# Patient Record
Sex: MALE | Race: WHITE | URBAN - METROPOLITAN AREA
[De-identification: names, ages, dates, MRNs, and addresses within clinical notes are randomized per-mention and may not be internally consistent; named-entity substitution may affect disease eponyms.]

---

## 2023-10-15 ENCOUNTER — HOSPITAL ENCOUNTER (INPATIENT)
Facility: HOSPITAL | Age: 43
LOS: 2 days | Discharge: HOME | DRG: 322 | End: 2023-10-17
Attending: EMERGENCY MEDICINE | Admitting: INTERNAL MEDICINE

## 2023-10-15 ENCOUNTER — APPOINTMENT (OUTPATIENT)
Dept: RADIOLOGY | Facility: HOSPITAL | Age: 43
DRG: 322 | End: 2023-10-15

## 2023-10-15 DIAGNOSIS — I21.19: ICD-10-CM

## 2023-10-15 DIAGNOSIS — I21.3 STEMI (ST ELEVATION MYOCARDIAL INFARCTION) (MULTI): Primary | ICD-10-CM

## 2023-10-15 DIAGNOSIS — I21.11 ST ELEVATION (STEMI) MYOCARDIAL INFARCTION INVOLVING RIGHT CORONARY ARTERY (MULTI): ICD-10-CM

## 2023-10-15 LAB
ALBUMIN SERPL BCP-MCNC: 4.1 G/DL (ref 3.4–5)
ALP SERPL-CCNC: 70 U/L (ref 33–120)
ALT SERPL W P-5'-P-CCNC: 18 U/L (ref 10–52)
ANION GAP SERPL CALC-SCNC: 14 MMOL/L (ref 10–20)
APTT PPP: 57 SECONDS (ref 27–38)
AST SERPL W P-5'-P-CCNC: 20 U/L (ref 9–39)
BASOPHILS # BLD AUTO: 0.06 X10*3/UL (ref 0–0.1)
BASOPHILS NFR BLD AUTO: 0.5 %
BILIRUB SERPL-MCNC: 0.4 MG/DL (ref 0–1.2)
BUN SERPL-MCNC: 12 MG/DL (ref 6–23)
CALCIUM SERPL-MCNC: 8.7 MG/DL (ref 8.6–10.3)
CARDIAC TROPONIN I PNL SERPL HS: 552 NG/L (ref 0–20)
CHLORIDE SERPL-SCNC: 100 MMOL/L (ref 98–107)
CO2 SERPL-SCNC: 25 MMOL/L (ref 21–32)
CREAT SERPL-MCNC: 0.92 MG/DL (ref 0.5–1.3)
EOSINOPHIL # BLD AUTO: 0.27 X10*3/UL (ref 0–0.7)
EOSINOPHIL NFR BLD AUTO: 2.2 %
ERYTHROCYTE [DISTWIDTH] IN BLOOD BY AUTOMATED COUNT: 12.2 % (ref 11.5–14.5)
GFR SERPL CREATININE-BSD FRML MDRD: >90 ML/MIN/1.73M*2
GLUCOSE SERPL-MCNC: 208 MG/DL (ref 74–99)
HCT VFR BLD AUTO: 44.8 % (ref 41–52)
HGB BLD-MCNC: 16.5 G/DL (ref 13.5–17.5)
IMM GRANULOCYTES # BLD AUTO: 0.06 X10*3/UL (ref 0–0.7)
IMM GRANULOCYTES NFR BLD AUTO: 0.5 % (ref 0–0.9)
INR PPP: 1.1 (ref 0.9–1.1)
LYMPHOCYTES # BLD AUTO: 3.77 X10*3/UL (ref 1.2–4.8)
LYMPHOCYTES NFR BLD AUTO: 30.8 %
MCH RBC QN AUTO: 31.5 PG (ref 26–34)
MCHC RBC AUTO-ENTMCNC: 36.8 G/DL (ref 32–36)
MCV RBC AUTO: 86 FL (ref 80–100)
MONOCYTES # BLD AUTO: 0.97 X10*3/UL (ref 0.1–1)
MONOCYTES NFR BLD AUTO: 7.9 %
NEUTROPHILS # BLD AUTO: 7.13 X10*3/UL (ref 1.2–7.7)
NEUTROPHILS NFR BLD AUTO: 58.1 %
NRBC BLD-RTO: 0 /100 WBCS (ref 0–0)
PLATELET # BLD AUTO: 237 X10*3/UL (ref 150–450)
PMV BLD AUTO: 10.2 FL (ref 7.5–11.5)
POTASSIUM SERPL-SCNC: 3.5 MMOL/L (ref 3.5–5.3)
PROT SERPL-MCNC: 6.7 G/DL (ref 6.4–8.2)
PROTHROMBIN TIME: 12.2 SECONDS (ref 9.8–12.8)
RBC # BLD AUTO: 5.24 X10*6/UL (ref 4.5–5.9)
SODIUM SERPL-SCNC: 135 MMOL/L (ref 136–145)
WBC # BLD AUTO: 12.3 X10*3/UL (ref 4.4–11.3)

## 2023-10-15 PROCEDURE — 99285 EMERGENCY DEPT VISIT HI MDM: CPT | Performed by: EMERGENCY MEDICINE

## 2023-10-15 PROCEDURE — 80053 COMPREHEN METABOLIC PANEL: CPT | Performed by: EMERGENCY MEDICINE

## 2023-10-15 PROCEDURE — 71045 X-RAY EXAM CHEST 1 VIEW: CPT | Performed by: STUDENT IN AN ORGANIZED HEALTH CARE EDUCATION/TRAINING PROGRAM

## 2023-10-15 PROCEDURE — 2550000001 HC RX 255 CONTRASTS

## 2023-10-15 PROCEDURE — 027034Z DILATION OF CORONARY ARTERY, ONE ARTERY WITH DRUG-ELUTING INTRALUMINAL DEVICE, PERCUTANEOUS APPROACH: ICD-10-PCS | Performed by: INTERNAL MEDICINE

## 2023-10-15 PROCEDURE — 1200000002 HC GENERAL ROOM WITH TELEMETRY DAILY

## 2023-10-15 PROCEDURE — 4A023N7 MEASUREMENT OF CARDIAC SAMPLING AND PRESSURE, LEFT HEART, PERCUTANEOUS APPROACH: ICD-10-PCS | Performed by: INTERNAL MEDICINE

## 2023-10-15 PROCEDURE — 99153 MOD SED SAME PHYS/QHP EA: CPT | Mod: AHUEP | Performed by: INTERNAL MEDICINE

## 2023-10-15 PROCEDURE — 96373 THER/PROPH/DIAG INJ IA: CPT

## 2023-10-15 PROCEDURE — 93458 L HRT ARTERY/VENTRICLE ANGIO: CPT | Performed by: INTERNAL MEDICINE

## 2023-10-15 PROCEDURE — 85347 COAGULATION TIME ACTIVATED: CPT

## 2023-10-15 PROCEDURE — C1887 CATHETER, GUIDING: HCPCS | Mod: AHUEP | Performed by: INTERNAL MEDICINE

## 2023-10-15 PROCEDURE — 84484 ASSAY OF TROPONIN QUANT: CPT | Performed by: EMERGENCY MEDICINE

## 2023-10-15 PROCEDURE — 92941 PRQ TRLML REVSC TOT OCCL AMI: CPT | Performed by: INTERNAL MEDICINE

## 2023-10-15 PROCEDURE — 99152 MOD SED SAME PHYS/QHP 5/>YRS: CPT | Mod: AHUEP | Performed by: INTERNAL MEDICINE

## 2023-10-15 PROCEDURE — 99153 MOD SED SAME PHYS/QHP EA: CPT | Performed by: INTERNAL MEDICINE

## 2023-10-15 PROCEDURE — 96372 THER/PROPH/DIAG INJ SC/IM: CPT

## 2023-10-15 PROCEDURE — 2500000004 HC RX 250 GENERAL PHARMACY W/ HCPCS (ALT 636 FOR OP/ED)

## 2023-10-15 PROCEDURE — 36415 COLL VENOUS BLD VENIPUNCTURE: CPT | Performed by: EMERGENCY MEDICINE

## 2023-10-15 PROCEDURE — 71045 X-RAY EXAM CHEST 1 VIEW: CPT | Mod: FY

## 2023-10-15 PROCEDURE — C1725 CATH, TRANSLUMIN NON-LASER: HCPCS | Mod: AHUEP | Performed by: INTERNAL MEDICINE

## 2023-10-15 PROCEDURE — B2111ZZ FLUOROSCOPY OF MULTIPLE CORONARY ARTERIES USING LOW OSMOLAR CONTRAST: ICD-10-PCS | Performed by: INTERNAL MEDICINE

## 2023-10-15 PROCEDURE — 85730 THROMBOPLASTIN TIME PARTIAL: CPT | Performed by: EMERGENCY MEDICINE

## 2023-10-15 PROCEDURE — C1769 GUIDE WIRE: HCPCS | Mod: AHUEP | Performed by: INTERNAL MEDICINE

## 2023-10-15 PROCEDURE — 99152 MOD SED SAME PHYS/QHP 5/>YRS: CPT | Performed by: INTERNAL MEDICINE

## 2023-10-15 PROCEDURE — 2500000005 HC RX 250 GENERAL PHARMACY W/O HCPCS

## 2023-10-15 PROCEDURE — 84075 ASSAY ALKALINE PHOSPHATASE: CPT | Performed by: EMERGENCY MEDICINE

## 2023-10-15 PROCEDURE — C1894 INTRO/SHEATH, NON-LASER: HCPCS | Mod: AHUEP | Performed by: INTERNAL MEDICINE

## 2023-10-15 PROCEDURE — 85610 PROTHROMBIN TIME: CPT | Performed by: EMERGENCY MEDICINE

## 2023-10-15 PROCEDURE — C1874 STENT, COATED/COV W/DEL SYS: HCPCS | Mod: AHUEP | Performed by: INTERNAL MEDICINE

## 2023-10-15 PROCEDURE — 92941 PRQ TRLML REVSC TOT OCCL AMI: CPT | Mod: AHUEP | Performed by: INTERNAL MEDICINE

## 2023-10-15 PROCEDURE — 2500000001 HC RX 250 WO HCPCS SELF ADMINISTERED DRUGS (ALT 637 FOR MEDICARE OP): Performed by: INTERNAL MEDICINE

## 2023-10-15 PROCEDURE — 85025 COMPLETE CBC W/AUTO DIFF WBC: CPT | Performed by: EMERGENCY MEDICINE

## 2023-10-15 PROCEDURE — 2780000003 HC OR 278 NO HCPCS: Mod: AHUEP | Performed by: INTERNAL MEDICINE

## 2023-10-15 PROCEDURE — 2720000007 HC OR 272 NO HCPCS: Mod: AHUEP | Performed by: INTERNAL MEDICINE

## 2023-10-15 PROCEDURE — 2500000004 HC RX 250 GENERAL PHARMACY W/ HCPCS (ALT 636 FOR OP/ED): Performed by: EMERGENCY MEDICINE

## 2023-10-15 DEVICE — STENT ONYXNG30022UX ONYX 3.00X22RX
Type: IMPLANTABLE DEVICE | Status: FUNCTIONAL
Brand: ONYX FRONTIER™

## 2023-10-15 RX ORDER — NAPROXEN SODIUM 220 MG/1
81 TABLET, FILM COATED ORAL DAILY
Status: DISCONTINUED | OUTPATIENT
Start: 2023-10-15 | End: 2023-10-17 | Stop reason: HOSPADM

## 2023-10-15 RX ORDER — LISINOPRIL 5 MG/1
5 TABLET ORAL DAILY
Status: DISCONTINUED | OUTPATIENT
Start: 2023-10-15 | End: 2023-10-17 | Stop reason: HOSPADM

## 2023-10-15 RX ORDER — SODIUM CHLORIDE 9 MG/ML
INJECTION, SOLUTION INTRAVENOUS CONTINUOUS PRN
Status: COMPLETED | OUTPATIENT
Start: 2023-10-15 | End: 2023-10-15

## 2023-10-15 RX ORDER — HEPARIN SODIUM 5000 [USP'U]/ML
INJECTION, SOLUTION INTRAVENOUS; SUBCUTANEOUS CODE/TRAUMA/SEDATION MEDICATION
Status: COMPLETED | OUTPATIENT
Start: 2023-10-15 | End: 2023-10-15

## 2023-10-15 RX ORDER — HEPARIN SODIUM 1000 [USP'U]/ML
INJECTION, SOLUTION INTRAVENOUS; SUBCUTANEOUS AS NEEDED
Status: DISCONTINUED | OUTPATIENT
Start: 2023-10-15 | End: 2023-10-15 | Stop reason: HOSPADM

## 2023-10-15 RX ORDER — ATORVASTATIN CALCIUM 80 MG/1
80 TABLET, FILM COATED ORAL NIGHTLY
Status: DISCONTINUED | OUTPATIENT
Start: 2023-10-15 | End: 2023-10-17 | Stop reason: HOSPADM

## 2023-10-15 RX ORDER — FENTANYL CITRATE 50 UG/ML
INJECTION, SOLUTION INTRAMUSCULAR; INTRAVENOUS AS NEEDED
Status: DISCONTINUED | OUTPATIENT
Start: 2023-10-15 | End: 2023-10-15 | Stop reason: HOSPADM

## 2023-10-15 RX ORDER — NITROGLYCERIN 0.4 MG/1
0.4 TABLET SUBLINGUAL EVERY 5 MIN PRN
Status: DISCONTINUED | OUTPATIENT
Start: 2023-10-15 | End: 2023-10-17 | Stop reason: HOSPADM

## 2023-10-15 RX ORDER — HEPARIN SODIUM 1000 [USP'U]/ML
4000 INJECTION, SOLUTION INTRAVENOUS; SUBCUTANEOUS ONCE
Status: DISCONTINUED | OUTPATIENT
Start: 2023-10-15 | End: 2023-10-15

## 2023-10-15 RX ORDER — LIDOCAINE HYDROCHLORIDE 20 MG/ML
INJECTION, SOLUTION INFILTRATION; PERINEURAL AS NEEDED
Status: DISCONTINUED | OUTPATIENT
Start: 2023-10-15 | End: 2023-10-15 | Stop reason: HOSPADM

## 2023-10-15 RX ORDER — NITROGLYCERIN 40 MG/100ML
INJECTION INTRAVENOUS CONTINUOUS PRN
Status: COMPLETED | OUTPATIENT
Start: 2023-10-15 | End: 2023-10-15

## 2023-10-15 RX ORDER — MIDAZOLAM HYDROCHLORIDE 1 MG/ML
INJECTION INTRAMUSCULAR; INTRAVENOUS AS NEEDED
Status: DISCONTINUED | OUTPATIENT
Start: 2023-10-15 | End: 2023-10-15 | Stop reason: HOSPADM

## 2023-10-15 RX ADMIN — TICAGRELOR 180 MG: 90 TABLET ORAL at 18:46

## 2023-10-15 RX ADMIN — LISINOPRIL 5 MG: 5 TABLET ORAL at 20:45

## 2023-10-15 RX ADMIN — HEPARIN SODIUM 4000 UNITS: 5000 INJECTION, SOLUTION INTRAVENOUS; SUBCUTANEOUS at 18:47

## 2023-10-15 RX ADMIN — ATORVASTATIN CALCIUM 80 MG: 80 TABLET ORAL at 21:00

## 2023-10-15 RX ADMIN — ASPIRIN 81 MG CHEWABLE TABLET 81 MG: 81 TABLET CHEWABLE at 20:45

## 2023-10-15 ASSESSMENT — ACTIVITIES OF DAILY LIVING (ADL)
JUDGMENT_ADEQUATE_SAFELY_COMPLETE_DAILY_ACTIVITIES: YES
PATIENT'S MEMORY ADEQUATE TO SAFELY COMPLETE DAILY ACTIVITIES?: YES
FEEDING YOURSELF: INDEPENDENT
HEARING - RIGHT EAR: FUNCTIONAL
BATHING: INDEPENDENT
GROOMING: INDEPENDENT
ADEQUATE_TO_COMPLETE_ADL: YES
TOILETING: INDEPENDENT
HEARING - LEFT EAR: FUNCTIONAL
WALKS IN HOME: INDEPENDENT
DRESSING YOURSELF: INDEPENDENT

## 2023-10-15 ASSESSMENT — COLUMBIA-SUICIDE SEVERITY RATING SCALE - C-SSRS
1. IN THE PAST MONTH, HAVE YOU WISHED YOU WERE DEAD OR WISHED YOU COULD GO TO SLEEP AND NOT WAKE UP?: NO
2. HAVE YOU ACTUALLY HAD ANY THOUGHTS OF KILLING YOURSELF?: NO
6. HAVE YOU EVER DONE ANYTHING, STARTED TO DO ANYTHING, OR PREPARED TO DO ANYTHING TO END YOUR LIFE?: NO

## 2023-10-15 ASSESSMENT — PAIN - FUNCTIONAL ASSESSMENT
PAIN_FUNCTIONAL_ASSESSMENT: 0-10
PAIN_FUNCTIONAL_ASSESSMENT: 0-10

## 2023-10-15 ASSESSMENT — PAIN DESCRIPTION - DESCRIPTORS: DESCRIPTORS: ACHING

## 2023-10-15 ASSESSMENT — PAIN SCALES - GENERAL
PAINLEVEL_OUTOF10: 1
PAINLEVEL_OUTOF10: 1
PAINLEVEL_OUTOF10: 2

## 2023-10-15 NOTE — ED PROVIDER NOTES
HPI   No chief complaint on file.      HPI: 43-year-old male arrives with chest pain across his anterior chest that started while driving.  EMS sent a twelve-lead to our emergency department and apparently spoke to Dr. Orozco they had told him they given aspirin but he asked him to hold on Brilinta and heparin.  The patient was having no pain radiating to the back and this was an obvious inferior wall MI with reciprocal changes in 1 and aVL ST segment elevations in 2 3 and aVF.  I spoke directly to the interventional list who came in with the team to take him to the Cath Lab.  In route to the hospital the interventionalists requested that we give him Brilinta and heparin immediately upon arrival which we did we also placed defibrillatory patches on him.  The squad also told us they gave nitro with this inferior wall event fortunately the patient did not suffer from any hypotension.  His pain seems to improve but his EKG is still demonstrating an acute inferior wall MI.      PMH: Patient has had blood clots as recently as 5 years ago but is not on any blood thinners.    SH live tobacco of alcohol  FH of heart disease and diabetes reports that almost everyone in his family has had a heart attack  ROS  General Appears in distress  HEENT: No sore throat, No Visual Loss, No Headache, No Ear Pain  Neck: Denies neck pain  Chest positive chest pain, no pleuritic pain, no chest wall injury  Pulmonary: No SOB, No Cough, No Sputum production, No Wheezing  GI: No abdominal pain, no nausea or vomiting, no diarrhea.  : No dysuria, no frequency, no hematuria.  Extremities: No musculoskeletal pain, normal ambulation, no paresthesia.  Psych: Normal interaction, no anxiety, no depression, no suicidal ideation  Skin: No rashes    ROS is otherwise negative    PE: General: Appears in distress        HEENT: Throat is moist without exudate, midline uvula dentate intact, Tms clear with normal anatomy.        Neck: Supple non tender         Chest CTA, good AE, no wheezing, rales, or rhonci        CVA: RRR S1S2 no S3S4 or murmur        ABD: W/S/NT no HSM, no pulsatile masses, good bowel sounds        Extremities: Excellent distal pulses, brisk capillary refill. Full ROM        Psych: Normal interactions with no signs of depression  or suicidal ideation.        Neuro: Alert and oriented, moves all and feels all.    MDM: Patient with acute inferior wall MI without any back pain patient had relief with nitro by EMS EMS also gave aspirin.  Patient has been prepped for the Cath Lab cardiology is already present awaiting his team patient will be admitted to cardiology intensive care unit.      History provided by:  Patient and EMS personnel  History limited by:  Acuity of condition   used: No                        [place] Coma Scale Score: 15                  Patient History   No past medical history on file.  No past surgical history on file.  No family history on file.  Social History     Tobacco Use    Smoking status: Not on file    Smokeless tobacco: Not on file   Substance Use Topics    Alcohol use: Not on file    Drug use: Not on file       Physical Exam   ED Triage Vitals [10/15/23 1844]   Temp Heart Rate Resp BP   -- (!) 112 18 (!) 164/96      SpO2 Temp src Heart Rate Source Patient Position   99 % -- -- --      BP Location FiO2 (%)     -- --       Physical Exam    ED Course & MDM   Diagnoses as of 10/15/23 1851   Acute MI, inferior wall (CMS/[facility])       Medical Decision Making      Procedure  Critical Care    Performed by: Charlie Spaulding MD  Authorized by: Charlie Spaulding MD    Critical care provider statement:     Critical care time (minutes): STEMI.    Critical care start time:  10/15/2023 6:40 PM    Critical care end time:  10/15/2023 7:12 PM    Critical care time was exclusive of:  Separately billable procedures and treating other patients and teaching time    Critical care was necessary to treat or prevent imminent or  life-threatening deterioration of the following conditions:  Cardiac failure and circulatory failure    Critical care was time spent personally by me on the following activities:  Discussions with consultants, examination of patient, pulse oximetry, ordering and review of radiographic studies, ordering and review of laboratory studies, ordering and performing treatments and interventions and re-evaluation of patient's condition    I assumed direction of critical care for this patient from another provider in my specialty: yes      Care discussed with: admitting provider         Charlie Spaulding MD  10/15/23 3658

## 2023-10-15 NOTE — POST-PROCEDURE NOTE
Physician Transition of Care Summary  Invasive Cardiovascular Lab    Procedure Date: 10/15/2023  Attending:    * Invasive Cardiologist Jeanne - Primary     * Juan Manuel Hall  Resident/Fellow/Other Assistant: C. Barton Gillombardo, MD    Pre Procedure Indications:   ACS less than or equal to 24 hours STEMI     Post Procedure Diagnosis:   RCA culprit  Moderate non-obstructive CAD otherwise    Procedure(s):   Left Heart Catheterization and Percutaneous Coronary Intervention    Procedure Findings:   See syngo for details, briefly:  LM: distal 40  LAD: prox-mid 50   RI: prox 40  LCX: prox 50  RCA: 100% mid, culprit    Description of the Procedure:   Successful, uncomplicated and well-tolerated by the patient    Complications:   None    Stents/Implants:   3.0 by 20 mm drug-eluting stent    Anticoagulation/Antiplatelet Plan:   Aspirin and Brilinta    Estimated Blood Loss:   10 mL    Anesthesia: Moderate Sedation Anesthesia Staff: No anesthesia staff entered.    Any Specimen(s) Removed:   No specimens collected during this procedure.    Disposition:   Admit to ICU for close monitoring, signout has been provided to Dr. Geo Vazquez the attending cardiologist on-call this evening.      Electronically signed by: Carl B Gillombardo, MD, 10/15/2023 7:54 PM

## 2023-10-15 NOTE — Clinical Note
Patient Clipped and Prepped: groin and right radial. Prepped with ChloraPrep, a minimum of 3 minute dry time, longer if needed, no pooling noted, patient draped in sterile fashion.

## 2023-10-15 NOTE — Clinical Note
Catheters and wires removed Detail Level: Detailed Add 70026 Cpt? (Important Note: In 2017 The Use Of 39308 Is Being Tracked By Cms To Determine Future Global Period Reimbursement For Global Periods): yes

## 2023-10-16 ENCOUNTER — APPOINTMENT (OUTPATIENT)
Dept: CARDIOLOGY | Facility: HOSPITAL | Age: 43
DRG: 322 | End: 2023-10-16

## 2023-10-16 LAB
ACT BLD: 242 SEC (ref 89–169)
ALBUMIN SERPL BCP-MCNC: 3.9 G/DL (ref 3.4–5)
ANION GAP SERPL CALC-SCNC: 10 MMOL/L (ref 10–20)
BNP SERPL-MCNC: 87 PG/ML (ref 0–99)
BODY SURFACE AREA: 2.44 M2
BUN SERPL-MCNC: 9 MG/DL (ref 6–23)
CALCIUM SERPL-MCNC: 8.6 MG/DL (ref 8.6–10.3)
CARDIAC TROPONIN I PNL SERPL HS: ABNORMAL NG/L (ref 0–20)
CHLORIDE SERPL-SCNC: 101 MMOL/L (ref 98–107)
CHOLEST SERPL-MCNC: 135 MG/DL (ref 0–199)
CHOLESTEROL/HDL RATIO: 5.9
CO2 SERPL-SCNC: 27 MMOL/L (ref 21–32)
CREAT SERPL-MCNC: 0.94 MG/DL (ref 0.5–1.3)
EJECTION FRACTION APICAL 4 CHAMBER: 66.4
ERYTHROCYTE [DISTWIDTH] IN BLOOD BY AUTOMATED COUNT: 12.2 % (ref 11.5–14.5)
EST. AVERAGE GLUCOSE BLD GHB EST-MCNC: 169 MG/DL
GFR SERPL CREATININE-BSD FRML MDRD: >90 ML/MIN/1.73M*2
GLUCOSE SERPL-MCNC: 176 MG/DL (ref 74–99)
HBA1C MFR BLD: 7.5 %
HCT VFR BLD AUTO: 42.4 % (ref 41–52)
HDLC SERPL-MCNC: 22.8 MG/DL
HGB BLD-MCNC: 15.3 G/DL (ref 13.5–17.5)
LDLC SERPL CALC-MCNC: 40 MG/DL
MCH RBC QN AUTO: 31.4 PG (ref 26–34)
MCHC RBC AUTO-ENTMCNC: 36.1 G/DL (ref 32–36)
MCV RBC AUTO: 87 FL (ref 80–100)
NON HDL CHOLESTEROL: 112 MG/DL (ref 0–149)
NRBC BLD-RTO: 0 /100 WBCS (ref 0–0)
PHOSPHATE SERPL-MCNC: 3 MG/DL (ref 2.5–4.9)
PLATELET # BLD AUTO: 225 X10*3/UL (ref 150–450)
PMV BLD AUTO: 9.9 FL (ref 7.5–11.5)
POTASSIUM SERPL-SCNC: 3.8 MMOL/L (ref 3.5–5.3)
RBC # BLD AUTO: 4.87 X10*6/UL (ref 4.5–5.9)
SODIUM SERPL-SCNC: 134 MMOL/L (ref 136–145)
TRIGL SERPL-MCNC: 362 MG/DL (ref 0–149)
VLDL: 72 MG/DL (ref 0–40)
WBC # BLD AUTO: 17.7 X10*3/UL (ref 4.4–11.3)

## 2023-10-16 PROCEDURE — 83036 HEMOGLOBIN GLYCOSYLATED A1C: CPT | Mod: AHULAB,CMCLAB | Performed by: INTERNAL MEDICINE

## 2023-10-16 PROCEDURE — 93306 TTE W/DOPPLER COMPLETE: CPT

## 2023-10-16 PROCEDURE — 1200000002 HC GENERAL ROOM WITH TELEMETRY DAILY

## 2023-10-16 PROCEDURE — 2500000001 HC RX 250 WO HCPCS SELF ADMINISTERED DRUGS (ALT 637 FOR MEDICARE OP): Performed by: STUDENT IN AN ORGANIZED HEALTH CARE EDUCATION/TRAINING PROGRAM

## 2023-10-16 PROCEDURE — 2500000004 HC RX 250 GENERAL PHARMACY W/ HCPCS (ALT 636 FOR OP/ED): Performed by: INTERNAL MEDICINE

## 2023-10-16 PROCEDURE — 2500000004 HC RX 250 GENERAL PHARMACY W/ HCPCS (ALT 636 FOR OP/ED): Performed by: STUDENT IN AN ORGANIZED HEALTH CARE EDUCATION/TRAINING PROGRAM

## 2023-10-16 PROCEDURE — 2500000001 HC RX 250 WO HCPCS SELF ADMINISTERED DRUGS (ALT 637 FOR MEDICARE OP): Performed by: INTERNAL MEDICINE

## 2023-10-16 PROCEDURE — 80069 RENAL FUNCTION PANEL: CPT | Performed by: INTERNAL MEDICINE

## 2023-10-16 PROCEDURE — 83880 ASSAY OF NATRIURETIC PEPTIDE: CPT | Performed by: INTERNAL MEDICINE

## 2023-10-16 PROCEDURE — 36415 COLL VENOUS BLD VENIPUNCTURE: CPT | Performed by: INTERNAL MEDICINE

## 2023-10-16 PROCEDURE — 99223 1ST HOSP IP/OBS HIGH 75: CPT | Performed by: INTERNAL MEDICINE

## 2023-10-16 PROCEDURE — 80061 LIPID PANEL: CPT | Performed by: INTERNAL MEDICINE

## 2023-10-16 PROCEDURE — 84484 ASSAY OF TROPONIN QUANT: CPT | Performed by: INTERNAL MEDICINE

## 2023-10-16 PROCEDURE — 85027 COMPLETE CBC AUTOMATED: CPT | Performed by: INTERNAL MEDICINE

## 2023-10-16 PROCEDURE — 93306 TTE W/DOPPLER COMPLETE: CPT | Performed by: INTERNAL MEDICINE

## 2023-10-16 RX ORDER — MORPHINE SULFATE 2 MG/ML
2 INJECTION, SOLUTION INTRAMUSCULAR; INTRAVENOUS EVERY 4 HOURS PRN
Status: DISCONTINUED | OUTPATIENT
Start: 2023-10-16 | End: 2023-10-17 | Stop reason: HOSPADM

## 2023-10-16 RX ORDER — METOPROLOL TARTRATE 25 MG/1
25 TABLET, FILM COATED ORAL 2 TIMES DAILY
Status: DISCONTINUED | OUTPATIENT
Start: 2023-10-16 | End: 2023-10-17 | Stop reason: HOSPADM

## 2023-10-16 RX ORDER — MORPHINE SULFATE 4 MG/ML
4 INJECTION, SOLUTION INTRAMUSCULAR; INTRAVENOUS EVERY 4 HOURS PRN
Status: DISCONTINUED | OUTPATIENT
Start: 2023-10-16 | End: 2023-10-17 | Stop reason: HOSPADM

## 2023-10-16 RX ADMIN — TICAGRELOR 90 MG: 90 TABLET ORAL at 21:00

## 2023-10-16 RX ADMIN — METOPROLOL TARTRATE 25 MG: 25 TABLET, FILM COATED ORAL at 20:46

## 2023-10-16 RX ADMIN — ASPIRIN 81 MG CHEWABLE TABLET 81 MG: 81 TABLET CHEWABLE at 09:39

## 2023-10-16 RX ADMIN — LISINOPRIL 5 MG: 5 TABLET ORAL at 09:39

## 2023-10-16 RX ADMIN — NITROGLYCERIN 0.4 MG: 0.4 TABLET SUBLINGUAL at 00:49

## 2023-10-16 RX ADMIN — NITROGLYCERIN 0.4 MG: 0.4 TABLET SUBLINGUAL at 00:54

## 2023-10-16 RX ADMIN — MORPHINE SULFATE 4 MG: 4 INJECTION, SOLUTION INTRAMUSCULAR; INTRAVENOUS at 01:21

## 2023-10-16 RX ADMIN — METOPROLOL TARTRATE 25 MG: 25 TABLET, FILM COATED ORAL at 09:39

## 2023-10-16 RX ADMIN — TICAGRELOR 90 MG: 90 TABLET ORAL at 09:39

## 2023-10-16 RX ADMIN — ATORVASTATIN CALCIUM 80 MG: 80 TABLET ORAL at 20:46

## 2023-10-16 RX ADMIN — PERFLUTREN 4 ML OF DILUTION: 6.52 INJECTION, SUSPENSION INTRAVENOUS at 13:47

## 2023-10-16 ASSESSMENT — PAIN SCALES - GENERAL
PAINLEVEL_OUTOF10: 0 - NO PAIN
PAINLEVEL_OUTOF10: 7
PAINLEVEL_OUTOF10: 3
PAINLEVEL_OUTOF10: 0 - NO PAIN
PAINLEVEL_OUTOF10: 0 - NO PAIN
PAINLEVEL_OUTOF10: 2
PAINLEVEL_OUTOF10: 0 - NO PAIN

## 2023-10-16 ASSESSMENT — ACTIVITIES OF DAILY LIVING (ADL): LACK_OF_TRANSPORTATION: NO

## 2023-10-16 ASSESSMENT — PAIN - FUNCTIONAL ASSESSMENT
PAIN_FUNCTIONAL_ASSESSMENT: 0-10

## 2023-10-16 ASSESSMENT — PAIN DESCRIPTION - DESCRIPTORS: DESCRIPTORS: SORE

## 2023-10-16 NOTE — CARE PLAN
The patient's goals for the shift include feel better    The clinical goals for the shift include pt will maintain adequate perfusion in R hand/wrist throughout this shift      Problem: Pain  Goal: Takes deep breaths with improved pain control throughout the shift  Outcome: Progressing  Goal: Turns in bed with improved pain control throughout the shift  Outcome: Progressing  Goal: Performs ADL's with improved pain control throughout shift  Outcome: Progressing     Problem: Pain  Goal: Free from opioid side effects throughout the shift  Outcome: Met  Goal: Free from acute confusion related to pain meds throughout the shift  Outcome: Met

## 2023-10-16 NOTE — H&P
History Of Present Illness:    Francisco May is a 43 y.o. male presenting with PMH of DVT/PE in 2019 (previously on warfarin) who presented overnight with chest pain, found to have inferior STEMI. He reports he has had progressive chest pain over the last few days, initially he thought related to indigestion, but continued to get worse. Sunday night, he experienced central, non-radiating chest pain while driving, was evaluated by EMS and aspirin loaded after speaking with  physician prior to arrival. Interventional cardiology was contacted, advising ED providers to start heparin gtt and load with brilinta, he was taken to the cath lab where he was found to have 100% thrombotic occlusion of the RCA which was stented without issue.     On interview today, he reports he is doing much better and has no acute cardiopulmonary complaints. His radial access site without signs of bleeing, bruising, or hematoma. He reports he feels well enough to go home. Regarding his prior PE, he reports the discussion at that time was for provoked PE either due to prolonged immobilization (works as a ) or possibly from prior injury (reports injured knee which was never really worked up). He has no known family history of coagulopathy, but reports significant cardiovascular disease in his family stating essentially all of his family members have had heart attacks (see below). He reports he has been a smoker since about age 35 and smokes 1 ppd. He was not on any medications prior to this admission.     Labs/Imaging:  CBC: 17.7/15.3/225  INR: 1.1  RFP: Na 134, K 3.8, Cr 0.94 (0.92), otherwise wnl  LFTs: wnl  Hs-troponin: 552 > 34,345  BNP: 87  Lipid panel: total 135, LDL 40, VLDL 72, HDL 22.8,     CXR: No evidence of acute cardipulmonary process. Low lung volumes/poor inspiratory effort.     Cardiac Hx:  TTE pending    ECG 10/15/23: NSR, normal axis, normal intervals, ST elevations II, III, aVF, ST depression V2  ECG  10/16/23: NSR, normal axis, normal intervals, significant interval improvement in prior ST elevations/depressions    XA 10/15/23:   LM: distal 40  LAD: prox-mid 50   RI: prox 40  LCX: prox 50  RCA: 100% mid, culprit    PMH: DVT/PE in 2019 (6 mos warfarin after)    PSH: none    FHx:  Significant family history of T2DM and CVD, many family members with hx of MI  He reports father had 9 heart attacks and 7 strokes,  at 48 from heart attack  Mother with diabetes, had MI at 48    SHx: ~7 pack-year smoking history; denies drug or alcohol use; reports 60 lb weight loss in the last year just from reduced calorie intake    Allergies:   Amoxicillin - reports rash as child after receiving ear tubes    Medications: no home medications     Last Recorded Vitals:  Vitals:    10/16/23 0400 10/16/23 0500 10/16/23 0600 10/16/23 0805   BP: 115/64 110/64 98/56 113/69   BP Location: Left arm      Patient Position: Lying      Pulse: 83 78 72 84   Resp: (!) 32 23 23 22   Temp: 36.1 °C (97 °F)   36.2 °C (97.2 °F)   TempSrc: Temporal      SpO2: 98% 95% 96%    Weight:       Height:           Last Labs:  CBC - 10/16/2023:  3:59 AM  17.7 15.3 225    42.4      CMP - 10/16/2023:  3:59 AM  8.6 6.7 20 --- 0.4   3.0 3.9 18 70      PTT - 10/15/2023:  6:56 PM  1.1   12.2 57     Troponin I, High Sensitivity   Date/Time Value Ref Range Status   10/16/2023 03:59 AM 34,345 (HH) 0 - 20 ng/L Final     Comment:     Previous result verified on 10/15/2023 1938 on specimen/case [id number] [facility] Troponin I, High Sensitivity with value 552 ng/L.   10/15/2023 06:56  (HH) 0 - 20 ng/L Final     BNP   Date/Time Value Ref Range Status   10/16/2023 03:59 AM 87 0 - 99 pg/mL Final     LDL Calculated   Date/Time Value Ref Range Status   10/16/2023 05:34 AM 40 <=99 mg/dL Final     Comment:                                 Near   Borderline      AGE      Desirable  Optimal    High     High     Very High     0-19 Y     0 -  "109     ---    110-129   >/= 130     ----    20-24 Y     0 - 119     ---    120-159   >/= 160     ----      >24 Y     0 -  99   100-129  130-159   160-189     >/=190       VLDL   Date/Time Value Ref Range Status   10/16/2023 05:34 AM 72 (H) 0 - 40 mg/dL Final      Last I/O:  I/O last 3 completed shifts:  In: - (0 mL/kg)   Out: 960 (8.2 mL/kg) [Urine:950 (0.2 mL/kg/hr); Blood:10]  Weight: 117 kg     Past Cardiology Tests (Last 3 Years):  EKG:  No results found for this or any previous visit from the past 1095 days.    Echo:  No results found for this or any previous visit from the past 1095 days.    Ejection Fractions:  No results found for: \"EF\"  Cath:  No results found for this or any previous visit from the past 1095 days.    Stress Test:  No results found for this or any previous visit from the past 1095 days.    Cardiac Imaging:  No results found for this or any previous visit from the past 1095 days.    Past Medical History:  He has no past medical history on file.    Past Surgical History:  He has no past surgical history on file.      Social History:  He reports that he has been smoking cigarettes. He has a 12.00 pack-year smoking history. He does not have any smokeless tobacco history on file. He reports that he does not currently use alcohol. He reports that he does not use drugs.    Family History:  Family History   Problem Relation Name Age of Onset    Heart attack Mother      Heart attack Father          Allergies:  Amoxicillin    Inpatient Medications:  Scheduled medications   Medication Dose Route Frequency    aspirin  81 mg oral Daily    atorvastatin  80 mg oral Nightly    lisinopril  5 mg oral Daily    metoprolol tartrate  25 mg oral BID    ticagrelor  180 mg oral Once    ticagrelor  90 mg oral BID     PRN medications   Medication    morphine    morphine    nitroglycerin     Continuous Medications   Medication Dose Last Rate     Outpatient Medications:  No current outpatient medications    Physical " Exam:  Constitutional: NAD, pleasant, obese  HEENT: PERRLA, EOMI, no scleral icterus, MMM  CV: RRR, no m/r/g, no JVD  Pulm: CTAB, no increased WOB  GI: Soft, NT, ND, normoactive BS  Extremities: no LE edema  Skin: WWP  Neuro: A&Ox4, no FND  Psych: Mood and affect appropriate to situation     Assessment/Plan   Francisco May is a 43 y.o. male presenting with PMH of DVT/PE in 2019 (previously on warfarin)  who presented overnight with chest pain, found to have inferior STEMI overnight 10/15-10/16. Had revascularization to RCA, started on DAPT with aspirin/brilinta, statin, and beta blocker. Echo pending.     #CAD c/b RCA STEMI 10/15 s/p PCI x1  #DLD  -Several days of heartburn-like pain then chest pain while driving, found to have inferior STEMI, brought in by EMS, successful PCI to RCA; Hs-troponin: 552>34,345; BNP: 87  -Significant family history of CVD, including father who  from heart attack at 48  -XA 10/15/23: LM: distal 40%; LAD: prox-mid 50%; RI: prox 40%; LCx: prox 50%; RCA: 100% mid-culprit s/p PCI  -Lipid panel: total 135, LDL 40, VLDL 72, HDL 22.8,   -Lipoprotein A level ordered for AM  -HbA1c pending, oral diabetic regimen can be started as appropriate  -Loaded with aspirin/brilinta, continue aspirin 81 mg daily and brilinta 90 mg BID  -Continue atorvastatin 80 mg daily   -Start metoprolol tartrate 25 mg BID; transition to succinate before discharge  -Follow-up complete echo  -Discussed smoking cessation, patient is motivated to quit at this time    #Hx of DVT/PE  -Previously treated for DVT/PE in 2019, likely provoked as he works as a   -Was on coumadin in the past  -Refer to vascular medicine outpatient on discharge to consider coagulopathy workup, he reports significant cardiovascular history in his family, includes father with multiple strokes    F: PRN  E: K>4, Mg>2  N: cardiac diet  A: PIVs  GI ppx: none  DVT ppx: ambulatory    Code status: Full code    I spent 45 minutes in the  professional and overall care of this patient. Patient staffed with cardiology attending, Dr. Baker. Cardiology will continue to follow.     Shawn Campos MD

## 2023-10-16 NOTE — PROGRESS NOTES
Pharmacy Medication History Review    Francisco May is a 43 y.o. male admitted for Acute MI, inferior wall (CMS/[facility]). Pharmacy reviewed the patient's [id number] medications and allergies for accuracy.    The list below reflectives the updated PTA list. Please review each medication in order reconciliation for additional clarification and justification.  No medications prior to admission.        The list below reflectives the updated allergy list. Please review each documented allergy for additional clarification and justification.  Allergies  Reviewed by Ching Presley RN on 10/15/2023        Severity Reactions Comments    Amoxicillin Low Rash Per pt report; states last reaction was at 5 y.o            Below are additional concerns with the patient's PTA list.  Per patient no medications or OTC    Sylvia Camarena CPhT

## 2023-10-16 NOTE — PROGRESS NOTES
10/16/23 1700   Discharge Planning   Living Arrangements Spouse/significant other   Support Systems Spouse/significant other   Assistance Needed Independent,drives, works   Type of Residence Private residence   Number of Stairs to Enter Residence 1   Number of Stairs Within Residence 12   Do you have animals or pets at home? Yes   Type of Animals or Pets Fish   Who is requesting discharge planning? Other (Comment)   Home or Post Acute Services None   Patient expects to be discharged to: [facility]   Does the patient need discharge transport arranged? No   Financial Resource Strain   How hard is it for you to pay for the very basics like food, housing, medical care, and heating? Not hard   Housing Stability   In the last 12 months, was there a time when you were not able to pay the mortgage or rent on time? N   In the last 12 months, how many places have you lived? 1   In the last 12 months, was there a time when you did not have a steady place to sleep or slept in a shelter (including now)? N   Transportation Needs   In the past 12 months, has lack of transportation kept you from medical appointments or from getting medications? no   In the past 12 months, has lack of transportation kept you from meetings, work, or from getting things needed for daily living? No   Patient Choice   Provider Choice list and CMS website (https://medicare.gov/care-compare#search) for post-acute Quality and Resource Measure Data were provided and reviewed with: Patient   Patient / Family choosing to utilize agency / facility established prior to hospitalization No       Met with patient at bedside and explained my role as care coordinator. He stated he lives with his wife in the house. He is independent with his care. He drives and works. Denies use of any ambulatory devices. No oxygen in use at home, no HD. Patient stated doesn't have PCP. Offered to giver him list of providers, he stated will find his own. Pharmacy he would use is  [facility] in [place]. Patient had cardiac catheterization done and one stent placed. Patient denies any needs going home.

## 2023-10-17 ENCOUNTER — PHARMACY VISIT (OUTPATIENT)
Dept: PHARMACY | Facility: CLINIC | Age: 43
End: 2023-10-17
Payer: COMMERCIAL

## 2023-10-17 VITALS
BODY MASS INDEX: 41.45 KG/M2 | OXYGEN SATURATION: 97 % | HEIGHT: 66 IN | WEIGHT: 257.94 LBS | RESPIRATION RATE: 16 BRPM | DIASTOLIC BLOOD PRESSURE: 67 MMHG | TEMPERATURE: 98.3 F | HEART RATE: 81 BPM | SYSTOLIC BLOOD PRESSURE: 104 MMHG

## 2023-10-17 PROCEDURE — RXMED WILLOW AMBULATORY MEDICATION CHARGE

## 2023-10-17 PROCEDURE — 2500000004 HC RX 250 GENERAL PHARMACY W/ HCPCS (ALT 636 FOR OP/ED): Performed by: STUDENT IN AN ORGANIZED HEALTH CARE EDUCATION/TRAINING PROGRAM

## 2023-10-17 PROCEDURE — 2500000001 HC RX 250 WO HCPCS SELF ADMINISTERED DRUGS (ALT 637 FOR MEDICARE OP)

## 2023-10-17 PROCEDURE — 99239 HOSP IP/OBS DSCHRG MGMT >30: CPT

## 2023-10-17 PROCEDURE — 2500000001 HC RX 250 WO HCPCS SELF ADMINISTERED DRUGS (ALT 637 FOR MEDICARE OP): Performed by: INTERNAL MEDICINE

## 2023-10-17 PROCEDURE — 2500000001 HC RX 250 WO HCPCS SELF ADMINISTERED DRUGS (ALT 637 FOR MEDICARE OP): Performed by: STUDENT IN AN ORGANIZED HEALTH CARE EDUCATION/TRAINING PROGRAM

## 2023-10-17 RX ORDER — NAPROXEN SODIUM 220 MG/1
81 TABLET, FILM COATED ORAL DAILY
Qty: 30 TABLET | Refills: 1 | Status: SHIPPED | OUTPATIENT
Start: 2023-10-17 | End: 2023-11-14 | Stop reason: SDUPTHER

## 2023-10-17 RX ORDER — ATORVASTATIN CALCIUM 80 MG/1
80 TABLET, FILM COATED ORAL NIGHTLY
Qty: 30 TABLET | Refills: 1 | Status: SHIPPED | OUTPATIENT
Start: 2023-10-17 | End: 2023-11-14 | Stop reason: SDUPTHER

## 2023-10-17 RX ORDER — METOPROLOL TARTRATE 25 MG/1
25 TABLET, FILM COATED ORAL 2 TIMES DAILY
Qty: 30 TABLET | Refills: 1 | Status: SHIPPED | OUTPATIENT
Start: 2023-10-17 | End: 2023-11-14 | Stop reason: SDUPTHER

## 2023-10-17 RX ADMIN — TICAGRELOR 90 MG: 90 TABLET ORAL at 08:27

## 2023-10-17 RX ADMIN — LISINOPRIL 5 MG: 5 TABLET ORAL at 08:27

## 2023-10-17 RX ADMIN — ASPIRIN 81 MG CHEWABLE TABLET 81 MG: 81 TABLET CHEWABLE at 08:27

## 2023-10-17 RX ADMIN — METOPROLOL TARTRATE 25 MG: 25 TABLET, FILM COATED ORAL at 08:27

## 2023-10-17 ASSESSMENT — PAIN SCALES - GENERAL
PAINLEVEL_OUTOF10: 0 - NO PAIN

## 2023-10-17 ASSESSMENT — PAIN - FUNCTIONAL ASSESSMENT
PAIN_FUNCTIONAL_ASSESSMENT: 0-10

## 2023-10-17 NOTE — DISCHARGE SUMMARY
Subjective Data:  Evaluated patient today, he reports that he feels great, no symptoms of chest pain, dyspnea, nausea, vomiting, diaphoresis, dizziness or lightheadedness.      Overnight Events:    No events overnight       Objective Data:  Last Recorded Vitals:  Vitals:    10/16/23 2000 10/17/23 0000 10/17/23 0400 10/17/23 0825   BP: 113/74  97/54 110/75   BP Location: Left arm      Patient Position: Sitting   Lying   Pulse: 73 86 87 82   Resp: 20 20 16   Temp: 36.2 °C (97.2 °F) 36.2 °C (97.1 °F) 36.2 °C (97.1 °F) 36.7 °C (98 °F)   TempSrc: Temporal  Temporal Temporal   SpO2: 97% 97% 97% 97%   Weight:       Height:           Last Labs:  CBC - 10/16/2023:  3:59 AM  17.7 15.3 225    42.4      CMP - 10/16/2023:  3:59 AM  8.6 6.7 20 --- 0.4   3.0 3.9 18 70      PTT - 10/15/2023:  6:56 PM  1.1   12.2 57     TROPHS   Date/Time Value Ref Range Status   10/16/2023 03:59 AM 34,345 0 - 20 ng/L Final     Comment:     Previous result verified on 10/15/2023 1938 on specimen/case [id number] called with component [facility] for procedure Troponin I, High Sensitivity with value 552 ng/L.   10/15/2023 06:56  0 - 20 ng/L Final     BNP   Date/Time Value Ref Range Status   10/16/2023 03:59 AM 87 0 - 99 pg/mL Final     HGBA1C   Date/Time Value Ref Range Status   10/16/2023 03:59 AM 7.5 see below % Final     LDLCALC   Date/Time Value Ref Range Status   10/16/2023 05:34 AM 40 <=99 mg/dL Final     Comment:                                 Near   Borderline      AGE      Desirable  Optimal    High     High     Very High     0-19 Y     0 - 109     ---    110-129   >/= 130     ----    20-24 Y     0 - 119     ---    120-159   >/= 160     ----      >24 Y     0 -  99   100-129  130-159   160-189     >/=190       VLDL   Date/Time Value Ref Range Status   10/16/2023 05:34 AM 72 0 - 40 mg/dL Final      Last I/O:  I/O last 3 completed shifts:  In: 620 (5.3 mL/kg) [P.O.:620]  Out: 1260 (10.8 mL/kg) [Urine:1250 (0.3 mL/kg/hr);  Blood:10]  Weight: 117 kg     Cardiology Tests  Echo:  Transthoracic Echo (TTE) Complete 10/16/2023  CONCLUSIONS:   1. Left ventricular systolic function is low normal with a 50-55% estimated ejection fraction.   2. Basal and mid inferior wall is abnormal.   3. Poorly visualized anatomical structures due to suboptimal image quality.      Inpatient Medications:  Scheduled medications   Medication Dose Route Frequency    aspirin  81 mg oral Daily    atorvastatin  80 mg oral Nightly    lisinopril  5 mg oral Daily    metoprolol tartrate  25 mg oral BID    ticagrelor  180 mg oral Once    ticagrelor  90 mg oral BID     PRN medications   Medication    morphine    morphine    nitroglycerin     Continuous Medications   Medication Dose Last Rate       Physical Exam:  Alert and oriented x3, no apparent distress  Skin: Warm and dry, right wrist cath site intact, no bleeding, hematoma or ecchymosis. Radial pulse intact. No pain at site.  No JVD noted  Lungs clear, no supplemental oxygen requirements  Cardiac: Regular rate and rhythm, no cardiac murmurs noted  Abdomen soft non-tender  No lower extremity edema  Mood appropriate         Assessment/Plan   Francisco May is a 43 y.o. male presenting with PMH of DVT/PE in 2019 (previously on warfarin)  who presented with CP, found to be having an inferior STEMI 10/15-10/16/23. Had revascularization to RCA, started on DAPT with aspirin/brilinta, statin, and beta blocker. Chest pain free.    Patient had several days of heartburn-like pain then chest pain while driving, found to have inferior STEMI, brought in by EMS, successful PCI to RCA 10/15/23; Hs-troponin: 552>34,345; BNP: 87  -XA 10/15/23: LM: distal 40%; LAD: prox-mid 50%; RI: prox 40%; LCx: prox 50%; RCA: 100% mid-culprit s/p PCI  -Lipid panel: total 135, LDL 40, VLDL 72, HDL 22.8,   -Lipoprotein A level ordered  -HbA1c 7.5  -Loaded with aspirin/brilinta, continue aspirin 81 mg daily and brilinta 90 mg BID  -Continue  atorvastatin 80 mg daily   -Start metoprolol tartrate 25 mg BID;  - Hold Ace/ Arb d/t soft Bps  -Discussed smoking cessation, patient is motivated to quit at this time  #Hx of DVT/PE  -Previously treated for DVT/PE in 2019, likely provoked as he works as a   -Was on coumadin in the past  -Refer to vascular medicine outpatient on discharge to consider coagulopathy workup, he reports significant cardiovascular history in his family, includes father with multiple strokes    EKG today reveals normal sinus rhythm, patient continues to be chest pain-free.  Echocardiogram reveals EF 50 to 55%.  Patient will follow with cardiologist Dr. Tonio Baker post discharge.  Patient will be discharged in stable condition.     Spent greater than 60 min on discharge prep     Code status: Full code        Code Status:  No Order          JIMI Vaughn-CNP

## 2023-10-17 NOTE — ED NOTES
CHW was informed by  that patient needs paperwork filled out ,through [employer], to help with medical bills and insurance. Patient filled out paperwork and CHW sent back to [facility] for approval. Patient expressed appreciation     Joanne Jeffery [facility]  10/17/23 [id number]

## 2023-10-17 NOTE — PROGRESS NOTES
Francisco May is a 43 y.o. male on day 2 of admission presenting with Acute MI, inferior wall (CMS/HCC).      Patient is stable. He has active discharge orders. Patient denies any needs going home.

## 2023-10-17 NOTE — CARE PLAN
The patient's goals for the shift include feel better    The clinical goals for the shift include Pt will remain hemodynamically stable throughout this shift    Over the shift, the patient met goals and will be discharged home.

## 2023-10-17 NOTE — PROGRESS NOTES
Patient does not qualify for medicaid, will discuss alternative insurance options with patient.

## 2023-10-23 RX ORDER — OMEPRAZOLE 20 MG/1
1 CAPSULE, DELAYED RELEASE ORAL
COMMUNITY
Start: 2016-05-05

## 2023-10-25 ENCOUNTER — OFFICE VISIT (OUTPATIENT)
Dept: CARDIOLOGY | Facility: CLINIC | Age: 43
End: 2023-10-25

## 2023-10-25 VITALS
OXYGEN SATURATION: 95 % | WEIGHT: 257.6 LBS | HEART RATE: 83 BPM | SYSTOLIC BLOOD PRESSURE: 128 MMHG | DIASTOLIC BLOOD PRESSURE: 84 MMHG | BODY MASS INDEX: 41.58 KG/M2

## 2023-10-25 DIAGNOSIS — E11.9 DIABETES MELLITUS TYPE II, NON INSULIN DEPENDENT (MULTI): ICD-10-CM

## 2023-10-25 DIAGNOSIS — I21.19: Primary | ICD-10-CM

## 2023-10-25 PROCEDURE — 3074F SYST BP LT 130 MM HG: CPT | Performed by: INTERNAL MEDICINE

## 2023-10-25 PROCEDURE — 3048F LDL-C <100 MG/DL: CPT | Performed by: INTERNAL MEDICINE

## 2023-10-25 PROCEDURE — 3051F HG A1C>EQUAL 7.0%<8.0%: CPT | Performed by: INTERNAL MEDICINE

## 2023-10-25 PROCEDURE — 99214 OFFICE O/P EST MOD 30 MIN: CPT | Performed by: INTERNAL MEDICINE

## 2023-10-25 PROCEDURE — 3079F DIAST BP 80-89 MM HG: CPT | Performed by: INTERNAL MEDICINE

## 2023-10-25 NOTE — PATIENT INSTRUCTIONS
Continue current medications    Referral to cardiac rehab placed    Ok to go back to work next week on Weds    Congrats on quitting smoking!    3 month follow up with bloodwork done just before visit

## 2023-10-25 NOTE — PROGRESS NOTES
Chief Complaint:   Hospital follow up     History Of Present Illness:    Francisco May is a 43 y.o. male History of prior DVT/PE in 2019, smoker, premature family history of CAD, STEMI 10/2023 with EMILY x1 to RCA    Here for hospital follow-up after stemi  Presented with several days of chest pain that he initially thought was indigestion.  The night prior to admission significant worsening of symptoms while driving.  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  Has not returned to work waiting on us.    HgA1c came back 7.5%    Of note he says DVT/PE in 2019 felt to be provoked due to his occupation.  He regularly would not get out of [employer] and walk around.  He took Eliquis for about 6 months.    CARDIAC CATH 10/15/23:  1. Inferior STEMI, now s/p PCI to mid RCA with 3.0 x 22 BRICE FRONTIER EMILY.   2. Right dominant coronary circulation, mid RCA culprit.   3. Moderate non-obstructive CAD elsewhere including distal left main (40%), Prox-mid LAD (50%), ramus (50%).   4. Left Ventricular end-diastolic pressure = 38.    ECHO 10/15/23:  1. Left ventricular systolic function is low normal with a 50-55% estimated ejection fraction.   2. Basal and mid inferior wall is abnormal.       Last Recorded Vitals:  Vitals:    10/25/23 1142   BP: 128/84   BP Location: Left arm   Pulse: 83   SpO2: 95%   Weight: 117 kg (257 lb 9.6 oz)       Past Medical History:  See HPI    Past Surgical History:  He has a past surgical history that includes Cardiac catheterization (N/A, 10/15/2023) and Cardiac catheterization (N/A, 10/15/2023).      Social History:  He reports that he has been smoking cigarettes. He has a 12.00 pack-year smoking history. He does not have any smokeless tobacco history on file. He reports that he does not currently use alcohol. He reports that he does not use drugs.    Family History:  Family History   Problem Relation Name Age of Onset     Heart attack Mother      Heart attack Father          Allergies:  Amoxicillin    Outpatient Medications:  Current Outpatient Medications   Medication Instructions    aspirin 81 mg, oral, Daily    atorvastatin (LIPITOR) 80 mg, oral, Nightly    metoprolol tartrate (LOPRESSOR) 25 mg, oral, 2 times daily    omeprazole (PriLOSEC) 20 mg DR capsule 1 capsule, oral, Daily RT    ticagrelor (Brilinta) 90 mg tablet Take 1 tablet (90 mg) by mouth 2 times a day.       Physical Exam:  GENERAL: alert, cooperative, pleasant, in no acute distress  SKIN: warm, dry, no rash.  NECK: no JVD, no RANDALL  CARDIAC: Regular rate and rhythm with no rubs, murmurs, or gallops  CHEST: Normal respiratory efforts, lungs clear to auscultation bilaterally.  ABDOMEN: soft, nontender, nondistended  EXTREMITIES: no edema  NEURO: Alert and oriented x 3.  Grossly normal.  Moves all 4 extremities.       Last Labs:  CBC -  Lab Results   Component Value Date    WBC 17.7 (H) 10/16/2023    HGB 15.3 10/16/2023    HCT 42.4 10/16/2023    MCV 87 10/16/2023     10/16/2023       CMP -  Lab Results   Component Value Date    CALCIUM 8.6 10/16/2023    PHOS 3.0 10/16/2023    PROT 6.7 10/15/2023    ALBUMIN 3.9 10/16/2023    AST 20 10/15/2023    ALT 18 10/15/2023    ALKPHOS 70 10/15/2023    BILITOT 0.4 10/15/2023       LIPID PANEL -   Lab Results   Component Value Date    CHOL 135 10/16/2023    TRIG 362 (H) 10/16/2023    HDL 22.8 10/16/2023    CHHDL 5.9 10/16/2023    VLDL 72 (H) 10/16/2023    NHDL 112 10/16/2023       RENAL FUNCTION PANEL -   Lab Results   Component Value Date    GLUCOSE 176 (H) 10/16/2023     (L) 10/16/2023    K 3.8 10/16/2023     10/16/2023    CO2 27 10/16/2023    ANIONGAP 10 10/16/2023    BUN 9 10/16/2023    CREATININE 0.94 10/16/2023    CALCIUM 8.6 10/16/2023    PHOS 3.0 10/16/2023    ALBUMIN 3.9 10/16/2023        Lab Results   Component Value Date    BNP 87 10/16/2023    HGBA1C 7.5 (H) 10/16/2023         Assessment/Plan   43 y.o.  male History of prior DVT/PE in 2019, former smoker, premature family history of CAD, STEMI 10/2023 with EMIYL x1 to RCA    #CAD  -asa, statin, brillinta, metoprolol  -Residual moderate atherosclerotic disease in LAD and ramus.  - Goal LDL less than 70.  Repeat lipids in about 3 months  -Referred to cardiac rehab    #DM2 - new dx  -he wants to try diet and exercise  -repeat A1c in 3 months.   -Needs to establish with primary care provider    #tobacco use  -now quit    #h/o DVT/PE - felt to be provoked. Completed 6 months therapy    Follow up 3 months     Tonio Baker DO

## 2023-10-26 PROBLEM — E11.9 DIABETES MELLITUS TYPE II, NON INSULIN DEPENDENT (MULTI): Status: ACTIVE | Noted: 2023-10-26

## 2023-10-26 PROBLEM — I82.4Y9 DEEP VEIN THROMBOSIS (DVT) OF PROXIMAL LOWER EXTREMITY (MULTI): Status: ACTIVE | Noted: 2023-10-26

## 2023-10-26 PROBLEM — I21.11 STEMI INVOLVING RIGHT CORONARY ARTERY (MULTI): Status: ACTIVE | Noted: 2023-10-26

## 2023-11-07 ENCOUNTER — APPOINTMENT (OUTPATIENT)
Dept: CARDIOLOGY | Facility: CLINIC | Age: 43
End: 2023-11-07

## 2023-11-14 DIAGNOSIS — I21.3 STEMI (ST ELEVATION MYOCARDIAL INFARCTION) (MULTI): ICD-10-CM

## 2023-11-14 RX ORDER — ATORVASTATIN CALCIUM 80 MG/1
80 TABLET, FILM COATED ORAL NIGHTLY
Qty: 90 TABLET | Refills: 3 | Status: SHIPPED | OUTPATIENT
Start: 2023-11-14 | End: 2024-11-13

## 2023-11-14 RX ORDER — METOPROLOL TARTRATE 25 MG/1
25 TABLET, FILM COATED ORAL 2 TIMES DAILY
Qty: 180 TABLET | Refills: 3 | Status: SHIPPED | OUTPATIENT
Start: 2023-11-14 | End: 2024-11-13

## 2023-11-14 RX ORDER — NAPROXEN SODIUM 220 MG/1
81 TABLET, FILM COATED ORAL DAILY
Qty: 90 TABLET | Refills: 3 | Status: SHIPPED | OUTPATIENT
Start: 2023-11-14 | End: 2024-11-13

## 2023-11-14 NOTE — TELEPHONE ENCOUNTER
Patient left [facility] requesting refill of medications. Seen in office post STEMI. Scripts cued for 1 year supply and forwarded to Dr. Baker to send to local pharmacy.

## 2025-04-27 ENCOUNTER — APPOINTMENT (OUTPATIENT)
Dept: CARDIOLOGY | Facility: HOSPITAL | Age: 45
End: 2025-04-27

## 2025-04-27 ENCOUNTER — APPOINTMENT (OUTPATIENT)
Dept: RADIOLOGY | Facility: HOSPITAL | Age: 45
End: 2025-04-27

## 2025-04-27 ENCOUNTER — HOSPITAL ENCOUNTER (INPATIENT)
Facility: HOSPITAL | Age: 45
LOS: 3 days | Discharge: HOME | End: 2025-04-30
Attending: EMERGENCY MEDICINE | Admitting: HOSPITALIST

## 2025-04-27 DIAGNOSIS — I21.4 NSTEMI (NON-ST ELEVATED MYOCARDIAL INFARCTION) (MULTI): ICD-10-CM

## 2025-04-27 DIAGNOSIS — I21.11 STEMI INVOLVING RIGHT CORONARY ARTERY (MULTI): Primary | ICD-10-CM

## 2025-04-27 DIAGNOSIS — I21.3 STEMI (ST ELEVATION MYOCARDIAL INFARCTION) (MULTI): ICD-10-CM

## 2025-04-27 DIAGNOSIS — E11.9 DIABETES MELLITUS TYPE II, NON INSULIN DEPENDENT (MULTI): ICD-10-CM

## 2025-04-27 LAB
ALBUMIN SERPL BCP-MCNC: 4.2 G/DL (ref 3.4–5)
ALP SERPL-CCNC: 85 U/L (ref 33–120)
ALT SERPL W P-5'-P-CCNC: 23 U/L (ref 10–52)
ANION GAP SERPL CALC-SCNC: 11 MMOL/L (ref 10–20)
APPEARANCE UR: CLEAR
APTT PPP: 26 SECONDS (ref 26–36)
AST SERPL W P-5'-P-CCNC: 81 U/L (ref 9–39)
BASOPHILS # BLD AUTO: 0.05 X10*3/UL (ref 0–0.1)
BASOPHILS NFR BLD AUTO: 0.3 %
BILIRUB SERPL-MCNC: 1 MG/DL (ref 0–1.2)
BILIRUB UR STRIP.AUTO-MCNC: NEGATIVE MG/DL
BUN SERPL-MCNC: 9 MG/DL (ref 6–23)
CALCIUM SERPL-MCNC: 9.5 MG/DL (ref 8.6–10.3)
CARDIAC TROPONIN I PNL SERPL HS: ABNORMAL NG/L (ref 0–20)
CARDIAC TROPONIN I PNL SERPL HS: ABNORMAL NG/L (ref 0–20)
CHLORIDE SERPL-SCNC: 99 MMOL/L (ref 98–107)
CO2 SERPL-SCNC: 26 MMOL/L (ref 21–32)
COLOR UR: ABNORMAL
CREAT SERPL-MCNC: 0.82 MG/DL (ref 0.5–1.3)
EGFRCR SERPLBLD CKD-EPI 2021: >90 ML/MIN/1.73M*2
EOSINOPHIL # BLD AUTO: 0.11 X10*3/UL (ref 0–0.7)
EOSINOPHIL NFR BLD AUTO: 0.7 %
ERYTHROCYTE [DISTWIDTH] IN BLOOD BY AUTOMATED COUNT: 12.3 % (ref 11.5–14.5)
GLUCOSE SERPL-MCNC: 335 MG/DL (ref 74–99)
GLUCOSE UR STRIP.AUTO-MCNC: ABNORMAL MG/DL
HCT VFR BLD AUTO: 46.3 % (ref 41–52)
HGB BLD-MCNC: 16.6 G/DL (ref 13.5–17.5)
IMM GRANULOCYTES # BLD AUTO: 0.06 X10*3/UL (ref 0–0.7)
IMM GRANULOCYTES NFR BLD AUTO: 0.4 % (ref 0–0.9)
INR PPP: 1.1 (ref 0.9–1.1)
KETONES UR STRIP.AUTO-MCNC: ABNORMAL MG/DL
LEUKOCYTE ESTERASE UR QL STRIP.AUTO: ABNORMAL
LIPASE SERPL-CCNC: 142 U/L (ref 9–82)
LYMPHOCYTES # BLD AUTO: 3.06 X10*3/UL (ref 1.2–4.8)
LYMPHOCYTES NFR BLD AUTO: 19.1 %
MCH RBC QN AUTO: 30.3 PG (ref 26–34)
MCHC RBC AUTO-ENTMCNC: 35.9 G/DL (ref 32–36)
MCV RBC AUTO: 85 FL (ref 80–100)
MONOCYTES # BLD AUTO: 1.44 X10*3/UL (ref 0.1–1)
MONOCYTES NFR BLD AUTO: 9 %
MUCOUS THREADS #/AREA URNS AUTO: ABNORMAL /LPF
NEUTROPHILS # BLD AUTO: 11.29 X10*3/UL (ref 1.2–7.7)
NEUTROPHILS NFR BLD AUTO: 70.5 %
NITRITE UR QL STRIP.AUTO: NEGATIVE
NRBC BLD-RTO: 0 /100 WBCS (ref 0–0)
PH UR STRIP.AUTO: 5 [PH]
PLATELET # BLD AUTO: 231 X10*3/UL (ref 150–450)
POTASSIUM SERPL-SCNC: 4.3 MMOL/L (ref 3.5–5.3)
PROT SERPL-MCNC: 7.2 G/DL (ref 6.4–8.2)
PROT UR STRIP.AUTO-MCNC: NEGATIVE MG/DL
PROTHROMBIN TIME: 12.2 SECONDS (ref 9.8–12.4)
RBC # BLD AUTO: 5.48 X10*6/UL (ref 4.5–5.9)
RBC # UR STRIP.AUTO: NEGATIVE MG/DL
RBC #/AREA URNS AUTO: ABNORMAL /HPF
SODIUM SERPL-SCNC: 132 MMOL/L (ref 136–145)
SP GR UR STRIP.AUTO: 1.02
UROBILINOGEN UR STRIP.AUTO-MCNC: NORMAL MG/DL
WBC # BLD AUTO: 16 X10*3/UL (ref 4.4–11.3)
WBC #/AREA URNS AUTO: ABNORMAL /HPF

## 2025-04-27 PROCEDURE — 81003 URINALYSIS AUTO W/O SCOPE: CPT | Performed by: HOSPITALIST

## 2025-04-27 PROCEDURE — 2060000001 HC INTERMEDIATE ICU ROOM DAILY

## 2025-04-27 PROCEDURE — 85730 THROMBOPLASTIN TIME PARTIAL: CPT | Performed by: EMERGENCY MEDICINE

## 2025-04-27 PROCEDURE — 84484 ASSAY OF TROPONIN QUANT: CPT | Performed by: EMERGENCY MEDICINE

## 2025-04-27 PROCEDURE — 80053 COMPREHEN METABOLIC PANEL: CPT | Performed by: EMERGENCY MEDICINE

## 2025-04-27 PROCEDURE — 71046 X-RAY EXAM CHEST 2 VIEWS: CPT | Performed by: RADIOLOGY

## 2025-04-27 PROCEDURE — 2500000004 HC RX 250 GENERAL PHARMACY W/ HCPCS (ALT 636 FOR OP/ED): Mod: JZ | Performed by: EMERGENCY MEDICINE

## 2025-04-27 PROCEDURE — 93005 ELECTROCARDIOGRAM TRACING: CPT

## 2025-04-27 PROCEDURE — 85025 COMPLETE CBC W/AUTO DIFF WBC: CPT | Performed by: EMERGENCY MEDICINE

## 2025-04-27 PROCEDURE — 85610 PROTHROMBIN TIME: CPT | Performed by: EMERGENCY MEDICINE

## 2025-04-27 PROCEDURE — 2500000001 HC RX 250 WO HCPCS SELF ADMINISTERED DRUGS (ALT 637 FOR MEDICARE OP): Performed by: EMERGENCY MEDICINE

## 2025-04-27 PROCEDURE — 87086 URINE CULTURE/COLONY COUNT: CPT | Mod: AHULAB | Performed by: HOSPITALIST

## 2025-04-27 PROCEDURE — 99291 CRITICAL CARE FIRST HOUR: CPT | Performed by: EMERGENCY MEDICINE

## 2025-04-27 PROCEDURE — 99223 1ST HOSP IP/OBS HIGH 75: CPT | Performed by: HOSPITALIST

## 2025-04-27 PROCEDURE — 71046 X-RAY EXAM CHEST 2 VIEWS: CPT

## 2025-04-27 PROCEDURE — 83036 HEMOGLOBIN GLYCOSYLATED A1C: CPT | Mod: AHULAB | Performed by: HOSPITALIST

## 2025-04-27 PROCEDURE — 36415 COLL VENOUS BLD VENIPUNCTURE: CPT | Performed by: EMERGENCY MEDICINE

## 2025-04-27 PROCEDURE — 99285 EMERGENCY DEPT VISIT HI MDM: CPT | Mod: 25 | Performed by: EMERGENCY MEDICINE

## 2025-04-27 PROCEDURE — 2500000004 HC RX 250 GENERAL PHARMACY W/ HCPCS (ALT 636 FOR OP/ED): Mod: JZ | Performed by: HOSPITALIST

## 2025-04-27 PROCEDURE — 2500000001 HC RX 250 WO HCPCS SELF ADMINISTERED DRUGS (ALT 637 FOR MEDICARE OP): Performed by: HOSPITALIST

## 2025-04-27 PROCEDURE — 83690 ASSAY OF LIPASE: CPT | Performed by: EMERGENCY MEDICINE

## 2025-04-27 PROCEDURE — 2500000005 HC RX 250 GENERAL PHARMACY W/O HCPCS: Performed by: EMERGENCY MEDICINE

## 2025-04-27 PROCEDURE — 96374 THER/PROPH/DIAG INJ IV PUSH: CPT

## 2025-04-27 RX ORDER — TALC
3 POWDER (GRAM) TOPICAL NIGHTLY PRN
Status: DISCONTINUED | OUTPATIENT
Start: 2025-04-27 | End: 2025-04-30 | Stop reason: HOSPADM

## 2025-04-27 RX ORDER — ONDANSETRON HYDROCHLORIDE 2 MG/ML
4 INJECTION, SOLUTION INTRAVENOUS EVERY 8 HOURS PRN
Status: DISCONTINUED | OUTPATIENT
Start: 2025-04-27 | End: 2025-04-30 | Stop reason: HOSPADM

## 2025-04-27 RX ORDER — INSULIN LISPRO 100 [IU]/ML
0-10 INJECTION, SOLUTION INTRAVENOUS; SUBCUTANEOUS
Status: DISCONTINUED | OUTPATIENT
Start: 2025-04-28 | End: 2025-04-29

## 2025-04-27 RX ORDER — MORPHINE SULFATE 2 MG/ML
2 INJECTION, SOLUTION INTRAMUSCULAR; INTRAVENOUS ONCE AS NEEDED
Status: COMPLETED | OUTPATIENT
Start: 2025-04-27 | End: 2025-04-27

## 2025-04-27 RX ORDER — SENNOSIDES 8.6 MG/1
2 TABLET ORAL 2 TIMES DAILY
Status: DISCONTINUED | OUTPATIENT
Start: 2025-04-27 | End: 2025-04-30 | Stop reason: HOSPADM

## 2025-04-27 RX ORDER — NITROGLYCERIN 0.4 MG/1
0.4 TABLET SUBLINGUAL EVERY 5 MIN PRN
Status: DISCONTINUED | OUTPATIENT
Start: 2025-04-27 | End: 2025-04-30 | Stop reason: HOSPADM

## 2025-04-27 RX ORDER — ONDANSETRON 4 MG/1
4 TABLET, ORALLY DISINTEGRATING ORAL EVERY 8 HOURS PRN
Status: DISCONTINUED | OUTPATIENT
Start: 2025-04-27 | End: 2025-04-30 | Stop reason: HOSPADM

## 2025-04-27 RX ORDER — PANTOPRAZOLE SODIUM 20 MG/1
20 TABLET, DELAYED RELEASE ORAL
Status: DISCONTINUED | OUTPATIENT
Start: 2025-04-28 | End: 2025-04-30 | Stop reason: HOSPADM

## 2025-04-27 RX ORDER — DEXTROSE 50 % IN WATER (D50W) INTRAVENOUS SYRINGE
25
Status: DISCONTINUED | OUTPATIENT
Start: 2025-04-27 | End: 2025-04-30 | Stop reason: HOSPADM

## 2025-04-27 RX ORDER — ATORVASTATIN CALCIUM 80 MG/1
80 TABLET, FILM COATED ORAL NIGHTLY
Status: DISCONTINUED | OUTPATIENT
Start: 2025-04-27 | End: 2025-04-30 | Stop reason: HOSPADM

## 2025-04-27 RX ORDER — NAPROXEN SODIUM 220 MG/1
324 TABLET, FILM COATED ORAL ONCE
Status: COMPLETED | OUTPATIENT
Start: 2025-04-27 | End: 2025-04-27

## 2025-04-27 RX ORDER — ASPIRIN 81 MG/1
81 TABLET ORAL DAILY
Status: DISCONTINUED | OUTPATIENT
Start: 2025-04-28 | End: 2025-04-30 | Stop reason: HOSPADM

## 2025-04-27 RX ORDER — DEXTROSE 50 % IN WATER (D50W) INTRAVENOUS SYRINGE
12.5
Status: DISCONTINUED | OUTPATIENT
Start: 2025-04-27 | End: 2025-04-30 | Stop reason: HOSPADM

## 2025-04-27 RX ORDER — ACETAMINOPHEN 325 MG/1
650 TABLET ORAL EVERY 4 HOURS PRN
Status: DISCONTINUED | OUTPATIENT
Start: 2025-04-27 | End: 2025-04-30 | Stop reason: HOSPADM

## 2025-04-27 RX ORDER — HEPARIN SODIUM 10000 [USP'U]/100ML
0-4000 INJECTION, SOLUTION INTRAVENOUS CONTINUOUS
Status: DISCONTINUED | OUTPATIENT
Start: 2025-04-27 | End: 2025-04-28

## 2025-04-27 RX ORDER — METOPROLOL TARTRATE 25 MG/1
25 TABLET, FILM COATED ORAL 2 TIMES DAILY
Status: DISCONTINUED | OUTPATIENT
Start: 2025-04-27 | End: 2025-04-29

## 2025-04-27 RX ADMIN — METOPROLOL TARTRATE 25 MG: 25 TABLET, FILM COATED ORAL at 22:44

## 2025-04-27 RX ADMIN — NITROGLYCERIN 1 INCH: 20 OINTMENT TOPICAL at 21:20

## 2025-04-27 RX ADMIN — HEPARIN SODIUM 1000 UNITS/HR: 10000 INJECTION, SOLUTION INTRAVENOUS at 20:48

## 2025-04-27 RX ADMIN — MORPHINE SULFATE 2 MG: 2 INJECTION, SOLUTION INTRAMUSCULAR; INTRAVENOUS at 22:44

## 2025-04-27 RX ADMIN — ASPIRIN 324 MG: 81 TABLET, CHEWABLE ORAL at 20:46

## 2025-04-27 RX ADMIN — ATORVASTATIN CALCIUM 80 MG: 80 TABLET, FILM COATED ORAL at 22:44

## 2025-04-27 SDOH — SOCIAL STABILITY: SOCIAL INSECURITY: HAVE YOU HAD THOUGHTS OF HARMING ANYONE ELSE?: NO

## 2025-04-27 SDOH — SOCIAL STABILITY: SOCIAL INSECURITY: DOES ANYONE TRY TO KEEP YOU FROM HAVING/CONTACTING OTHER FRIENDS OR DOING THINGS OUTSIDE YOUR HOME?: NO

## 2025-04-27 SDOH — SOCIAL STABILITY: SOCIAL INSECURITY
WITHIN THE LAST YEAR, HAVE YOU BEEN RAPED OR FORCED TO HAVE ANY KIND OF SEXUAL ACTIVITY BY YOUR PARTNER OR EX-PARTNER?: NO

## 2025-04-27 SDOH — SOCIAL STABILITY: SOCIAL INSECURITY: ABUSE: ADULT

## 2025-04-27 SDOH — SOCIAL STABILITY: SOCIAL INSECURITY
WITHIN THE LAST YEAR, HAVE YOU BEEN KICKED, HIT, SLAPPED, OR OTHERWISE PHYSICALLY HURT BY YOUR PARTNER OR EX-PARTNER?: NO

## 2025-04-27 SDOH — ECONOMIC STABILITY: FOOD INSECURITY: WITHIN THE PAST 12 MONTHS, THE FOOD YOU BOUGHT JUST DIDN'T LAST AND YOU DIDN'T HAVE MONEY TO GET MORE.: NEVER TRUE

## 2025-04-27 SDOH — SOCIAL STABILITY: SOCIAL INSECURITY: WITHIN THE LAST YEAR, HAVE YOU BEEN HUMILIATED OR EMOTIONALLY ABUSED IN OTHER WAYS BY YOUR PARTNER OR EX-PARTNER?: NO

## 2025-04-27 SDOH — ECONOMIC STABILITY: FOOD INSECURITY: WITHIN THE PAST 12 MONTHS, YOU WORRIED THAT YOUR FOOD WOULD RUN OUT BEFORE YOU GOT THE MONEY TO BUY MORE.: NEVER TRUE

## 2025-04-27 SDOH — SOCIAL STABILITY: SOCIAL INSECURITY: WERE YOU ABLE TO COMPLETE ALL THE BEHAVIORAL HEALTH SCREENINGS?: YES

## 2025-04-27 SDOH — SOCIAL STABILITY: SOCIAL INSECURITY: WITHIN THE LAST YEAR, HAVE YOU BEEN AFRAID OF YOUR PARTNER OR EX-PARTNER?: NO

## 2025-04-27 SDOH — ECONOMIC STABILITY: INCOME INSECURITY: IN THE PAST 12 MONTHS HAS THE ELECTRIC, GAS, OIL, OR WATER COMPANY THREATENED TO SHUT OFF SERVICES IN YOUR HOME?: NO

## 2025-04-27 SDOH — SOCIAL STABILITY: SOCIAL INSECURITY: ARE YOU OR HAVE YOU BEEN THREATENED OR ABUSED PHYSICALLY, EMOTIONALLY, OR SEXUALLY BY ANYONE?: NO

## 2025-04-27 SDOH — SOCIAL STABILITY: SOCIAL INSECURITY: DO YOU FEEL UNSAFE GOING BACK TO THE PLACE WHERE YOU ARE LIVING?: NO

## 2025-04-27 SDOH — SOCIAL STABILITY: SOCIAL INSECURITY: HAVE YOU HAD ANY THOUGHTS OF HARMING ANYONE ELSE?: NO

## 2025-04-27 SDOH — SOCIAL STABILITY: SOCIAL INSECURITY: ARE THERE ANY APPARENT SIGNS OF INJURIES/BEHAVIORS THAT COULD BE RELATED TO ABUSE/NEGLECT?: NO

## 2025-04-27 SDOH — SOCIAL STABILITY: SOCIAL INSECURITY: DO YOU FEEL ANYONE HAS EXPLOITED OR TAKEN ADVANTAGE OF YOU FINANCIALLY OR OF YOUR PERSONAL PROPERTY?: NO

## 2025-04-27 SDOH — SOCIAL STABILITY: SOCIAL INSECURITY: HAS ANYONE EVER THREATENED TO HURT YOUR FAMILY OR YOUR PETS?: NO

## 2025-04-27 ASSESSMENT — PATIENT HEALTH QUESTIONNAIRE - PHQ9
2. FEELING DOWN, DEPRESSED OR HOPELESS: NOT AT ALL
SUM OF ALL RESPONSES TO PHQ9 QUESTIONS 1 & 2: 0
1. LITTLE INTEREST OR PLEASURE IN DOING THINGS: NOT AT ALL

## 2025-04-27 ASSESSMENT — PAIN DESCRIPTION - DESCRIPTORS
DESCRIPTORS: ACHING

## 2025-04-27 ASSESSMENT — COGNITIVE AND FUNCTIONAL STATUS - GENERAL
PATIENT BASELINE BEDBOUND: NO
DAILY ACTIVITIY SCORE: 24
MOBILITY SCORE: 24
MOBILITY SCORE: 24
DAILY ACTIVITIY SCORE: 24

## 2025-04-27 ASSESSMENT — PAIN SCALES - GENERAL
PAINLEVEL_OUTOF10: 1
PAINLEVEL_OUTOF10: 5 - MODERATE PAIN
PAINLEVEL_OUTOF10: 1
PAINLEVEL_OUTOF10: 2
PAINLEVEL_OUTOF10: 5 - MODERATE PAIN

## 2025-04-27 ASSESSMENT — ACTIVITIES OF DAILY LIVING (ADL)
HEARING - LEFT EAR: FUNCTIONAL
FEEDING YOURSELF: INDEPENDENT
HEARING - RIGHT EAR: FUNCTIONAL
BATHING: INDEPENDENT
JUDGMENT_ADEQUATE_SAFELY_COMPLETE_DAILY_ACTIVITIES: YES
LACK_OF_TRANSPORTATION: NO
ADEQUATE_TO_COMPLETE_ADL: YES
DRESSING YOURSELF: INDEPENDENT
PATIENT'S MEMORY ADEQUATE TO SAFELY COMPLETE DAILY ACTIVITIES?: YES
TOILETING: INDEPENDENT
GROOMING: INDEPENDENT
WALKS IN HOME: INDEPENDENT

## 2025-04-27 ASSESSMENT — PAIN - FUNCTIONAL ASSESSMENT
PAIN_FUNCTIONAL_ASSESSMENT: 0-10

## 2025-04-27 ASSESSMENT — COLUMBIA-SUICIDE SEVERITY RATING SCALE - C-SSRS
2. HAVE YOU ACTUALLY HAD ANY THOUGHTS OF KILLING YOURSELF?: NO
1. IN THE PAST MONTH, HAVE YOU WISHED YOU WERE DEAD OR WISHED YOU COULD GO TO SLEEP AND NOT WAKE UP?: NO
6. HAVE YOU EVER DONE ANYTHING, STARTED TO DO ANYTHING, OR PREPARED TO DO ANYTHING TO END YOUR LIFE?: NO

## 2025-04-27 ASSESSMENT — LIFESTYLE VARIABLES
HOW MANY STANDARD DRINKS CONTAINING ALCOHOL DO YOU HAVE ON A TYPICAL DAY: PATIENT DOES NOT DRINK
HOW OFTEN DO YOU HAVE A DRINK CONTAINING ALCOHOL: NEVER
AUDIT-C TOTAL SCORE: 0
SKIP TO QUESTIONS 9-10: 1
AUDIT-C TOTAL SCORE: 0
HOW OFTEN DO YOU HAVE 6 OR MORE DRINKS ON ONE OCCASION: NEVER

## 2025-04-27 ASSESSMENT — PAIN DESCRIPTION - LOCATION
LOCATION: CHEST
LOCATION: CHEST

## 2025-04-27 ASSESSMENT — PAIN DESCRIPTION - ORIENTATION: ORIENTATION: MID

## 2025-04-27 NOTE — ED TRIAGE NOTES
Pt to ed w c/o cp since 1200 today. Pt states the pain is constant in her sternal area. Pt states he has hx of MI 2 years ago and that he is currently not taking his prescribed blood thinners. Pt also endorses sob.

## 2025-04-27 NOTE — LETTER
April 30, 2025     Patient: Francisco May   YOB: 1980   Date of Visit: 4/27/2025       To Whom It May Concern:    Francisco May was admitted to [facility] on 4/27/2025, and discharged on 4/30/2025.  He underwent a cardiac procedure, and needed his spouse Jigna May to be at bedside during this time.    Please excuse Jigna May for her absence from work as she would needed to be at her  bedside during this hospitalization.    If you have any questions or concerns, please don't hesitate to call.         Sincerely,     ____________________________________________________      Slade Renee DO

## 2025-04-27 NOTE — ED PROVIDER NOTES
HPI   Chief Complaint   Patient presents with    Chest Pain       Chief complaint: Chest pain    History of present illness: Patient is a 45-year-old male with history of coronary artery disease status post stent in 2023 presenting to the emergency department with complaints of chest pain.  According to the patient, at approximately 1:00 this afternoon he began to experience chest pain.  He describes it as a bubble in the center of his chest patient denies any diaphoresis or shortness of breath with this.  The patient states that this feels very similar to when he had a heart attack in the past.  Patient admits that he does not follow-up with cardiology.  The patient does take 2 aspirin a day but does not take any other blood thinners citing that they are unaffordable.  Concerned that the pain is not going away, the patient presents to the emergency department for further evaluation.      History provided by:  Patient   used: No            Patient History   Medical History[1]  Surgical History[2]  Family History[3]  Social History[4]    Physical Exam   ED Triage Vitals [04/27/25 1918]   Temperature Heart Rate Respirations BP   36.1 °C (97 °F) 93 18 119/76      Pulse Ox Temp Source Heart Rate Source Patient Position   97 % Temporal Monitor --      BP Location FiO2 (%)     -- --       Physical Exam  Vitals and nursing note reviewed.   Constitutional:       General: He is not in acute distress.     Appearance: He is well-developed. He is obese.   HENT:      Head: Normocephalic and atraumatic.   Eyes:      Conjunctiva/sclera: Conjunctivae normal.   Cardiovascular:      Rate and Rhythm: Normal rate and regular rhythm.      Heart sounds: No murmur heard.  Pulmonary:      Effort: Pulmonary effort is normal. No respiratory distress.      Breath sounds: Normal breath sounds.   Abdominal:      Palpations: Abdomen is soft.      Tenderness: There is no abdominal tenderness.   Musculoskeletal:          General: No swelling.      Cervical back: Neck supple.   Skin:     General: Skin is warm and dry.      Capillary Refill: Capillary refill takes less than 2 seconds.   Neurological:      Mental Status: He is alert.   Psychiatric:         Mood and Affect: Mood normal.           ED Course & MDM   Diagnoses as of 04/27/25 2043   NSTEMI (non-ST elevated myocardial infarction) (Multi)                 No data recorded                                 Medical Decision Making  Medical Decision Making: Patient presents to the emergency department with complaints of chest pain.  The patient's CBC demonstrated white cell count of 16 but no other significant abnormalities Chem-7 and LFTs were all within normal limits lipase 142 INR was 1.1 APTT was 26 urinalysis demonstrated 250 leuks but no other significant abnormalities chest x-ray demonstrated no significant abnormalities.  The patient's troponin was 12,213 uptrending 1 hour later to 13,350.  The patient's EKG demonstrated a normal sinus rhythm with a rate of 92 bpm isoelectric ST segments narrow QRS complexes and a QTc of 452.  Posterior EKG demonstrated normal sinus rhythm with a rate of 79 bpm isoelectric ST segments narrow QRS complexes and a QTc of 456.    Patient presents the emergency department with complaints of chest pain.  Workup was performed above and demonstrated the presence of an NSTEMI.  Patient was given aspirin nitroglycerin paste as well as a heparin bolus and drip.  I spoke with the hospitalist who agreed the patient to be admitted to their service for further evaluation and therapy.  I spoke with the patient and family and explained the findings of today's results they expressed understanding the patient was admitted to the hospital in otherwise stable condition.    Amount and/or Complexity of Data Reviewed  Labs: ordered. Decision-making details documented in ED Course.  Radiology: ordered. Decision-making details documented in ED Course.  ECG/medicine  tests: ordered and independent interpretation performed. Decision-making details documented in ED Course.        Procedure  Critical Care    Performed by: Tone Guo MD  Authorized by: Tone Guo MD    Critical care provider statement:     Critical care time (minutes):  45    Critical care time was exclusive of:  Separately billable procedures and treating other patients    Critical care was necessary to treat or prevent imminent or life-threatening deterioration of the following conditions:  Cardiac failure    Critical care was time spent personally by me on the following activities:  Blood draw for specimens, evaluation of patient's response to treatment, examination of patient, obtaining history from patient or surrogate, ordering and performing treatments and interventions, ordering and review of laboratory studies, ordering and review of radiographic studies, pulse oximetry, re-evaluation of patient's condition and review of old charts    Care discussed with: admitting provider             [1]   Past Medical History:  Diagnosis Date    Deep vein thrombosis (DVT) of proximal lower extremity (Multi) 10/26/2023    Diabetes mellitus type II, non insulin dependent (Multi) 10/26/2023    STEMI involving right coronary artery (Multi) 10/26/2023   [2]   Past Surgical History:  Procedure Laterality Date    CARDIAC CATHETERIZATION N/A 10/15/2023    Procedure: Left Heart Cath, No LV;  Surgeon: Juan Manuel Hall MD;  Location: [facility] Cardiac Cath Lab;  Service: Cardiovascular;  Laterality: N/A;    CARDIAC CATHETERIZATION N/A 10/15/2023    Procedure: PCI;  Surgeon: Juan Manuel Hall MD;  Location: [facility] Cardiac Cath Lab;  Service: Cardiovascular;  Laterality: N/A;   [3]   Family History  Problem Relation Name Age of Onset    Heart attack Mother      Heart attack Father     [4]   Social History  Tobacco Use    Smoking status: Former     Current packs/day: 1.50     Average packs/day: 1.5 packs/day for 8.0 years (12.0 ttl  pk-yrs)     Types: Cigarettes    Smokeless tobacco: Not on file   Substance Use Topics    Alcohol use: Not Currently    Drug use: Never        Tone Guo MD  05/01/25 0420

## 2025-04-28 ENCOUNTER — APPOINTMENT (OUTPATIENT)
Dept: CARDIOLOGY | Facility: HOSPITAL | Age: 45
End: 2025-04-28

## 2025-04-28 LAB
ABO GROUP (TYPE) IN BLOOD: NORMAL
ACT BLD: 250 SEC (ref 83–199)
ACT BLD: 255 SEC (ref 83–199)
ACT BLD: 366 SEC (ref 83–199)
ANION GAP SERPL CALC-SCNC: 7 MMOL/L (ref 10–20)
ANION GAP SERPL CALC-SCNC: 9 MMOL/L (ref 10–20)
ANTIBODY SCREEN: NORMAL
AORTIC VALVE MEAN GRADIENT: 1 MMHG
AORTIC VALVE PEAK VELOCITY: 0.7 M/S
ATRIAL RATE: 79 BPM
ATRIAL RATE: 79 BPM
ATRIAL RATE: 92 BPM
AV PEAK GRADIENT: 2 MMHG
AVA (PEAK VEL): 3.16 CM2
AVA (VTI): 3.73 CM2
BASOPHILS # BLD AUTO: 0.04 X10*3/UL (ref 0–0.1)
BASOPHILS NFR BLD AUTO: 0.3 %
BODY SURFACE AREA: 2.27 M2
BUN SERPL-MCNC: 10 MG/DL (ref 6–23)
BUN SERPL-MCNC: 8 MG/DL (ref 6–23)
CALCIUM SERPL-MCNC: 6.4 MG/DL (ref 8.6–10.3)
CALCIUM SERPL-MCNC: 8.7 MG/DL (ref 8.6–10.3)
CARDIAC TROPONIN I PNL SERPL HS: ABNORMAL NG/L (ref 0–20)
CHLORIDE SERPL-SCNC: 104 MMOL/L (ref 98–107)
CHLORIDE SERPL-SCNC: 114 MMOL/L (ref 98–107)
CO2 SERPL-SCNC: 20 MMOL/L (ref 21–32)
CO2 SERPL-SCNC: 25 MMOL/L (ref 21–32)
CREAT SERPL-MCNC: 0.51 MG/DL (ref 0.5–1.3)
CREAT SERPL-MCNC: 0.75 MG/DL (ref 0.5–1.3)
EGFRCR SERPLBLD CKD-EPI 2021: >90 ML/MIN/1.73M*2
EGFRCR SERPLBLD CKD-EPI 2021: >90 ML/MIN/1.73M*2
EJECTION FRACTION APICAL 4 CHAMBER: 32.8
EJECTION FRACTION: 28 %
EOSINOPHIL # BLD AUTO: 0.12 X10*3/UL (ref 0–0.7)
EOSINOPHIL NFR BLD AUTO: 0.9 %
ERYTHROCYTE [DISTWIDTH] IN BLOOD BY AUTOMATED COUNT: 12.5 % (ref 11.5–14.5)
EST. AVERAGE GLUCOSE BLD GHB EST-MCNC: 278 MG/DL
GLUCOSE BLD MANUAL STRIP-MCNC: 268 MG/DL (ref 74–99)
GLUCOSE BLD MANUAL STRIP-MCNC: 293 MG/DL (ref 74–99)
GLUCOSE BLD MANUAL STRIP-MCNC: 296 MG/DL (ref 74–99)
GLUCOSE BLD MANUAL STRIP-MCNC: 319 MG/DL (ref 74–99)
GLUCOSE BLD MANUAL STRIP-MCNC: 319 MG/DL (ref 74–99)
GLUCOSE SERPL-MCNC: 223 MG/DL (ref 74–99)
GLUCOSE SERPL-MCNC: 287 MG/DL (ref 74–99)
HBA1C MFR BLD: 11.3 % (ref ?–5.7)
HCT VFR BLD AUTO: 44.2 % (ref 41–52)
HGB BLD-MCNC: 15.4 G/DL (ref 13.5–17.5)
IMM GRANULOCYTES # BLD AUTO: 0.05 X10*3/UL (ref 0–0.7)
IMM GRANULOCYTES NFR BLD AUTO: 0.4 % (ref 0–0.9)
LEFT ATRIUM VOLUME AREA LENGTH INDEX BSA: 24.4 ML/M2
LEFT VENTRICLE INTERNAL DIMENSION DIASTOLE: 4.52 CM (ref 3.5–6)
LEFT VENTRICULAR OUTFLOW TRACT DIAMETER: 2 CM
LYMPHOCYTES # BLD AUTO: 2.68 X10*3/UL (ref 1.2–4.8)
LYMPHOCYTES NFR BLD AUTO: 19.4 %
MAGNESIUM SERPL-MCNC: 1.55 MG/DL (ref 1.6–2.4)
MCH RBC QN AUTO: 30 PG (ref 26–34)
MCHC RBC AUTO-ENTMCNC: 34.8 G/DL (ref 32–36)
MCV RBC AUTO: 86 FL (ref 80–100)
MITRAL VALVE E/A RATIO: 1.39
MONOCYTES # BLD AUTO: 1.22 X10*3/UL (ref 0.1–1)
MONOCYTES NFR BLD AUTO: 8.9 %
NEUTROPHILS # BLD AUTO: 9.67 X10*3/UL (ref 1.2–7.7)
NEUTROPHILS NFR BLD AUTO: 70.1 %
NRBC BLD-RTO: 0 /100 WBCS (ref 0–0)
P AXIS: 14 DEGREES
P AXIS: 31 DEGREES
P AXIS: 37 DEGREES
P OFFSET: 195 MS
P OFFSET: 196 MS
P OFFSET: 206 MS
P ONSET: 144 MS
P ONSET: 147 MS
P ONSET: 158 MS
PLATELET # BLD AUTO: 192 X10*3/UL (ref 150–450)
POTASSIUM SERPL-SCNC: 2.9 MMOL/L (ref 3.5–5.3)
POTASSIUM SERPL-SCNC: 4.4 MMOL/L (ref 3.5–5.3)
POTASSIUM SERPL-SCNC: 4.5 MMOL/L (ref 3.5–5.3)
PR INTERVAL: 138 MS
PR INTERVAL: 138 MS
PR INTERVAL: 144 MS
Q ONSET: 216 MS
Q ONSET: 216 MS
Q ONSET: 227 MS
QRS COUNT: 13 BEATS
QRS COUNT: 13 BEATS
QRS COUNT: 15 BEATS
QRS DURATION: 82 MS
QRS DURATION: 84 MS
QRS DURATION: 92 MS
QT INTERVAL: 366 MS
QT INTERVAL: 396 MS
QT INTERVAL: 398 MS
QTC CALCULATION(BAZETT): 452 MS
QTC CALCULATION(BAZETT): 454 MS
QTC CALCULATION(BAZETT): 456 MS
QTC FREDERICIA: 422 MS
QTC FREDERICIA: 434 MS
QTC FREDERICIA: 436 MS
R AXIS: 18 DEGREES
R AXIS: 23 DEGREES
R AXIS: 41 DEGREES
RBC # BLD AUTO: 5.14 X10*6/UL (ref 4.5–5.9)
RH FACTOR (ANTIGEN D): NORMAL
RIGHT VENTRICLE FREE WALL PEAK S': 10.99 CM/S
SODIUM SERPL-SCNC: 133 MMOL/L (ref 136–145)
SODIUM SERPL-SCNC: 138 MMOL/L (ref 136–145)
T AXIS: 37 DEGREES
T AXIS: 54 DEGREES
T AXIS: 59 DEGREES
T OFFSET: 399 MS
T OFFSET: 415 MS
T OFFSET: 425 MS
TRICUSPID ANNULAR PLANE SYSTOLIC EXCURSION: 1.9 CM
UFH PPP CHRO-ACNC: 0.1 IU/ML (ref ?–1.1)
UFH PPP CHRO-ACNC: 0.1 IU/ML (ref ?–1.1)
VENTRICULAR RATE: 79 BPM
VENTRICULAR RATE: 79 BPM
VENTRICULAR RATE: 92 BPM
WBC # BLD AUTO: 13.8 X10*3/UL (ref 4.4–11.3)

## 2025-04-28 PROCEDURE — 92978 ENDOLUMINL IVUS OCT C 1ST: CPT | Performed by: INTERNAL MEDICINE

## 2025-04-28 PROCEDURE — C1769 GUIDE WIRE: HCPCS | Performed by: INTERNAL MEDICINE

## 2025-04-28 PROCEDURE — 92973 PRQ TRLUML C MCHN ASP THRMBC: CPT | Performed by: INTERNAL MEDICINE

## 2025-04-28 PROCEDURE — 84484 ASSAY OF TROPONIN QUANT: CPT | Performed by: HOSPITALIST

## 2025-04-28 PROCEDURE — 2500000004 HC RX 250 GENERAL PHARMACY W/ HCPCS (ALT 636 FOR OP/ED): Mod: JZ | Performed by: INTERNAL MEDICINE

## 2025-04-28 PROCEDURE — 2500000004 HC RX 250 GENERAL PHARMACY W/ HCPCS (ALT 636 FOR OP/ED): Mod: JZ | Performed by: NURSE PRACTITIONER

## 2025-04-28 PROCEDURE — 36415 COLL VENOUS BLD VENIPUNCTURE: CPT | Performed by: HOSPITALIST

## 2025-04-28 PROCEDURE — 93010 ELECTROCARDIOGRAM REPORT: CPT | Performed by: INTERNAL MEDICINE

## 2025-04-28 PROCEDURE — 99233 SBSQ HOSP IP/OBS HIGH 50: CPT | Performed by: INTERNAL MEDICINE

## 2025-04-28 PROCEDURE — 2500000002 HC RX 250 W HCPCS SELF ADMINISTERED DRUGS (ALT 637 FOR MEDICARE OP, ALT 636 FOR OP/ED): Performed by: INTERNAL MEDICINE

## 2025-04-28 PROCEDURE — 92973 PRQ TRLUML C MCHN ASP THRMBC: CPT | Mod: RC | Performed by: INTERNAL MEDICINE

## 2025-04-28 PROCEDURE — 99152 MOD SED SAME PHYS/QHP 5/>YRS: CPT | Performed by: INTERNAL MEDICINE

## 2025-04-28 PROCEDURE — 92978 ENDOLUMINL IVUS OCT C 1ST: CPT | Mod: RC | Performed by: INTERNAL MEDICINE

## 2025-04-28 PROCEDURE — C1753 CATH, INTRAVAS ULTRASOUND: HCPCS | Performed by: INTERNAL MEDICINE

## 2025-04-28 PROCEDURE — 2500000004 HC RX 250 GENERAL PHARMACY W/ HCPCS (ALT 636 FOR OP/ED): Mod: JZ | Performed by: HOSPITALIST

## 2025-04-28 PROCEDURE — C1887 CATHETER, GUIDING: HCPCS | Performed by: INTERNAL MEDICINE

## 2025-04-28 PROCEDURE — 82947 ASSAY GLUCOSE BLOOD QUANT: CPT

## 2025-04-28 PROCEDURE — 2720000007 HC OR 272 NO HCPCS: Performed by: INTERNAL MEDICINE

## 2025-04-28 PROCEDURE — 2500000002 HC RX 250 W HCPCS SELF ADMINISTERED DRUGS (ALT 637 FOR MEDICARE OP, ALT 636 FOR OP/ED): Performed by: NURSE PRACTITIONER

## 2025-04-28 PROCEDURE — 83735 ASSAY OF MAGNESIUM: CPT | Performed by: HOSPITALIST

## 2025-04-28 PROCEDURE — 2500000002 HC RX 250 W HCPCS SELF ADMINISTERED DRUGS (ALT 637 FOR MEDICARE OP, ALT 636 FOR OP/ED): Performed by: HOSPITALIST

## 2025-04-28 PROCEDURE — C8929 TTE W OR WO FOL WCON,DOPPLER: HCPCS

## 2025-04-28 PROCEDURE — 86901 BLOOD TYPING SEROLOGIC RH(D): CPT | Performed by: HOSPITALIST

## 2025-04-28 PROCEDURE — B2111ZZ FLUOROSCOPY OF MULTIPLE CORONARY ARTERIES USING LOW OSMOLAR CONTRAST: ICD-10-PCS | Performed by: INTERNAL MEDICINE

## 2025-04-28 PROCEDURE — 85520 HEPARIN ASSAY: CPT | Performed by: EMERGENCY MEDICINE

## 2025-04-28 PROCEDURE — 99223 1ST HOSP IP/OBS HIGH 75: CPT | Performed by: INTERNAL MEDICINE

## 2025-04-28 PROCEDURE — 93458 L HRT ARTERY/VENTRICLE ANGIO: CPT | Performed by: INTERNAL MEDICINE

## 2025-04-28 PROCEDURE — 2500000001 HC RX 250 WO HCPCS SELF ADMINISTERED DRUGS (ALT 637 FOR MEDICARE OP): Performed by: INTERNAL MEDICINE

## 2025-04-28 PROCEDURE — 84132 ASSAY OF SERUM POTASSIUM: CPT | Performed by: NURSE PRACTITIONER

## 2025-04-28 PROCEDURE — 80048 BASIC METABOLIC PNL TOTAL CA: CPT | Performed by: NURSE PRACTITIONER

## 2025-04-28 PROCEDURE — 93005 ELECTROCARDIOGRAM TRACING: CPT

## 2025-04-28 PROCEDURE — 3E033PZ INTRODUCTION OF PLATELET INHIBITOR INTO PERIPHERAL VEIN, PERCUTANEOUS APPROACH: ICD-10-PCS | Performed by: INTERNAL MEDICINE

## 2025-04-28 PROCEDURE — 2500000001 HC RX 250 WO HCPCS SELF ADMINISTERED DRUGS (ALT 637 FOR MEDICARE OP): Performed by: HOSPITALIST

## 2025-04-28 PROCEDURE — 2500000001 HC RX 250 WO HCPCS SELF ADMINISTERED DRUGS (ALT 637 FOR MEDICARE OP): Performed by: NURSE PRACTITIONER

## 2025-04-28 PROCEDURE — 99222 1ST HOSP IP/OBS MODERATE 55: CPT | Performed by: INTERNAL MEDICINE

## 2025-04-28 PROCEDURE — C9606 PERC D-E COR REVASC W AMI S: HCPCS | Performed by: INTERNAL MEDICINE

## 2025-04-28 PROCEDURE — 92941 PRQ TRLML REVSC TOT OCCL AMI: CPT | Performed by: INTERNAL MEDICINE

## 2025-04-28 PROCEDURE — 4A023N7 MEASUREMENT OF CARDIAC SAMPLING AND PRESSURE, LEFT HEART, PERCUTANEOUS APPROACH: ICD-10-PCS | Performed by: INTERNAL MEDICINE

## 2025-04-28 PROCEDURE — 02713ZZ DILATION OF CORONARY ARTERY, TWO ARTERIES, PERCUTANEOUS APPROACH: ICD-10-PCS | Performed by: INTERNAL MEDICINE

## 2025-04-28 PROCEDURE — 85025 COMPLETE CBC W/AUTO DIFF WBC: CPT | Performed by: HOSPITALIST

## 2025-04-28 PROCEDURE — C1894 INTRO/SHEATH, NON-LASER: HCPCS | Performed by: INTERNAL MEDICINE

## 2025-04-28 PROCEDURE — 85347 COAGULATION TIME ACTIVATED: CPT

## 2025-04-28 PROCEDURE — 2060000001 HC INTERMEDIATE ICU ROOM DAILY

## 2025-04-28 PROCEDURE — 82374 ASSAY BLOOD CARBON DIOXIDE: CPT | Performed by: HOSPITALIST

## 2025-04-28 PROCEDURE — C1760 CLOSURE DEV, VASC: HCPCS | Performed by: INTERNAL MEDICINE

## 2025-04-28 PROCEDURE — RXMED WILLOW AMBULATORY MEDICATION CHARGE

## 2025-04-28 PROCEDURE — 99153 MOD SED SAME PHYS/QHP EA: CPT | Performed by: INTERNAL MEDICINE

## 2025-04-28 PROCEDURE — 02C03ZZ EXTIRPATION OF MATTER FROM CORONARY ARTERY, ONE ARTERY, PERCUTANEOUS APPROACH: ICD-10-PCS | Performed by: INTERNAL MEDICINE

## 2025-04-28 PROCEDURE — C1757 CATH, THROMBECTOMY/EMBOLECT: HCPCS | Performed by: INTERNAL MEDICINE

## 2025-04-28 PROCEDURE — 2780000003 HC OR 278 NO HCPCS: Performed by: INTERNAL MEDICINE

## 2025-04-28 PROCEDURE — 93306 TTE W/DOPPLER COMPLETE: CPT | Performed by: INTERNAL MEDICINE

## 2025-04-28 RX ORDER — LOSARTAN POTASSIUM 25 MG/1
12.5 TABLET ORAL DAILY
Status: DISCONTINUED | OUTPATIENT
Start: 2025-04-28 | End: 2025-04-30 | Stop reason: HOSPADM

## 2025-04-28 RX ORDER — HEPARIN SODIUM 1000 [USP'U]/ML
INJECTION, SOLUTION INTRAVENOUS; SUBCUTANEOUS AS NEEDED
Status: DISCONTINUED | OUTPATIENT
Start: 2025-04-28 | End: 2025-04-28 | Stop reason: HOSPADM

## 2025-04-28 RX ORDER — POTASSIUM CHLORIDE 20 MEQ/1
20 TABLET, EXTENDED RELEASE ORAL ONCE
Status: DISCONTINUED | OUTPATIENT
Start: 2025-04-28 | End: 2025-04-28

## 2025-04-28 RX ORDER — MIDAZOLAM HYDROCHLORIDE 1 MG/ML
INJECTION INTRAMUSCULAR; INTRAVENOUS AS NEEDED
Status: DISCONTINUED | OUTPATIENT
Start: 2025-04-28 | End: 2025-04-28 | Stop reason: HOSPADM

## 2025-04-28 RX ORDER — MAGNESIUM SULFATE HEPTAHYDRATE 40 MG/ML
2 INJECTION, SOLUTION INTRAVENOUS ONCE
Status: COMPLETED | OUTPATIENT
Start: 2025-04-28 | End: 2025-04-28

## 2025-04-28 RX ORDER — LIDOCAINE HYDROCHLORIDE 20 MG/ML
INJECTION, SOLUTION INFILTRATION; PERINEURAL AS NEEDED
Status: DISCONTINUED | OUTPATIENT
Start: 2025-04-28 | End: 2025-04-28 | Stop reason: HOSPADM

## 2025-04-28 RX ORDER — FENTANYL CITRATE 50 UG/ML
INJECTION, SOLUTION INTRAMUSCULAR; INTRAVENOUS AS NEEDED
Status: DISCONTINUED | OUTPATIENT
Start: 2025-04-28 | End: 2025-04-28 | Stop reason: HOSPADM

## 2025-04-28 RX ORDER — NITROGLYCERIN 40 MG/100ML
INJECTION INTRAVENOUS AS NEEDED
Status: DISCONTINUED | OUTPATIENT
Start: 2025-04-28 | End: 2025-04-28 | Stop reason: HOSPADM

## 2025-04-28 RX ORDER — EPTIFIBATIDE 0.75 MG/ML
2 INJECTION, SOLUTION INTRAVENOUS CONTINUOUS
Status: DISCONTINUED | OUTPATIENT
Start: 2025-04-28 | End: 2025-04-28

## 2025-04-28 RX ORDER — SPIRONOLACTONE 25 MG/1
25 TABLET ORAL DAILY
Status: DISCONTINUED | OUTPATIENT
Start: 2025-04-28 | End: 2025-04-30 | Stop reason: HOSPADM

## 2025-04-28 RX ORDER — ONDANSETRON HYDROCHLORIDE 2 MG/ML
INJECTION, SOLUTION INTRAVENOUS AS NEEDED
Status: DISCONTINUED | OUTPATIENT
Start: 2025-04-28 | End: 2025-04-28 | Stop reason: HOSPADM

## 2025-04-28 RX ORDER — POTASSIUM CHLORIDE 20 MEQ/1
40 TABLET, EXTENDED RELEASE ORAL ONCE
Status: COMPLETED | OUTPATIENT
Start: 2025-04-28 | End: 2025-04-28

## 2025-04-28 RX ORDER — POTASSIUM CHLORIDE 14.9 MG/ML
20 INJECTION INTRAVENOUS ONCE
Status: DISCONTINUED | OUTPATIENT
Start: 2025-04-28 | End: 2025-04-28

## 2025-04-28 RX ORDER — VERAPAMIL HYDROCHLORIDE 2.5 MG/ML
INJECTION INTRAVENOUS AS NEEDED
Status: DISCONTINUED | OUTPATIENT
Start: 2025-04-28 | End: 2025-04-28 | Stop reason: HOSPADM

## 2025-04-28 RX ORDER — SODIUM CHLORIDE 9 MG/ML
1.5 INJECTION, SOLUTION INTRAVENOUS CONTINUOUS
Status: ACTIVE | OUTPATIENT
Start: 2025-04-28 | End: 2025-04-28

## 2025-04-28 RX ORDER — PRASUGREL 10 MG/1
10 TABLET, FILM COATED ORAL DAILY
Status: DISCONTINUED | OUTPATIENT
Start: 2025-04-29 | End: 2025-04-30 | Stop reason: HOSPADM

## 2025-04-28 RX ORDER — EPTIFIBATIDE 0.75 MG/ML
INJECTION, SOLUTION INTRAVENOUS CONTINUOUS PRN
Status: COMPLETED | OUTPATIENT
Start: 2025-04-28 | End: 2025-04-28

## 2025-04-28 RX ORDER — INSULIN LISPRO 100 [IU]/ML
6 INJECTION, SOLUTION INTRAVENOUS; SUBCUTANEOUS ONCE
Status: COMPLETED | OUTPATIENT
Start: 2025-04-28 | End: 2025-04-28

## 2025-04-28 RX ORDER — PRASUGREL 10 MG/1
TABLET, FILM COATED ORAL AS NEEDED
Status: DISCONTINUED | OUTPATIENT
Start: 2025-04-28 | End: 2025-04-28 | Stop reason: HOSPADM

## 2025-04-28 RX ORDER — PRASUGREL 10 MG/1
10 TABLET, FILM COATED ORAL DAILY
Qty: 30 TABLET | Refills: 5 | Status: SHIPPED | OUTPATIENT
Start: 2025-04-29

## 2025-04-28 RX ORDER — MIDAZOLAM HYDROCHLORIDE 1 MG/ML
INJECTION, SOLUTION INTRAMUSCULAR; INTRAVENOUS AS NEEDED
Status: DISCONTINUED | OUTPATIENT
Start: 2025-04-28 | End: 2025-04-28 | Stop reason: HOSPADM

## 2025-04-28 RX ADMIN — SPIRONOLACTONE 25 MG: 25 TABLET ORAL at 14:52

## 2025-04-28 RX ADMIN — METOPROLOL TARTRATE 25 MG: 25 TABLET, FILM COATED ORAL at 20:33

## 2025-04-28 RX ADMIN — ASPIRIN 81 MG: 81 TABLET, COATED ORAL at 08:05

## 2025-04-28 RX ADMIN — POTASSIUM CHLORIDE 40 MEQ: 1500 TABLET, EXTENDED RELEASE ORAL at 09:54

## 2025-04-28 RX ADMIN — INSULIN LISPRO 6 UNITS: 100 INJECTION, SOLUTION INTRAVENOUS; SUBCUTANEOUS at 03:31

## 2025-04-28 RX ADMIN — SENNOSIDES 17.2 MG: 8.6 TABLET ORAL at 08:04

## 2025-04-28 RX ADMIN — POTASSIUM CHLORIDE 20 MEQ: 14.9 INJECTION, SOLUTION INTRAVENOUS at 07:55

## 2025-04-28 RX ADMIN — METOPROLOL TARTRATE 25 MG: 25 TABLET, FILM COATED ORAL at 08:05

## 2025-04-28 RX ADMIN — ATORVASTATIN CALCIUM 80 MG: 80 TABLET, FILM COATED ORAL at 20:33

## 2025-04-28 RX ADMIN — NITROGLYCERIN 0.4 MG: 0.4 TABLET SUBLINGUAL at 03:31

## 2025-04-28 RX ADMIN — LOSARTAN POTASSIUM 12.5 MG: 25 TABLET, FILM COATED ORAL at 14:52

## 2025-04-28 RX ADMIN — EPTIFIBATIDE 2 MCG/KG/MIN: 0.75 INJECTION INTRAVENOUS at 14:00

## 2025-04-28 RX ADMIN — SODIUM CHLORIDE 1.5 ML/KG/HR: 0.9 INJECTION, SOLUTION INTRAVENOUS at 14:00

## 2025-04-28 RX ADMIN — POTASSIUM CHLORIDE 40 MEQ: 1500 TABLET, EXTENDED RELEASE ORAL at 07:54

## 2025-04-28 RX ADMIN — PERFLUTREN 10 ML OF DILUTION: 6.52 INJECTION, SUSPENSION INTRAVENOUS at 09:11

## 2025-04-28 RX ADMIN — MAGNESIUM SULFATE HEPTAHYDRATE 2 G: 40 INJECTION, SOLUTION INTRAVENOUS at 10:01

## 2025-04-28 RX ADMIN — ACETAMINOPHEN 650 MG: 325 TABLET, FILM COATED ORAL at 16:11

## 2025-04-28 RX ADMIN — PANTOPRAZOLE SODIUM 20 MG: 20 TABLET, DELAYED RELEASE ORAL at 08:05

## 2025-04-28 RX ADMIN — SENNOSIDES 17.2 MG: 8.6 TABLET ORAL at 20:33

## 2025-04-28 SDOH — ECONOMIC STABILITY: HOUSING INSECURITY: IN THE LAST 12 MONTHS, WAS THERE A TIME WHEN YOU WERE NOT ABLE TO PAY THE MORTGAGE OR RENT ON TIME?: NO

## 2025-04-28 SDOH — HEALTH STABILITY: PHYSICAL HEALTH: ON AVERAGE, HOW MANY MINUTES DO YOU ENGAGE IN EXERCISE AT THIS LEVEL?: 0 MIN

## 2025-04-28 SDOH — ECONOMIC STABILITY: HOUSING INSECURITY: AT ANY TIME IN THE PAST 12 MONTHS, WERE YOU HOMELESS OR LIVING IN A SHELTER (INCLUDING NOW)?: NO

## 2025-04-28 SDOH — ECONOMIC STABILITY: TRANSPORTATION INSECURITY: IN THE PAST 12 MONTHS, HAS LACK OF TRANSPORTATION KEPT YOU FROM MEDICAL APPOINTMENTS OR FROM GETTING MEDICATIONS?: NO

## 2025-04-28 SDOH — HEALTH STABILITY: PHYSICAL HEALTH: ON AVERAGE, HOW MANY DAYS PER WEEK DO YOU ENGAGE IN MODERATE TO STRENUOUS EXERCISE (LIKE A BRISK WALK)?: 0 DAYS

## 2025-04-28 SDOH — ECONOMIC STABILITY: HOUSING INSECURITY: IN THE PAST 12 MONTHS, HOW MANY TIMES HAVE YOU MOVED WHERE YOU WERE LIVING?: 0

## 2025-04-28 SDOH — ECONOMIC STABILITY: FOOD INSECURITY: HOW HARD IS IT FOR YOU TO PAY FOR THE VERY BASICS LIKE FOOD, HOUSING, MEDICAL CARE, AND HEATING?: NOT HARD AT ALL

## 2025-04-28 ASSESSMENT — COGNITIVE AND FUNCTIONAL STATUS - GENERAL
DAILY ACTIVITIY SCORE: 24
MOBILITY SCORE: 24

## 2025-04-28 ASSESSMENT — ENCOUNTER SYMPTOMS
MUSCULOSKELETAL NEGATIVE: 1
HEMATOLOGIC/LYMPHATIC NEGATIVE: 1
CONSTITUTIONAL NEGATIVE: 1
EYES NEGATIVE: 1
NEUROLOGICAL NEGATIVE: 1
HEADACHES: 0
SHORTNESS OF BREATH: 0
ENDOCRINE COMMENTS: AS ABOVE
UNEXPECTED WEIGHT CHANGE: 0
NAUSEA: 1
ALLERGIC/IMMUNOLOGIC NEGATIVE: 1
PSYCHIATRIC NEGATIVE: 1
FREQUENCY: 1
SHORTNESS OF BREATH: 1

## 2025-04-28 ASSESSMENT — PAIN SCALES - GENERAL
PAINLEVEL_OUTOF10: 3
PAINLEVEL_OUTOF10: 0-NO PAIN
PAINLEVEL_OUTOF10: 4

## 2025-04-28 ASSESSMENT — ACTIVITIES OF DAILY LIVING (ADL)
LACK_OF_TRANSPORTATION: NO
LACK_OF_TRANSPORTATION: NO

## 2025-04-28 ASSESSMENT — PAIN DESCRIPTION - DESCRIPTORS: DESCRIPTORS: DULL;DISCOMFORT

## 2025-04-28 NOTE — POST-PROCEDURE NOTE
Physician Transition of Care Summary  Invasive Cardiovascular Lab    Procedure Date: 4/28/2025  Attending:    * Rakesh Vargas - Primary  Resident/Fellow/Other Assistant: Surgeons and Role:  * No surgeons found with a matching role *    Indications:   Pre-op Diagnosis      * NSTEMI (non-ST elevated myocardial infarction) (Multi) [I21.4]    Post-procedure diagnosis:   Post-op Diagnosis     * NSTEMI (non-ST elevated myocardial infarction) (Multi) [I21.4]    Procedure(s):   [facility], With LV  [id number] - [place] CATH PLMT L HRT & ARTS W/NJX & ANGIO IMG S&I        Procedure Findings:   Right Radial 6 Fr -- TR band  Severe 2 vessel and branch vessel CAD in a right dominant system.  Successful OCT guided PCI of the RPDA with mechanical thrombectomy  Patent mid RCA stent  Integrilin bolus x 2 --> please continue Integrilin gtt for 6 more hours  Loaded with ASA and Prasugrel      Description of the Procedure:   As above    Complications:   NA    Stents/Implants:   Implants       No implant documentation for this case.            Anticoagulation/Antiplatelet Plan:   ASA/Prasugrel for 1 year without interruption followed by lifelong ASA    Estimated Blood Loss:   20 mL    Anesthesia: Moderate Sedation Anesthesia Staff: No anesthesia staff entered.    Any Specimen(s) Removed:   Order Name Source Comment Collection Info Order Time   BASIC METABOLIC PANEL Blood, Venous  Collected By: Radha Wheatley RN 4/28/2025 11:18 AM     Release result to [employer]   Immediate            Disposition:   SDU      Electronically signed by: Saul Tavera MD, 4/28/2025 1:23 PM

## 2025-04-28 NOTE — ASSESSMENT & PLAN NOTE
- pt with hx of STEMI RCA, s/p PCI for total occlusion 10/16/23, has been non-compliant with all medications, currently not taking any, including ASA.   - smokes 1PPD  - Apparently was diagnosed with DM2 in 2023, never placed on medications.   - continue heparin  gtt  - ASA  - statin ordered  - Metoprolol   - heparin gtt started in ED  - nitropaste  - nitroglycerin SL or morphine prn  - NPO after midnight for LHC  - continue to monitor troponin  - cardiology consult  - echocardiogram ordered to evaluate for CHF, gallop heard on exam    DM2  - blood glu today 335  - Apparently he was diagnosed by Dr Baker in 10/23 with new onset DM2, but preferred to control it with diet and exercise. He was supposed to follow up with PCP regarding this, but has not seen any MD since this visit  - pt reports polyuria, polydipsia. Wife reports recent weight loss, in the past 2 months  - start insulin SS  - Consult endocrinology   - diabetic education  - nutrition consult    Tobacco use  - needs to stop smoking, he is aware of this.     Code status  - FULL

## 2025-04-28 NOTE — CARE PLAN
Cardiac catheterization images reviewed    Severe three-vessel disease noted including mid 70 to 80% LAD, LCx and ramus disease and thrombotic distal RCA into PDA.  PDA felt to be culprit lesion.  After discussion with Dr. Vargas decided to proceed with thrombectomy and possible stenting.  With aspiration thrombectomy lesion completely resolved and OCT showed no heavy atherosclerotic burden in the culprit area.  Due to concern regarding compliance with medications and good thrombectomy result we decided to stop there and treat with medical therapy for now.  Depending on how he progresses likely pursue outpatient CABG evaluation and will have further discussions with him regarding this.  He was loaded with prasugrel and we will continue with DAPT with aspirin and prasugrel for now.  Need to start goal-directed medical therapy for severe LV dysfunction.

## 2025-04-28 NOTE — CONSULTS
"Inpatient consult to Endocrinology  Consult performed by: Alexandro Maurer MD  Consult ordered by: Cindy Humphreys MD        Reason For Consult  Diabetes    History Of Present Illness  Francisco May is a 45 y.o. male     Duration of type 2 diabetes mellitus:  1.5 years  Complications:  cardiovascular disease    Insulin lispro 6 units subcutaneous at 03:31    Patient diagnosed with diabetes after coronary stent 1.5 year ago but does not acknowledge that diagnosis.     Persistent substernal chest pain  Denies shortness of breath  Frequent urination  Denies weight change  Denies vision change    Medications  Current Medications[1]     Past Medical History  He has a past medical history of Deep vein thrombosis (DVT) of proximal lower extremity (Multi) (10/26/2023), Diabetes mellitus type II, non insulin dependent (Multi) (10/26/2023), and STEMI involving right coronary artery (Multi) (10/26/2023).    Surgical History  He has a past surgical history that includes Cardiac catheterization (N/A, 10/15/2023) and Cardiac catheterization (N/A, 10/15/2023).     Social History  He reports that he has quit smoking. His smoking use included cigarettes. He has a 12 pack-year smoking history. He does not have any smokeless tobacco history on file. He reports that he does not currently use alcohol. He reports that he does not use drugs.    Family History  Family History[2]    Allergies  Amoxicillin    Review of Systems   Constitutional:  Negative for unexpected weight change.   Eyes:  Negative for visual disturbance.   Respiratory:  Negative for shortness of breath.    Cardiovascular:  Positive for chest pain.   Endocrine:        As above   Genitourinary:  Positive for frequency.   Neurological:  Negative for headaches.         Last Recorded Vitals  Blood pressure 110/71, pulse 89, temperature 36.7 °C (98 °F), temperature source Oral, resp. rate 18, height 1.676 m (5' 6\"), weight 111 kg (245 lb), SpO2 96%.    Physical " Exam  Constitutional:       General: He is not in acute distress.  HENT:      Head: Normocephalic.   Eyes:      Extraocular Movements: Extraocular movements intact.   Neck:      Thyroid: No thyromegaly.   Cardiovascular:      Pulses:           Radial pulses are 2+ on the right side and 2+ on the left side.   Abdominal:      Tenderness: There is no abdominal tenderness.   Musculoskeletal:      Right lower leg: No edema.      Left lower leg: No edema.   Neurological:      Mental Status: He is alert.      Motor: No tremor.   Psychiatric:         Mood and Affect: Affect is flat.          Relevant Results  Lab Results   Component Value Date    POCGLU 268 (H) 04/28/2025    POCGLU 293 (H) 04/28/2025    POCGLU 319 (H) 04/28/2025    GLUCOSE 223 (H) 04/28/2025    GLUCOSE 335 (H) 04/27/2025    GLUCOSE 176 (H) 10/16/2023    GLUCOSE 208 (H) 10/15/2023      Latest Reference Range & Units 04/27/25 19:40 04/28/25 05:59   GLUCOSE 74 - 99 mg/dL 335 (H) 223 (H)   SODIUM 136 - 145 mmol/L 132 (L) 138   POTASSIUM 3.5 - 5.3 mmol/L 4.3 2.9 (LL)   CHLORIDE 98 - 107 mmol/L 99 114 (H)   Bicarbonate 21 - 32 mmol/L 26 20 (L)   Anion Gap 10 - 20 mmol/L 11 7 (L)   Blood Urea Nitrogen 6 - 23 mg/dL 9 8   Creatinine 0.50 - 1.30 mg/dL 0.82 0.51   EGFR >60 mL/min/1.73m*2 >90 >90   Calcium 8.6 - 10.3 mg/dL 9.5 6.4 (L)         IMPRESSION  TYPE 2 DIABETES MELLITUS WITH HYPERGLYCEMIA   Complicated by CAD  A1c pending  Frequent urination, no other symptoms of uncontrolled hyperglycemia  Patient does not acknowledge prior diagnosis of diabetes in 2023      RECOMMENDATIONS  Continue lispro scale for now  Diabetes education  Anticipate need for diabetes medications with cardiac indication, pending cardiac workup        Alexandro Maurer MD         [1]   Current Facility-Administered Medications:     acetaminophen (Tylenol) tablet 650 mg, 650 mg, oral, q4h PRN, Cindy Humphreys MD    aspirin EC tablet 81 mg, 81 mg, oral, Daily, Cindy Humphreys MD    atorvastatin  (Lipitor) tablet 80 mg, 80 mg, oral, Nightly, Cindy Humphreys MD, 80 mg at 04/27/25 2244    dextrose 50 % injection 12.5 g, 12.5 g, intravenous, q15 min PRN, Cindy Humphreys MD    dextrose 50 % injection 25 g, 25 g, intravenous, q15 min PRN, Cindy Humphreys MD    glucagon (Glucagen) injection 1 mg, 1 mg, intramuscular, q15 min PRN, Cindy Humphreys MD    glucagon (Glucagen) injection 1 mg, 1 mg, intramuscular, q15 min PRN, Cindy Humphreys MD    heparin 25,000 Units in dextrose 5% 250 mL (100 Units/mL) infusion (premix), 0-4,000 Units/hr, intravenous, Continuous, Tone Guo MD, Last Rate: 14 mL/hr at 04/28/25 0554, 1,400 Units/hr at 04/28/25 0554    heparin bolus from bag 2,000-4,000 Units, 2,000-4,000 Units, intravenous, q4h PRN, Tone Guo MD    insulin lispro injection 0-10 Units, 0-10 Units, subcutaneous, q4h DIANE, Cindy Humphreys MD    melatonin tablet 3 mg, 3 mg, oral, Nightly PRN, Cindy Humphreys MD    metoprolol tartrate (Lopressor) tablet 25 mg, 25 mg, oral, BID, Cindy Humphreys MD, 25 mg at 04/27/25 2244    nitroglycerin (Nitrostat) SL tablet 0.4 mg, 0.4 mg, sublingual, q5 min PRN, Cindy Humphreys MD, 0.4 mg at 04/28/25 0331    ondansetron ODT (Zofran-ODT) disintegrating tablet 4 mg, 4 mg, oral, q8h PRN **OR** ondansetron (Zofran) injection 4 mg, 4 mg, intravenous, q8h PRN, Cindy Humphreys MD    pantoprazole (ProtoNix) EC tablet 20 mg, 20 mg, oral, Daily before breakfast, Cindy Humphreys MD    potassium chloride 20 mEq in sterile water for injection 100 mL, 20 mEq, intravenous, Once, Lily Rowley, APRN-CNP    potassium chloride CR (Klor-Con M20) ER tablet 20 mEq, 20 mEq, oral, Once, Lily BERNICE Rowley, APRN-CNP    potassium chloride CR (Klor-Con M20) ER tablet 40 mEq, 40 mEq, oral, Once, Lily BERNICE Rowley, APRN-CNP    sennosides (Senokot) tablet 17.2 mg, 2 tablet, oral, BID, Cindy Humphreys MD  [2]   Family History  Problem Relation Name Age of Onset    Heart attack Mother      Heart attack Father

## 2025-04-28 NOTE — PROGRESS NOTES
04/28/25 1614   [facility] Disability Status   Are you deaf or do you have serious difficulty hearing? N   Are you blind or do you have serious difficulty seeing, even when wearing glasses? N   Because of a physical, mental, or emotional condition, do you have serious difficulty concentrating, remembering, or making decisions? (5 years old or older) N   Do you have serious difficulty walking or climbing stairs? N   Do you have serious difficulty dressing or bathing? N   Because of a physical, mental, or emotional condition, do you have serious difficulty doing errands alone such as visiting the doctor? N

## 2025-04-28 NOTE — PROGRESS NOTES
Francisco May is a 45 y.o. male on day 1 of admission presenting with NSTEMI (non-ST elevated myocardial infarction) (Multi).      Subjective   Pt seen and examined.        Objective     Last Recorded Vitals  /64   Pulse 86   Temp 36.7 °C (98 °F) (Oral)   Resp 18   Wt 111 kg (245 lb)   SpO2 98%   Intake/Output last 3 Shifts:  No intake or output data in the 24 hours ending 04/28/25 1151    Admission Weight  Weight: 111 kg (245 lb) (04/27/25 1918)    Daily Weight  04/27/25 : 111 kg (245 lb)      Physical Exam  Constitutional: No acute distress, awake, alert  Head/Neck: Neck supple  Respiratory/Thorax: Lungs are Clear to auscultation  Cardiovascular: Regular, rate and rhythm,  2+ equal pulses of the extremities, normal S 1and S 2  Gastrointestinal: Nondistended, soft, non-tender, no rebound tenderness or guarding  Extremities: No edema. No calf tenderness.  Neurological: Awake and alert. No focal neurological deficits  Psychological: Appropriate mood and behavior    Relevant Results  Results for orders placed or performed during the hospital encounter of 04/27/25 (from the past 24 hours)   Electrocardiogram, 12-lead PRN ACS symptoms   Result Value Ref Range    Ventricular Rate 92 BPM    Atrial Rate 92 BPM    KS Interval 144 ms    QRS Duration 82 ms    QT Interval 366 ms    QTC Calculation(Bazett) 452 ms    P Axis 37 degrees    R Axis 18 degrees    T Axis 54 degrees    QRS Count 15 beats    Q Onset 216 ms    P Onset 144 ms    P Offset 196 ms    T Offset 399 ms    QTC Fredericia 422 ms   CBC and Auto Differential   Result Value Ref Range    WBC 16.0 (H) 4.4 - 11.3 x10*3/uL    nRBC 0.0 0.0 - 0.0 /100 WBCs    RBC 5.48 4.50 - 5.90 x10*6/uL    Hemoglobin 16.6 13.5 - 17.5 g/dL    Hematocrit 46.3 41.0 - 52.0 %    MCV 85 80 - 100 fL    MCH 30.3 26.0 - 34.0 pg    MCHC 35.9 32.0 - 36.0 g/dL    RDW 12.3 11.5 - 14.5 %    Platelets 231 150 - 450 x10*3/uL    Neutrophils % 70.5 40.0 - 80.0 %    Immature Granulocytes %,  Automated 0.4 0.0 - 0.9 %    Lymphocytes % 19.1 13.0 - 44.0 %    Monocytes % 9.0 2.0 - 10.0 %    Eosinophils % 0.7 0.0 - 6.0 %    Basophils % 0.3 0.0 - 2.0 %    Neutrophils Absolute 11.29 (H) 1.20 - 7.70 x10*3/uL    Immature Granulocytes Absolute, Automated 0.06 0.00 - 0.70 x10*3/uL    Lymphocytes Absolute 3.06 1.20 - 4.80 x10*3/uL    Monocytes Absolute 1.44 (H) 0.10 - 1.00 x10*3/uL    Eosinophils Absolute 0.11 0.00 - 0.70 x10*3/uL    Basophils Absolute 0.05 0.00 - 0.10 x10*3/uL   Comprehensive metabolic panel   Result Value Ref Range    Glucose 335 (H) 74 - 99 mg/dL    Sodium 132 (L) 136 - 145 mmol/L    Potassium 4.3 3.5 - 5.3 mmol/L    Chloride 99 98 - 107 mmol/L    Bicarbonate 26 21 - 32 mmol/L    Anion Gap 11 10 - 20 mmol/L    Urea Nitrogen 9 6 - 23 mg/dL    Creatinine 0.82 0.50 - 1.30 mg/dL    eGFR >90 >60 mL/min/1.73m*2    Calcium 9.5 8.6 - 10.3 mg/dL    Albumin 4.2 3.4 - 5.0 g/dL    Alkaline Phosphatase 85 33 - 120 U/L    Total Protein 7.2 6.4 - 8.2 g/dL    AST 81 (H) 9 - 39 U/L    Bilirubin, Total 1.0 0.0 - 1.2 mg/dL    ALT 23 10 - 52 U/L   Troponin I, High Sensitivity, Initial   Result Value Ref Range    Troponin I, High Sensitivity 12,213 (HH) 0 - 20 ng/L   Lipase   Result Value Ref Range    Lipase 142 (H) 9 - 82 U/L   Hemoglobin A1C   Result Value Ref Range    Hemoglobin A1C 11.3 (H) See comment %    Estimated Average Glucose 278 Not Established mg/dL   Troponin, High Sensitivity, 1 Hour   Result Value Ref Range    Troponin I, High Sensitivity 13,349 (HH) 0 - 20 ng/L   aPTT - baseline   Result Value Ref Range    aPTT 26 26 - 36 seconds   Protime-INR   Result Value Ref Range    Protime 12.2 9.8 - 12.4 seconds    INR 1.1 0.9 - 1.1   ECG 12 lead   Result Value Ref Range    Ventricular Rate 79 BPM    Atrial Rate 79 BPM    OK Interval 138 ms    QRS Duration 84 ms    QT Interval 398 ms    QTC Calculation(Bazett) 456 ms    P Axis 31 degrees    R Axis 41 degrees    T Axis 59 degrees    QRS Count 13 beats    Q  Onset 216 ms    P Onset 147 ms    P Offset 195 ms    T Offset 415 ms    QTC Fredericia 436 ms   Urinalysis with Reflex Culture and Microscopic   Result Value Ref Range    Color, Urine Light-Yellow Light-Yellow, Yellow, Dark-Yellow    Appearance, Urine Clear Clear    Specific Gravity, Urine 1.025 1.005 - 1.035    pH, Urine 5.0 5.0, 5.5, 6.0, 6.5, 7.0, 7.5, 8.0    Protein, Urine NEGATIVE NEGATIVE, 10 (TRACE), 20 (TRACE) mg/dL    Glucose, Urine OVER (4+) (A) Normal mg/dL    Blood, Urine NEGATIVE NEGATIVE mg/dL    Ketones, Urine 60 (2+) (A) NEGATIVE mg/dL    Bilirubin, Urine NEGATIVE NEGATIVE mg/dL    Urobilinogen, Urine Normal Normal mg/dL    Nitrite, Urine NEGATIVE NEGATIVE    Leukocyte Esterase, Urine 250 Yanique/uL (A) NEGATIVE   Microscopic Only, Urine   Result Value Ref Range    WBC, Urine 21-50 (A) 1-5, NONE /HPF    RBC, Urine NONE NONE, 1-2, 3-5 /HPF    Mucus, Urine FEW Reference range not established. /LPF   Troponin I, High Sensitivity   Result Value Ref Range    Troponin I, High Sensitivity 18,053 (HH) 0 - 20 ng/L   Heparin Assay, UFH   Result Value Ref Range    Heparin Unfractionated 0.1 See Comment Below for Therapeutic Ranges IU/mL   POCT GLUCOSE   Result Value Ref Range    POCT Glucose 319 (H) 74 - 99 mg/dL   Electrocardiogram, 12-lead PRN ACS symptoms   Result Value Ref Range    Ventricular Rate 79 BPM    Atrial Rate 79 BPM    ME Interval 138 ms    QRS Duration 92 ms    QT Interval 396 ms    QTC Calculation(Bazett) 454 ms    P Axis 14 degrees    R Axis 23 degrees    T Axis 37 degrees    QRS Count 13 beats    Q Onset 227 ms    P Onset 158 ms    P Offset 206 ms    T Offset 425 ms    QTC Fredericia 434 ms   POCT GLUCOSE   Result Value Ref Range    POCT Glucose 293 (H) 74 - 99 mg/dL   POCT GLUCOSE   Result Value Ref Range    POCT Glucose 268 (H) 74 - 99 mg/dL   Troponin I, High Sensitivity   Result Value Ref Range    Troponin I, High Sensitivity 16,117 (HH) 0 - 20 ng/L   Heparin Assay, UFH   Result Value Ref  Range    Heparin Unfractionated 0.1 See Comment Below for Therapeutic Ranges IU/mL   Basic metabolic panel   Result Value Ref Range    Glucose 223 (H) 74 - 99 mg/dL    Sodium 138 136 - 145 mmol/L    Potassium 2.9 (LL) 3.5 - 5.3 mmol/L    Chloride 114 (H) 98 - 107 mmol/L    Bicarbonate 20 (L) 21 - 32 mmol/L    Anion Gap 7 (L) 10 - 20 mmol/L    Urea Nitrogen 8 6 - 23 mg/dL    Creatinine 0.51 0.50 - 1.30 mg/dL    eGFR >90 >60 mL/min/1.73m*2    Calcium 6.4 (L) 8.6 - 10.3 mg/dL   CBC and Auto Differential   Result Value Ref Range    WBC 13.8 (H) 4.4 - 11.3 x10*3/uL    nRBC 0.0 0.0 - 0.0 /100 WBCs    RBC 5.14 4.50 - 5.90 x10*6/uL    Hemoglobin 15.4 13.5 - 17.5 g/dL    Hematocrit 44.2 41.0 - 52.0 %    MCV 86 80 - 100 fL    MCH 30.0 26.0 - 34.0 pg    MCHC 34.8 32.0 - 36.0 g/dL    RDW 12.5 11.5 - 14.5 %    Platelets 192 150 - 450 x10*3/uL    Neutrophils % 70.1 40.0 - 80.0 %    Immature Granulocytes %, Automated 0.4 0.0 - 0.9 %    Lymphocytes % 19.4 13.0 - 44.0 %    Monocytes % 8.9 2.0 - 10.0 %    Eosinophils % 0.9 0.0 - 6.0 %    Basophils % 0.3 0.0 - 2.0 %    Neutrophils Absolute 9.67 (H) 1.20 - 7.70 x10*3/uL    Immature Granulocytes Absolute, Automated 0.05 0.00 - 0.70 x10*3/uL    Lymphocytes Absolute 2.68 1.20 - 4.80 x10*3/uL    Monocytes Absolute 1.22 (H) 0.10 - 1.00 x10*3/uL    Eosinophils Absolute 0.12 0.00 - 0.70 x10*3/uL    Basophils Absolute 0.04 0.00 - 0.10 x10*3/uL   Magnesium   Result Value Ref Range    Magnesium 1.55 (L) 1.60 - 2.40 mg/dL   Type And Screen   Result Value Ref Range    ABO TYPE A     Rh TYPE NEG     ANTIBODY SCREEN NEG    Transthoracic Echo Complete   Result Value Ref Range    AV pk krystina 0.70 m/s    AV mn grad 1 mmHg    LVOT diam 2.00 cm    MV E/A ratio 1.39     LA vol index A/L 24.4 ml/m2    Tricuspid annular plane systolic excursion 1.9 cm    LV EF 28 %    RV free wall pk S' 10.99 cm/s    LVIDd 4.52 cm    Aortic Valve Area by Continuity of VTI 3.73 cm2    Aortic Valve Area by Continuity of Peak  Velocity 3.16 cm2    AV pk grad 2 mmHg    LV A4C EF 32.8     BSA 2.27 m2        Transthoracic Echo Complete   Final Result      XR chest 2 views   Final Result        No evidence of acute intrathoracic abnormality.        Signed by: Sav Vann 4/27/2025 8:08 PM   Dictation workstation:   [id number]      Cardiac Catheterization Procedure    (Results Pending)       Scheduled medications  Scheduled Medications[1]  Continuous medications  Continuous Medications[2]  PRN medications  PRN Medications[3]         Assessment/Plan                  Assessment & Plan  NSTEMI (non-ST elevated myocardial infarction) (Multi)    # NSTEMI  - pt with hx of STEMI RCA, s/p PCI for total occlusion 10/16/23, has been non-compliant with all medications, currently not taking any, including ASA.   - smokes 1PPD  - Apparently was diagnosed with DM2 in 2023, never placed on medications.   - continue heparin  gtt  - ASA  - statin ordered  - Metoprolol   - heparin gtt started in ED  - nitropaste  - nitroglycerin SL or morphine prn  - continue to monitor troponin  - cardiology consult  - echocardiogram ordered to evaluate for CHF  -[facility] today    DM2  - blood glu today 335  - Apparently he was diagnosed by Dr Baker in 10/23 with new onset DM2, but preferred to control it with diet and exercise. He was supposed to follow up with PCP regarding this, but has not seen any MD since this visit  - pt reports polyuria, polydipsia. Wife reports recent weight loss, in the past 2 months  - start insulin SS  - Consult endocrinology   - diabetic education  - nutrition consult    Tobacco use  - needs to stop smoking, he is aware of this.     Code status  - FULL        Chelsey Ayala MD           [1] aspirin, 81 mg, oral, Daily  atorvastatin, 80 mg, oral, Nightly  insulin lispro, 0-10 Units, subcutaneous, q4h DIANE  magnesium sulfate, 2 g, intravenous, Once  metoprolol tartrate, 25 mg, oral, BID  pantoprazole, 20 mg, oral, Daily before  breakfast  perflutren protein A microsphere, 0.5 mL, intravenous, Once in imaging  sennosides, 2 tablet, oral, BID  sulfur hexafluoride microsphr, 2 mL, intravenous, Once in imaging    [2] heparin, 0-4,000 Units/hr, Last Rate: 1,400 Units/hr (04/28/25 0511)    [3] PRN medications: acetaminophen, dextrose, dextrose, fentaNYL PF, glucagon, glucagon, heparin, heparin, lidocaine, melatonin, midazolam, nitroglycerin, ondansetron ODT **OR** ondansetron, ondansetron, verapamil

## 2025-04-28 NOTE — INTERVAL H&P NOTE
H&P reviewed. The patient was examined and there are no changes to the H&P.    ASA Classification: III  Mallampati Score: Class III    Sedation plan and risks discussed with:  patient      Here for [facility] today 4/28/25 with Dr. Vargas in the setting of NSTEMI. He reports 1/10 chest pain at this time.     Heparin gtt held in holding room pre-procedure.  BMP pre-procedure given hypokalemia s/p repletion (total 80 meq KCL PO, 20 meq KCL IV: infiltrated)  ASA 81 mg PO taken today prior to arrival. 324 mg ASA PO given 4/27/25.   Further mgmt per inpatient primary and consulting teams. Cardiology following.     Sara Scales APRN-CNP  Interventional Lab/Cardiology   [facility]

## 2025-04-28 NOTE — CONSULTS
"Inpatient consult to Cardiology  Consult performed by: Lily Rowley, APRN-CNP  Consult ordered by: Cindy Humphreys MD        Cards: Samuel 10/25/23    History Of Present Illness:    Francisco May is a 45 y.o. male with PMH of prior DVT/PE in 2019(provoked 6 months therapy), smoker, premature family history of CAD, STEMI 10/2023 with EMILY x1 to RCA who now presents to [facility] with chest pain.  Cardiology is consulted for \"chest pain\"    Patient says the chest pain started yesterday.  Claims it is in the center of his chest; said it started out of nowhere.  He also endorses dyspnea on exertion with the pain.  Claims it is like something \"stuck in his chest\".  Still complaining of chest pain 3 out of 10.  Prior to yesterday patient said he was doing good.  Although he is unable to afford his medications.  Says he has not been taking his prescribed medications including his metoprolol Lipitor and Brilinta since 1 to 2 months after his stent back in 2023.    Afebrile, heart rate 86, blood pressure 108/69, 98% on room air.  Notable labs on admission BUNs/CR 9/0.82, AST/ALT 81/23, lipase 142, high sensitive troponins 12,213, 13,349, 18,053, 16,117, WBC 13.8, H&H 15.4/44.2, urinalysis was positive for leukocytes, urine culture currently pending.  In the ER patient was given 324 mg of aspirin heparin bolus and heparin drip as well as 1 dose of nitroglycerin paste.    Of note last cardiology visit was in October 2023 where he elevated hemoglobin A1c was, follow-up with his primary care provider new diabetic diagnosis.  He was also referred to cardiac rehab at that time.  Has been lost to follow-up since.    Admit EKG 4/28/25 SR no acute signs of ischemia          Past Cardiology Tests (Last 3 Years):  CARDIAC CATH 10/15/23:  1. Inferior STEMI, now s/p PCI to mid RCA with 3.0 x 22 BRICE FRONTIER EMILY.   2. Right dominant coronary circulation, mid RCA culprit.   3. Moderate non-obstructive CAD elsewhere including distal left main " (40%), Prox-mid LAD (50%), ramus (50%).   4. Left Ventricular end-diastolic pressure = 38.     ECHO 10/15/23:  1. Left ventricular systolic function is low normal with a 50-55% estimated ejection fraction.   2. Basal and mid inferior wall is abnormal.      Past Medical History:  He has a past medical history of Deep vein thrombosis (DVT) of proximal lower extremity (Multi) (10/26/2023), Diabetes mellitus type II, non insulin dependent (Multi) (10/26/2023), and STEMI involving right coronary artery (Multi) (10/26/2023).    Past Surgical History:  He has a past surgical history that includes Cardiac catheterization (N/A, 10/15/2023) and Cardiac catheterization (N/A, 10/15/2023).      Social History:  He reports that he has quit smoking. His smoking use included cigarettes. He has a 12 pack-year smoking history. He does not have any smokeless tobacco history on file. He reports that he does not currently use alcohol. He reports that he does not use drugs.    Family History:  Family History[1]     Allergies:  Amoxicillin    ROS:  10 point review of systems including (Constitutional, Eyes, ENMT, Respiratory, Cardiac, Gastrointestinal, Neurological, Psychiatric, and Hematologic) was performed and is otherwise negative.    Objective Data:  Last Recorded Vitals:  Vitals:    25 2200 25 2215 25 0000 25 0400   BP:  145/82 107/71 108/69   BP Location:  Left arm Left arm Left arm   Patient Position:  Lying Lying Lying   Pulse: (!) 109  81 86   Resp:  17 17 18   Temp:  36.7 °C (98 °F) 36.8 °C (98.3 °F) 37.2 °C (99 °F)   TempSrc:  Oral Oral Oral   SpO2: 96% 97% 98%    Weight:       Height:         Medical Gas Therapy: None (Room air)  Weight  Av kg (245 lb)  Min: 111 kg (245 lb)  Max: 111 kg (245 lb)      LABS:  CMP:  Results from last 7 days   Lab Units 25  1940   SODIUM mmol/L 132*   POTASSIUM mmol/L 4.3   CHLORIDE mmol/L 99   CO2 mmol/L 26   ANION GAP mmol/L 11   BUN mg/dL 9   CREATININE  "mg/dL 0.82   EGFR mL/min/1.73m*2 >90   ALBUMIN g/dL 4.2   ALT U/L 23   AST U/L 81*   BILIRUBIN TOTAL mg/dL 1.0   LIPASE U/L 142*     CBC:  Results from last 7 days   Lab Units 04/28/25  0559 04/27/25  1940   WBC AUTO x10*3/uL 13.8* 16.0*   HEMOGLOBIN g/dL 15.4 16.6   HEMATOCRIT % 44.2 46.3   PLATELETS AUTO x10*3/uL 192 231   MCV fL 86 85     COAG:   Results from last 7 days   Lab Units 04/27/25 2044   INR  1.1     ABO: No results found for: \"ABO\"  HEME/ENDO:     CARDIAC:   Results from last 7 days   Lab Units 04/28/25  0508 04/28/25  0045 04/27/25 2042 04/27/25  1940   TROPHS ng/L 16,117* 18,053* 13,349* 12,213*             Last I/O:  No intake or output data in the 24 hours ending 04/28/25 0637  Net IO Since Admission: No IO data has been entered for this period [04/28/25 0637]      Imaging Results:  XR chest 2 views  Result Date: 4/27/2025  Interpreted By:  Sav Vann, STUDY: XR CHEST 2 VIEWS   INDICATION: Signs/Symptoms:CP.   COMPARISON: October 15, 2023     ACCESSION NUMBER(S): [id number]   ORDERING CLINICIAN: GABBIE VERONICA   FINDINGS: Mild cardiomegaly. No consolidation, effusion, edema, or pneumothorax.         No evidence of acute intrathoracic abnormality.   Signed by: Sav Vann 4/27/2025 8:08 PM Dictation workstation:   [id number]      Inpatient Medications:  Scheduled Medications[2]  PRN Medications[3]  Continuous Medications[4]    Outpatient Medications:  Current Outpatient Medications   Medication Instructions    atorvastatin (LIPITOR) 80 mg, oral, Nightly    metoprolol tartrate (LOPRESSOR) 25 mg, oral, 2 times daily    omeprazole (PriLOSEC) 20 mg DR capsule 1 capsule, Daily RT       Physical Exam:  General:  Patient is awake, alert, and oriented.  Patient is in no acute distress.  HEENT:  Pupils equal and reactive.  Normocephalic.  Moist mucosa.    Neck:  No thyromegaly.   Cardiovascular:  Regular rate and rhythm.  Normal S1 and S2.  Pulmonary:  Clear to auscultation bilaterally.  Abdomen:  " "Soft. Non-tender.   Non-distended.  Positive bowel sounds.  Lower Extremities:  2+ pedal pulses. No LE edema.  Neurologic:  Cranial nerves intact.  No focal deficit.   Skin: Skin warm and dry, normal skin turgor.   Psychiatric: Normal affect.     Assessment/Plan   Cards: Samuel 10/25/23    Francisco May is a 45 y.o. male with PMH of prior DVT/PE in 2019(provoked 6 months therapy), smoker, premature family history of CAD, STEMI 10/2023 with EMILY x1 to RCA who now presents to [facility] with chest pain.  Cardiology is consulted for \"chest pain\"    #NSTEMI r/o type I - HS troponin high sensitive troponins 12,213, 13,349, 18,053, 16,117, EKG non-ischemic, ongoing chest pain   #Hx of CAD s/p EMILY x1 stent to RCA -patient had not been compliant with meds, residual moderate atherosclerotic disease in LAD and ramus.  #DM2 - new dx, did not follow up with PCP   #Current tobacco use  #h/o DVT/PE - felt to be provoked. Completed 6 months therapy  #Hypokalemia this am 2.9- PO replacement ordered      RECS:  -We will obtain a transthoracic echocardiogram for structural evaluation including ejection fraction, assessment of regional wall motion abnormalities or valvular disease, and further evaluation of hemodynamics.    -c/w Heparin gtt  -No health insurance -> should see  -[facility] today   -Start Metoprolol 25mg BID, ASA 81mg daily, Atorvastatin 80mg daily       Code Status:  Full Code    I spent 40 minutes in the professional and overall care of this patient.        JIMI Hall-CNP     STAFF ADDENDUM:    Both the MEL and I have had a face to face encounter with the patient today. I have examined the patient and edited the documented physical examination as necessary.  I personally reviewed the patient's vital signs, telemetry, recent labs, medications, orders, EKGs, and pertinent cardiac imaging/ echocardiography.  I have reviewed the MEL's encounter note, approve the MEL's documentation and have edited the note to " reflect my diagnostic and therapeutic plan.      45-year-old male with history of inferior STEMI 10/2023 with EMILY to RCA and residual moderate LAD and LCx disease, DVT/PE 2019 felt to be provoked, current tobacco use, dyslipidemia, type 2 diabetes presents with chest pain.  He was last seen about 10 days after his acute MI in 2023 but neglected follow-up.  He has also not taken any medications in over a year.  Continues to smoke.  Yesterday with substernal chest pressure came in for further evaluation.  Troponin peak of over 18,000.  ECG without ischemic changes.  This morning he still has 3/10 chest discomfort.  Somewhat improved with nitroglycerin.  Troponin trending down.    Echocardiogram just completed showing LVEF 25-30% with global hypokinesis.  No significant valvular disease.    On exam he is in mild distress, irritated, alert and oriented x 3.  Heart regular rate and rhythm without murmur rub or gallop.  Lungs are clear bilaterally.    #NSTEMI  -EF now 25-30  - Suspect multivessel disease  #Diabetes type 2-untreated  #Dyslipidemia  #Current smoker  #Medication noncompliance  #Hypokalemia    Agree with plan as above including heparin, aspirin, statin, metoprolol  Left heart catheterization today.  If stenting planned would prefer load with prasugrel or clopidogrel rather than Brilinta due to concerns over medication and cost as reportedly does not have insurance.  Potassium is being repleted      Tonio Baker DO           [1]   Family History  Problem Relation Name Age of Onset    Heart attack Mother      Heart attack Father     [2]   Scheduled medications   Medication Dose Route Frequency    aspirin  81 mg oral Daily    atorvastatin  80 mg oral Nightly    insulin lispro  0-10 Units subcutaneous q4h DIANE    metoprolol tartrate  25 mg oral BID    pantoprazole  20 mg oral Daily before breakfast    sennosides  2 tablet oral BID   [3]   PRN medications   Medication    acetaminophen    dextrose    dextrose     glucagon    glucagon    heparin    melatonin    nitroglycerin    ondansetron ODT    Or    ondansetron   [4]   Continuous Medications   Medication Dose Last Rate    heparin  0-4,000 Units/hr 1,400 Units/hr (04/28/25 8354)

## 2025-04-28 NOTE — H&P
"History Of Present Illness  Francisco May is a 45 y.o. male with PMH of inferior STEMI 10/16/23, DVT/PE 2019 (provoked by occupation as ), presenting with chest pain.    Chest pain woke him from sleep today. Is located in the center of his chest with radiation straight through to the back. Pain described as \"like an air bubble is stuck in there\", is constant.   + SOB, diaphoresis.   Denies associated nausea.   Still having CP. Nitropaste ordered, about to be applied.   In the ED, ECG showed no ST elevation. Posterior ECG then performed (ECG timed 21:18) due to hx of RCA stent/occlusion, which showed no ST elevation.   Troponin 12,213 ->13,349  Heparin gtt started.     Mr May underwent LHC 10/16/23, PCI to RCA for 100% occlusion. Echo at that time showed LVEF 50-55%. Pt was discharged home on DAPT ASA/Brilinta, metoprolol 25mg BID and atorvastatin 80mg daily.   His HbA1c was 7.5 per the discharge summary, however, no mention made regarding DM.   He followed up with Dr Baker 10/25/23, at which time he was diagnosed with new onset DM, however, per that note he wanted to dry diet and exercise.     Mr May tells me that he did not take any of the prescribed medications, not even OTC baby aspirin since his LHC due to lack of medical insurance.   He smokes 1 PPD x 10 yrs.      Past Medical History  Medical History[1]    Surgical History  Surgical History[2]     Social History  He reports that he has quit smoking. His smoking use included cigarettes. He has a 12 pack-year smoking history. He does not have any smokeless tobacco history on file. He reports that he does not currently use alcohol. He reports that he does not use drugs.    Family History  Family History[3]     Allergies  Amoxicillin    Review of Systems   Constitutional: Negative.    HENT: Negative.     Eyes: Negative.    Respiratory:  Positive for shortness of breath.    Cardiovascular:  Positive for chest pain.   Gastrointestinal:  Positive for nausea. " "  Genitourinary: Negative.    Musculoskeletal: Negative.    Skin: Negative.    Allergic/Immunologic: Negative.    Neurological: Negative.    Hematological: Negative.    Psychiatric/Behavioral: Negative.          Physical Exam  Vitals reviewed.   Constitutional:       Appearance: Normal appearance.      Comments: Obese middle aged male, smells of cigarettes. He is in no distress. Wife at bedside.      HENT:      Head: Normocephalic and atraumatic.      Mouth/Throat:      Mouth: Mucous membranes are moist.   Eyes:      Extraocular Movements: Extraocular movements intact.      Conjunctiva/sclera: Conjunctivae normal.      Pupils: Pupils are equal, round, and reactive to light.   Cardiovascular:      Rate and Rhythm: Normal rate and regular rhythm.      Pulses: Normal pulses.      Heart sounds: Normal heart sounds.      Comments: Regular rate, gallop heard along left sternal border. No murmur heard.   Pulmonary:      Effort: Pulmonary effort is normal.      Breath sounds: Normal breath sounds.      Comments: Lungs clear. No crackles.   Abdominal:      General: Abdomen is flat. Bowel sounds are normal.      Palpations: Abdomen is soft.   Musculoskeletal:         General: Normal range of motion.      Comments: No LE edema.    Skin:     General: Skin is warm and dry.   Neurological:      General: No focal deficit present.      Mental Status: He is alert and oriented to person, place, and time.   Psychiatric:         Mood and Affect: Mood normal.         Behavior: Behavior normal.         Thought Content: Thought content normal.         Judgment: Judgment normal.          Last Recorded Vitals  Blood pressure 145/82, pulse (!) 109, temperature 36.7 °C (98 °F), temperature source Oral, resp. rate 17, height 1.676 m (5' 6\"), weight 111 kg (245 lb), SpO2 97%.    Relevant Results        Results for orders placed or performed during the hospital encounter of 04/27/25 (from the past 24 hours)   CBC and Auto Differential   Result " Value Ref Range    WBC 16.0 (H) 4.4 - 11.3 x10*3/uL    nRBC 0.0 0.0 - 0.0 /100 WBCs    RBC 5.48 4.50 - 5.90 x10*6/uL    Hemoglobin 16.6 13.5 - 17.5 g/dL    Hematocrit 46.3 41.0 - 52.0 %    MCV 85 80 - 100 fL    MCH 30.3 26.0 - 34.0 pg    MCHC 35.9 32.0 - 36.0 g/dL    RDW 12.3 11.5 - 14.5 %    Platelets 231 150 - 450 x10*3/uL    Neutrophils % 70.5 40.0 - 80.0 %    Immature Granulocytes %, Automated 0.4 0.0 - 0.9 %    Lymphocytes % 19.1 13.0 - 44.0 %    Monocytes % 9.0 2.0 - 10.0 %    Eosinophils % 0.7 0.0 - 6.0 %    Basophils % 0.3 0.0 - 2.0 %    Neutrophils Absolute 11.29 (H) 1.20 - 7.70 x10*3/uL    Immature Granulocytes Absolute, Automated 0.06 0.00 - 0.70 x10*3/uL    Lymphocytes Absolute 3.06 1.20 - 4.80 x10*3/uL    Monocytes Absolute 1.44 (H) 0.10 - 1.00 x10*3/uL    Eosinophils Absolute 0.11 0.00 - 0.70 x10*3/uL    Basophils Absolute 0.05 0.00 - 0.10 x10*3/uL   Comprehensive metabolic panel   Result Value Ref Range    Glucose 335 (H) 74 - 99 mg/dL    Sodium 132 (L) 136 - 145 mmol/L    Potassium 4.3 3.5 - 5.3 mmol/L    Chloride 99 98 - 107 mmol/L    Bicarbonate 26 21 - 32 mmol/L    Anion Gap 11 10 - 20 mmol/L    Urea Nitrogen 9 6 - 23 mg/dL    Creatinine 0.82 0.50 - 1.30 mg/dL    eGFR >90 >60 mL/min/1.73m*2    Calcium 9.5 8.6 - 10.3 mg/dL    Albumin 4.2 3.4 - 5.0 g/dL    Alkaline Phosphatase 85 33 - 120 U/L    Total Protein 7.2 6.4 - 8.2 g/dL    AST 81 (H) 9 - 39 U/L    Bilirubin, Total 1.0 0.0 - 1.2 mg/dL    ALT 23 10 - 52 U/L   Troponin I, High Sensitivity, Initial   Result Value Ref Range    Troponin I, High Sensitivity 12,213 (HH) 0 - 20 ng/L   Lipase   Result Value Ref Range    Lipase 142 (H) 9 - 82 U/L   Troponin, High Sensitivity, 1 Hour   Result Value Ref Range    Troponin I, High Sensitivity 13,349 (HH) 0 - 20 ng/L   aPTT - baseline   Result Value Ref Range    aPTT 26 26 - 36 seconds   Protime-INR   Result Value Ref Range    Protime 12.2 9.8 - 12.4 seconds    INR 1.1 0.9 - 1.1   Urinalysis with Reflex  Culture and Microscopic   Result Value Ref Range    Color, Urine Light-Yellow Light-Yellow, Yellow, Dark-Yellow    Appearance, Urine Clear Clear    Specific Gravity, Urine 1.025 1.005 - 1.035    pH, Urine 5.0 5.0, 5.5, 6.0, 6.5, 7.0, 7.5, 8.0    Protein, Urine NEGATIVE NEGATIVE, 10 (TRACE), 20 (TRACE) mg/dL    Glucose, Urine OVER (4+) (A) Normal mg/dL    Blood, Urine NEGATIVE NEGATIVE mg/dL    Ketones, Urine 60 (2+) (A) NEGATIVE mg/dL    Bilirubin, Urine NEGATIVE NEGATIVE mg/dL    Urobilinogen, Urine Normal Normal mg/dL    Nitrite, Urine NEGATIVE NEGATIVE    Leukocyte Esterase, Urine 250 Yanique/uL (A) NEGATIVE   Microscopic Only, Urine   Result Value Ref Range    WBC, Urine 21-50 (A) 1-5, NONE /HPF    RBC, Urine NONE NONE, 1-2, 3-5 /HPF    Mucus, Urine FEW Reference range not established. /LPF     XR chest 2 views  Result Date: 4/27/2025  Interpreted By:  Sav Vann, STUDY: XR CHEST 2 VIEWS   INDICATION: Signs/Symptoms:CP.   COMPARISON: October 15, 2023     ACCESSION NUMBER(S): [id number]   ORDERING CLINICIAN: GABBIE VERONICA   FINDINGS: Mild cardiomegaly. No consolidation, effusion, edema, or pneumothorax.         No evidence of acute intrathoracic abnormality.   Signed by: Sav Vann 4/27/2025 8:08 PM Dictation workstation:   [id number]      Assessment & Plan  NSTEMI (non-ST elevated myocardial infarction) (Multi)  - pt with hx of STEMI RCA, s/p PCI for total occlusion 10/16/23, has been non-compliant with all medications, currently not taking any, including ASA.   - smokes 1PPD  - Apparently was diagnosed with DM2 in 2023, never placed on medications.   - continue heparin  gtt  - ASA  - statin ordered  - Metoprolol   - heparin gtt started in ED  - nitropaste  - nitroglycerin SL or morphine prn  - NPO after midnight for LHC  - continue to monitor troponin  - cardiology consult  - echocardiogram ordered to evaluate for CHF, gallop heard on exam    DM2  - blood glu today 335  - Apparently he was diagnosed by   Samuel in 10/23 with new onset DM2, but preferred to control it with diet and exercise. He was supposed to follow up with PCP regarding this, but has not seen any MD since this visit  - pt reports polyuria, polydipsia. Wife reports recent weight loss, in the past 2 months  - start insulin SS  - Consult endocrinology   - diabetic education  - nutrition consult    Tobacco use  - needs to stop smoking, he is aware of this.     Code status  - FULL      I spent 60 minutes in the professional and overall care of this patient.      Cindy Humphreys MD         [1]   Past Medical History:  Diagnosis Date    Deep vein thrombosis (DVT) of proximal lower extremity (Multi) 10/26/2023    Diabetes mellitus type II, non insulin dependent (Multi) 10/26/2023    STEMI involving right coronary artery (Multi) 10/26/2023   [2]   Past Surgical History:  Procedure Laterality Date    CARDIAC CATHETERIZATION N/A 10/15/2023    Procedure: Left Heart Cath, No LV;  Surgeon: Juan Manuel Hall MD;  Location: [facility] Cardiac Cath Lab;  Service: Cardiovascular;  Laterality: N/A;    CARDIAC CATHETERIZATION N/A 10/15/2023    Procedure: PCI;  Surgeon: Juan Manuel Hall MD;  Location: [facility] Cardiac Cath Lab;  Service: Cardiovascular;  Laterality: N/A;   [3]   Family History  Problem Relation Name Age of Onset    Heart attack Mother      Heart attack Father

## 2025-04-28 NOTE — DISCHARGE INSTRUCTIONS

## 2025-04-28 NOTE — CONSULTS
Nutrition Consult Note  Nutrition Assessment      Reason for Assessment: Diet education, Admission nursing screening    Francisco May is a 45 y.o. year old male patient with NSTEMI (non-ST elevated myocardial infarction) (Multi) [I21.4]    referred for diet ed and MST-4 wt loss and poor appetite   .   Chart reviewed and pt visited.  Medical History[1]    Per chart review:  -Pt admitted c/o chest pains  -A1C 7.5; dx with T2DM in 2023    Pt in bathroom at time of visit. Left handouts with wife. Will discuss diet ed on follow up.    2nd attempt: pt and wife sleeping.    Scheduled medications  Scheduled Medications[2]  Continuous medications  Continuous Medications[3]  PRN medications  PRN Medications[4]    Nutrition Significant Labs:  BMP Trend:   Results from last 7 days   Lab Units 04/28/25  1501 04/28/25  1129 04/28/25  0559 04/27/25 1940   GLUCOSE mg/dL  --  287* 223* 335*   CALCIUM mg/dL  --  8.7 6.4* 9.5   SODIUM mmol/L  --  133* 138 132*   POTASSIUM mmol/L 4.4 4.5 2.9* 4.3   CO2 mmol/L  --  25 20* 26   CHLORIDE mmol/L  --  104 114* 99   BUN mg/dL  --  10 8 9   CREATININE mg/dL  --  0.75 0.51 0.82    , BG POCT trend:   Results from last 7 days   Lab Units 04/28/25  1503 04/28/25  0500 04/28/25  0336 04/28/25  0051   POCT GLUCOSE mg/dL 319* 268* 293* 319*    , Liver Function Trend:   Results from last 7 days   Lab Units 04/27/25 1940   ALK PHOS U/L 85   AST U/L 81*   ALT U/L 23   BILIRUBIN TOTAL mg/dL 1.0    , Renal Lab Trend:   Results from last 7 days   Lab Units 04/28/25  1501 04/28/25  1129 04/28/25  0559 04/27/25  1940   POTASSIUM mmol/L 4.4 4.5 2.9* 4.3   SODIUM mmol/L  --  133* 138 132*   MAGNESIUM mg/dL  --   --  1.55*  --    EGFR mL/min/1.73m*2  --  >90 >90 >90   BUN mg/dL  --  10 8 9   CREATININE mg/dL  --  0.75 0.51 0.82    , TPN/PPN Labs:   Results from last 7 days   Lab Units 04/28/25  1501 04/28/25  1129 04/28/25  0559 04/27/25  1940   GLUCOSE mg/dL  --  287* 223* 335*   POTASSIUM mmol/L 4.4 4.5 2.9*  "4.3   MAGNESIUM mg/dL  --   --  1.55*  --    SODIUM mmol/L  --  133* 138 132*   CHLORIDE mmol/L  --  104 114* 99   ALT U/L  --   --   --  23   AST U/L  --   --   --  81*   ALK PHOS U/L  --   --   --  85   BILIRUBIN TOTAL mg/dL  --   --   --  1.0    , Lipid Panel:   Lab Results   Component Value Date    CHOL 135 10/16/2023    HDL 22.8 10/16/2023    CHHDL 5.9 10/16/2023    VLDL 72 (H) 10/16/2023    TRIG 362 (H) 10/16/2023    , Vit D: No results found for: \"VITD25\" , Vit B12: No results found for: \"PNVTNPPN62\" , Iron Panel: No results found for: \"IRON\", \"TIBC\", \"FERRITIN\"     Dietary Orders (From admission, onward)       Start     Ordered    04/28/25 1327  Adult diet Cardiac, Consistent Carb; CCD 75 gm/meal; 70 gm fat; 2 - 3 grams Sodium  Diet effective now        Question Answer Comment   Diet type Cardiac    Diet type Consistent Carb    Carb diet selection: CCD 75 gm/meal    Fat restriction: 70 gm fat    Sodium restriction: 2 - 3 grams Sodium        04/28/25 1332    04/27/25 2241  May Participate in Room Service  ( ROOM SERVICE MAY PARTICIPATE)  Once        Question:  .  Answer:  Yes    04/27/25 2240                  History:  Food and Nutrient History: Unknown- pt unavailable at time of visit    Anthropometrics:  Height: 167.6 cm (5' 5.98\")  Weight: 111 kg (244 lb 11.4 oz)  BMI (Calculated): 39.52    Weight Change: -0.12    Significant Weight Loss: No       IBW/kg (Dietitian Calculated): 64.5 kg        Nutrition Focused Physical Findings:  Defer Subcutaneous Fat Loss Assessment: Defer all  Defer All Reason: Pt in bathroom    Defer Muscle Wasting Assessment: Defer all  Defer All Reason: Pt in bathroom    Edema: none       Skin: Negative       Education Documentation  Nutrition Related Education, taught by Meena Ureña RDN, JULIETA at 4/28/2025  5:04 PM.  Learner: Family, Patient  Readiness: Acceptance  Method: Handout  Response: No Evidence of Learning  Comment: Pt unavailable at time of visit. Will discuss diet " ed on f//u.    Nutrition Care Manual, taught by Meena Ureña RDN, JULIETA at 4/28/2025  5:04 PM.  Learner: Family, Patient  Readiness: Acceptance  Method: Handout  Response: No Evidence of Learning  Comment: Pt unavailable at time of visit. Will discuss diet ed on f//u.      Left handouts with wife.     Time Spent (min): 25 minutes  Last Date of Nutrition Visit: 04/28/25  Nutrition Follow-Up Needed?: Dietitian to reassess per policy  Follow up Comment: Diet ed + NATALIO- Arianna            [1]   Past Medical History:  Diagnosis Date    Deep vein thrombosis (DVT) of proximal lower extremity (Multi) 10/26/2023    Diabetes mellitus type II, non insulin dependent (Multi) 10/26/2023    STEMI involving right coronary artery (Multi) 10/26/2023   [2] aspirin, 81 mg, oral, Daily  atorvastatin, 80 mg, oral, Nightly  insulin lispro, 0-10 Units, subcutaneous, q4h DIANE  losartan, 12.5 mg, oral, Daily  metoprolol tartrate, 25 mg, oral, BID  oxygen, , inhalation, Continuous - 02/gases  pantoprazole, 20 mg, oral, Daily before breakfast  perflutren protein A microsphere, 0.5 mL, intravenous, Once in imaging  [START ON 4/29/2025] prasugrel, 10 mg, oral, Daily  sennosides, 2 tablet, oral, BID  spironolactone, 25 mg, oral, Daily  sulfur hexafluoride microsphr, 2 mL, intravenous, Once in imaging     [3] sodium chloride 0.9%, 1.5 mL/kg/hr, Last Rate: 1.5 mL/kg/hr (04/28/25 1400)     [4] PRN medications: acetaminophen, dextrose, dextrose, glucagon, glucagon, melatonin, nitroglycerin, ondansetron ODT **OR** ondansetron

## 2025-04-28 NOTE — CARE PLAN
The patient's goals for the shift include      The clinical goals for the shift include sleep      Problem: Pain - Adult  Goal: Verbalizes/displays adequate comfort level or baseline comfort level  4/28/2025 0203 by Charlie Tony RN  Outcome: Progressing  4/27/2025 2259 by Charlie Tony RN  Outcome: Progressing  4/27/2025 2259 by Charlie Tony RN  Outcome: Progressing     Problem: Safety - Adult  Goal: Free from fall injury  4/28/2025 0203 by Charlie Tony RN  Outcome: Progressing  4/27/2025 2259 by Charlie Tony RN  Outcome: Progressing  4/27/2025 2259 by Charlie Tony RN  Outcome: Progressing     Problem: Discharge Planning  Goal: Discharge to home or other facility with appropriate resources  4/28/2025 0203 by Charlie Tony RN  Outcome: Progressing  4/27/2025 2259 by Chralie Tony RN  Outcome: Progressing  4/27/2025 2259 by Charlie Tony RN  Outcome: Progressing     Problem: Chronic Conditions and Co-morbidities  Goal: Patient's chronic conditions and co-morbidity symptoms are monitored and maintained or improved  4/28/2025 0203 by Charlie Tony RN  Outcome: Progressing  4/27/2025 2259 by Charlie Tony RN  Outcome: Progressing  4/27/2025 2259 by Charlie Tony RN  Outcome: Progressing     Problem: Nutrition  Goal: Nutrient intake appropriate for maintaining nutritional needs  4/28/2025 0203 by Charlie Tony RN  Outcome: Progressing  4/27/2025 2259 by Charlie Tony RN  Outcome: Progressing  4/27/2025 2259 by Charlie Tony RN  Outcome: Progressing     Problem: Pain  Goal: Takes deep breaths with improved pain control throughout the shift  4/28/2025 0203 by Charlie Tony RN  Outcome: Progressing  4/27/2025 2259 by Charlie Tony RN  Outcome: Progressing  4/27/2025 2259 by Charlie Tony RN  Outcome: Progressing  Goal: Turns in bed with improved pain control throughout the shift  4/28/2025 0203 by Charlie Tony RN  Outcome: Progressing  4/27/2025 2259 by Charlie Tony RN  Outcome: Progressing  4/27/2025 2259 by Charlie Tony RN  Outcome: Progressing  Goal:  Walks with improved pain control throughout the shift  4/28/2025 0203 by Charlie Tony RN  Outcome: Progressing  4/27/2025 2259 by Charlie Tony RN  Outcome: Progressing  4/27/2025 2259 by Charlie Tony RN  Outcome: Progressing  Goal: Performs ADL's with improved pain control throughout shift  4/28/2025 0203 by Charlie Tony RN  Outcome: Progressing  4/27/2025 2259 by Charlie Tony RN  Outcome: Progressing  4/27/2025 2259 by Charlie Tony RN  Outcome: Progressing  Goal: Participates in PT with improved pain control throughout the shift  4/28/2025 0203 by Charlie Tony RN  Outcome: Progressing  4/27/2025 2259 by Charlie Tony RN  Outcome: Progressing  4/27/2025 2259 by Charlie Tony RN  Outcome: Progressing  Goal: Free from opioid side effects throughout the shift  4/28/2025 0203 by Charlie Tony RN  Outcome: Progressing  4/27/2025 2259 by Charlie Tony RN  Outcome: Progressing  4/27/2025 2259 by Charlie Tony RN  Outcome: Progressing  Goal: Free from acute confusion related to pain meds throughout the shift  4/28/2025 0203 by Charlie Tony RN  Outcome: Progressing  4/27/2025 2259 by Charlie Tony RN  Outcome: Progressing  4/27/2025 2259 by Charlie Tony RN  Outcome: Progressing

## 2025-04-28 NOTE — PROGRESS NOTES
04/28/25 1615   Discharge Planning   Living Arrangements Spouse/significant other   Support Systems Spouse/significant other   Assistance Needed Independent, drives, works   Type of Residence Private residence   Number of Stairs to Enter Residence 1   Number of Stairs Within Residence 12   Do you have animals or pets at home? No   Who is requesting discharge planning? Provider   Home or Post Acute Services None   Expected Discharge Disposition Home   Does the patient need discharge transport arranged? Yes   RoundTrip coordination needed? Yes   Has discharge transport been arranged? No  (Wife will transport home)   Financial Resource Strain   How hard is it for you to pay for the very basics like food, housing, medical care, and heating? Not hard   Housing Stability   In the last 12 months, was there a time when you were not able to pay the mortgage or rent on time? N   In the past 12 months, how many times have you moved where you were living? 0   At any time in the past 12 months, were you homeless or living in a shelter (including now)? N   Transportation Needs   In the past 12 months, has lack of transportation kept you from medical appointments or from getting medications? no   In the past 12 months, has lack of transportation kept you from meetings, work, or from getting things needed for daily living? No   Patient Choice   Provider Choice list and CMS website (https://medicare.gov/care-compare#search) for post-acute Quality and Resource Measure Data were provided and reviewed with: Patient   Patient / Family choosing to utilize agency / facility established prior to hospitalization No   Stroke Family Assessment   Stroke Family Assessment Needed No   Intensity of Service   Intensity of Service 0-30 min          Met with patient and explained my role as care coordinator. Patient lives in the house with his wife Jigna. He is independent with his care at home. He drives and works. Patient denies use of any  ambulatory devices. No oxygen in use at home, no HD. Patient doesn't have PCP and would like to find his own. Patient doesn't have health insurance. Sw is aware. Pharmacy he uses is [facility] in [place]. Patient had cardiac catheterization done and old stent was cleaned out. He is on Integrilin drip. Patient denies any needs going home.   Imaging Studies

## 2025-04-28 NOTE — H&P (VIEW-ONLY)
"Inpatient consult to Cardiology  Consult performed by: Lily Rowley, APRN-CNP  Consult ordered by: Cindy Humphreys MD        Cards: Samuel 10/25/23    History Of Present Illness:    Francisco May is a 45 y.o. male with PMH of prior DVT/PE in 2019(provoked 6 months therapy), smoker, premature family history of CAD, STEMI 10/2023 with EMILY x1 to RCA who now presents to [facility] with chest pain.  Cardiology is consulted for \"chest pain\"    Patient says the chest pain started yesterday.  Claims it is in the center of his chest; said it started out of nowhere.  He also endorses dyspnea on exertion with the pain.  Claims it is like something \"stuck in his chest\".  Still complaining of chest pain 3 out of 10.  Prior to yesterday patient said he was doing good.  Although he is unable to afford his medications.  Says he has not been taking his prescribed medications including his metoprolol Lipitor and Brilinta since 1 to 2 months after his stent back in 2023.    Afebrile, heart rate 86, blood pressure 108/69, 98% on room air.  Notable labs on admission BUNs/CR 9/0.82, AST/ALT 81/23, lipase 142, high sensitive troponins 12,213, 13,349, 18,053, 16,117, WBC 13.8, H&H 15.4/44.2, urinalysis was positive for leukocytes, urine culture currently pending.  In the ER patient was given 324 mg of aspirin heparin bolus and heparin drip as well as 1 dose of nitroglycerin paste.    Of note last cardiology visit was in October 2023 where he elevated hemoglobin A1c was, follow-up with his primary care provider new diabetic diagnosis.  He was also referred to cardiac rehab at that time.  Has been lost to follow-up since.    Admit EKG 4/28/25 SR no acute signs of ischemia          Past Cardiology Tests (Last 3 Years):  CARDIAC CATH 10/15/23:  1. Inferior STEMI, now s/p PCI to mid RCA with 3.0 x 22 BRICE FRONTIER EMILY.   2. Right dominant coronary circulation, mid RCA culprit.   3. Moderate non-obstructive CAD elsewhere including distal left main " (40%), Prox-mid LAD (50%), ramus (50%).   4. Left Ventricular end-diastolic pressure = 38.     ECHO 10/15/23:  1. Left ventricular systolic function is low normal with a 50-55% estimated ejection fraction.   2. Basal and mid inferior wall is abnormal.      Past Medical History:  He has a past medical history of Deep vein thrombosis (DVT) of proximal lower extremity (Multi) (10/26/2023), Diabetes mellitus type II, non insulin dependent (Multi) (10/26/2023), and STEMI involving right coronary artery (Multi) (10/26/2023).    Past Surgical History:  He has a past surgical history that includes Cardiac catheterization (N/A, 10/15/2023) and Cardiac catheterization (N/A, 10/15/2023).      Social History:  He reports that he has quit smoking. His smoking use included cigarettes. He has a 12 pack-year smoking history. He does not have any smokeless tobacco history on file. He reports that he does not currently use alcohol. He reports that he does not use drugs.    Family History:  Family History[1]     Allergies:  Amoxicillin    ROS:  10 point review of systems including (Constitutional, Eyes, ENMT, Respiratory, Cardiac, Gastrointestinal, Neurological, Psychiatric, and Hematologic) was performed and is otherwise negative.    Objective Data:  Last Recorded Vitals:  Vitals:    25 2200 25 2215 25 0000 25 0400   BP:  145/82 107/71 108/69   BP Location:  Left arm Left arm Left arm   Patient Position:  Lying Lying Lying   Pulse: (!) 109  81 86   Resp:  17 17 18   Temp:  36.7 °C (98 °F) 36.8 °C (98.3 °F) 37.2 °C (99 °F)   TempSrc:  Oral Oral Oral   SpO2: 96% 97% 98%    Weight:       Height:         Medical Gas Therapy: None (Room air)  Weight  Av kg (245 lb)  Min: 111 kg (245 lb)  Max: 111 kg (245 lb)      LABS:  CMP:  Results from last 7 days   Lab Units 25  1940   SODIUM mmol/L 132*   POTASSIUM mmol/L 4.3   CHLORIDE mmol/L 99   CO2 mmol/L 26   ANION GAP mmol/L 11   BUN mg/dL 9   CREATININE  "mg/dL 0.82   EGFR mL/min/1.73m*2 >90   ALBUMIN g/dL 4.2   ALT U/L 23   AST U/L 81*   BILIRUBIN TOTAL mg/dL 1.0   LIPASE U/L 142*     CBC:  Results from last 7 days   Lab Units 04/28/25  0559 04/27/25  1940   WBC AUTO x10*3/uL 13.8* 16.0*   HEMOGLOBIN g/dL 15.4 16.6   HEMATOCRIT % 44.2 46.3   PLATELETS AUTO x10*3/uL 192 231   MCV fL 86 85     COAG:   Results from last 7 days   Lab Units 04/27/25 2044   INR  1.1     ABO: No results found for: \"ABO\"  HEME/ENDO:     CARDIAC:   Results from last 7 days   Lab Units 04/28/25  0508 04/28/25  0045 04/27/25 2042 04/27/25  1940   TROPHS ng/L 16,117* 18,053* 13,349* 12,213*             Last I/O:  No intake or output data in the 24 hours ending 04/28/25 0637  Net IO Since Admission: No IO data has been entered for this period [04/28/25 0637]      Imaging Results:  XR chest 2 views  Result Date: 4/27/2025  Interpreted By:  Sav Vann, STUDY: XR CHEST 2 VIEWS   INDICATION: Signs/Symptoms:CP.   COMPARISON: October 15, 2023     ACCESSION NUMBER(S): [id number]   ORDERING CLINICIAN: GABBIE VERONICA   FINDINGS: Mild cardiomegaly. No consolidation, effusion, edema, or pneumothorax.         No evidence of acute intrathoracic abnormality.   Signed by: Sav Vann 4/27/2025 8:08 PM Dictation workstation:   [id number]      Inpatient Medications:  Scheduled Medications[2]  PRN Medications[3]  Continuous Medications[4]    Outpatient Medications:  Current Outpatient Medications   Medication Instructions    atorvastatin (LIPITOR) 80 mg, oral, Nightly    metoprolol tartrate (LOPRESSOR) 25 mg, oral, 2 times daily    omeprazole (PriLOSEC) 20 mg DR capsule 1 capsule, Daily RT       Physical Exam:  General:  Patient is awake, alert, and oriented.  Patient is in no acute distress.  HEENT:  Pupils equal and reactive.  Normocephalic.  Moist mucosa.    Neck:  No thyromegaly.   Cardiovascular:  Regular rate and rhythm.  Normal S1 and S2.  Pulmonary:  Clear to auscultation bilaterally.  Abdomen:  " "Soft. Non-tender.   Non-distended.  Positive bowel sounds.  Lower Extremities:  2+ pedal pulses. No LE edema.  Neurologic:  Cranial nerves intact.  No focal deficit.   Skin: Skin warm and dry, normal skin turgor.   Psychiatric: Normal affect.     Assessment/Plan   Cards: Samuel 10/25/23    Francisco May is a 45 y.o. male with PMH of prior DVT/PE in 2019(provoked 6 months therapy), smoker, premature family history of CAD, STEMI 10/2023 with EMILY x1 to RCA who now presents to [facility] with chest pain.  Cardiology is consulted for \"chest pain\"    #NSTEMI r/o type I - HS troponin high sensitive troponins 12,213, 13,349, 18,053, 16,117, EKG non-ischemic, ongoing chest pain   #Hx of CAD s/p EMILY x1 stent to RCA -patient had not been compliant with meds, residual moderate atherosclerotic disease in LAD and ramus.  #DM2 - new dx, did not follow up with PCP   #Current tobacco use  #h/o DVT/PE - felt to be provoked. Completed 6 months therapy  #Hypokalemia this am 2.9- PO replacement ordered      RECS:  -We will obtain a transthoracic echocardiogram for structural evaluation including ejection fraction, assessment of regional wall motion abnormalities or valvular disease, and further evaluation of hemodynamics.    -c/w Heparin gtt  -No health insurance -> should see  -[facility] today   -Start Metoprolol 25mg BID, ASA 81mg daily, Atorvastatin 80mg daily       Code Status:  Full Code    I spent 40 minutes in the professional and overall care of this patient.        JIMI Hall-CNP     STAFF ADDENDUM:    Both the MEL and I have had a face to face encounter with the patient today. I have examined the patient and edited the documented physical examination as necessary.  I personally reviewed the patient's vital signs, telemetry, recent labs, medications, orders, EKGs, and pertinent cardiac imaging/ echocardiography.  I have reviewed the MEL's encounter note, approve the MEL's documentation and have edited the note to " reflect my diagnostic and therapeutic plan.      45-year-old male with history of inferior STEMI 10/2023 with EMILY to RCA and residual moderate LAD and LCx disease, DVT/PE 2019 felt to be provoked, current tobacco use, dyslipidemia, type 2 diabetes presents with chest pain.  He was last seen about 10 days after his acute MI in 2023 but neglected follow-up.  He has also not taken any medications in over a year.  Continues to smoke.  Yesterday with substernal chest pressure came in for further evaluation.  Troponin peak of over 18,000.  ECG without ischemic changes.  This morning he still has 3/10 chest discomfort.  Somewhat improved with nitroglycerin.  Troponin trending down.    Echocardiogram just completed showing LVEF 25-30% with global hypokinesis.  No significant valvular disease.    On exam he is in mild distress, irritated, alert and oriented x 3.  Heart regular rate and rhythm without murmur rub or gallop.  Lungs are clear bilaterally.    #NSTEMI  -EF now 25-30  - Suspect multivessel disease  #Diabetes type 2-untreated  #Dyslipidemia  #Current smoker  #Medication noncompliance  #Hypokalemia    Agree with plan as above including heparin, aspirin, statin, metoprolol  Left heart catheterization today.  If stenting planned would prefer load with prasugrel or clopidogrel rather than Brilinta due to concerns over medication and cost as reportedly does not have insurance.  Potassium is being repleted      Tonio Baker DO           [1]   Family History  Problem Relation Name Age of Onset    Heart attack Mother      Heart attack Father     [2]   Scheduled medications   Medication Dose Route Frequency    aspirin  81 mg oral Daily    atorvastatin  80 mg oral Nightly    insulin lispro  0-10 Units subcutaneous q4h DIANE    metoprolol tartrate  25 mg oral BID    pantoprazole  20 mg oral Daily before breakfast    sennosides  2 tablet oral BID   [3]   PRN medications   Medication    acetaminophen    dextrose    dextrose     glucagon    glucagon    heparin    melatonin    nitroglycerin    ondansetron ODT    Or    ondansetron   [4]   Continuous Medications   Medication Dose Last Rate    heparin  0-4,000 Units/hr 1,400 Units/hr (04/28/25 4712)

## 2025-04-28 NOTE — PROGRESS NOTES
Pharmacy Medication History     Source of Information: PATIENT'S SPOUSE    Additional concerns with the patient's PTA list.     Notified Provider via Haiku : No    The following updates were made to the Prior to Admission medication list:     Medications ADDED:   N/A  Medications CHANGED:  N/A  Medications REMOVED:   N/A  Medications NOT TAKING:   N/A    Allergy reviewed : Yes    Meds 2 Beds : Yes    Outpatient pharmacy confirmed and updated in chart : Yes    Pharmacy name: [facility]    The list below reflectives the updated PTA list. Please review each medication in order reconciliation for additional clarification and justification.    Prior to Admission Medications   Prescriptions Last Dose      atorvastatin (Lipitor) 80 mg tablet Not Taking      Sig: Take 1 tablet (80 mg) by mouth once daily at bedtime.   Patient not taking: Reported on 4/27/2025   metoprolol tartrate (Lopressor) 25 mg tablet Not Taking      Sig: Take 1 tablet (25 mg) by mouth 2 times a day.   Patient not taking: Reported on 4/27/2025   omeprazole (PriLOSEC) 20 mg DR capsule Not Taking      Sig: Take 1 capsule (20 mg) by mouth once daily.   Patient not taking: Reported on 4/27/2025      Facility-Administered Medications: None       The list below reflectives the updated allergy list. Please review each documented allergy for additional clarification and justification.    Allergies   Allergen Reactions    Amoxicillin Rash     Per pt report; states last reaction was at 5 y.o          04/28/25 at 11:06 AM - Livia Gamboa

## 2025-04-28 NOTE — SIGNIFICANT EVENT
Patient s/p LHC in the setting of NSTEMI today.       LHC:  Right Radial 6 Fr -- TR band  Severe 2 vessel and branch vessel CAD in a right dominant system.  Successful OCT guided PCI of the RPDA with mechanical thrombectomy  Patent mid RCA stent  Integrilin bolus x 2 -->Integrilin gtt for 6 more hours  Loaded with ASA and Prasugrel      Plan/Recs:  -Continue ASA, Prasugrel 10 mg daily (starting tomorrow 4/29/25)   -TR band removal post protocol  -IVF post cath per protocol  -Statin  -Cardiology rehab order placed.   -Further mgmt per inpatient primary and consulting teams, Cardiology following.    Patient developed epistaxis while on SDU post LHC while Integrilin infusing. Discussed with Dr. Vargas and Dr. Baker. Given epistaxis, Integrilin discontinued. He received Integrilin gtt for about 3 hours post LHC.     Sara KRAUSE-CNP  Interventional Lab/Cardiology   [facility]

## 2025-04-29 ENCOUNTER — APPOINTMENT (OUTPATIENT)
Dept: RADIOLOGY | Facility: HOSPITAL | Age: 45
End: 2025-04-29

## 2025-04-29 LAB
ANION GAP SERPL CALC-SCNC: 13 MMOL/L (ref 10–20)
BACTERIA UR CULT: NORMAL
BUN SERPL-MCNC: 11 MG/DL (ref 6–23)
CALCIUM SERPL-MCNC: 8.8 MG/DL (ref 8.6–10.3)
CHLORIDE SERPL-SCNC: 103 MMOL/L (ref 98–107)
CO2 SERPL-SCNC: 19 MMOL/L (ref 21–32)
CREAT SERPL-MCNC: 0.65 MG/DL (ref 0.5–1.3)
EGFRCR SERPLBLD CKD-EPI 2021: >90 ML/MIN/1.73M*2
GLUCOSE BLD MANUAL STRIP-MCNC: 149 MG/DL (ref 74–99)
GLUCOSE BLD MANUAL STRIP-MCNC: 193 MG/DL (ref 74–99)
GLUCOSE BLD MANUAL STRIP-MCNC: 270 MG/DL (ref 74–99)
GLUCOSE BLD MANUAL STRIP-MCNC: 285 MG/DL (ref 74–99)
GLUCOSE BLD MANUAL STRIP-MCNC: 285 MG/DL (ref 74–99)
GLUCOSE BLD MANUAL STRIP-MCNC: 348 MG/DL (ref 74–99)
GLUCOSE SERPL-MCNC: 359 MG/DL (ref 74–99)
POTASSIUM SERPL-SCNC: 3.8 MMOL/L (ref 3.5–5.3)
SODIUM SERPL-SCNC: 131 MMOL/L (ref 136–145)

## 2025-04-29 PROCEDURE — 2500000002 HC RX 250 W HCPCS SELF ADMINISTERED DRUGS (ALT 637 FOR MEDICARE OP, ALT 636 FOR OP/ED): Performed by: NURSE PRACTITIONER

## 2025-04-29 PROCEDURE — 71045 X-RAY EXAM CHEST 1 VIEW: CPT

## 2025-04-29 PROCEDURE — 71045 X-RAY EXAM CHEST 1 VIEW: CPT | Performed by: STUDENT IN AN ORGANIZED HEALTH CARE EDUCATION/TRAINING PROGRAM

## 2025-04-29 PROCEDURE — 36415 COLL VENOUS BLD VENIPUNCTURE: CPT | Performed by: NURSE PRACTITIONER

## 2025-04-29 PROCEDURE — 82947 ASSAY GLUCOSE BLOOD QUANT: CPT

## 2025-04-29 PROCEDURE — 2500000002 HC RX 250 W HCPCS SELF ADMINISTERED DRUGS (ALT 637 FOR MEDICARE OP, ALT 636 FOR OP/ED): Performed by: INTERNAL MEDICINE

## 2025-04-29 PROCEDURE — 99232 SBSQ HOSP IP/OBS MODERATE 35: CPT | Performed by: NURSE PRACTITIONER

## 2025-04-29 PROCEDURE — 2500000001 HC RX 250 WO HCPCS SELF ADMINISTERED DRUGS (ALT 637 FOR MEDICARE OP): Performed by: INTERNAL MEDICINE

## 2025-04-29 PROCEDURE — 99232 SBSQ HOSP IP/OBS MODERATE 35: CPT | Performed by: INTERNAL MEDICINE

## 2025-04-29 PROCEDURE — 2060000001 HC INTERMEDIATE ICU ROOM DAILY

## 2025-04-29 PROCEDURE — 80048 BASIC METABOLIC PNL TOTAL CA: CPT | Performed by: NURSE PRACTITIONER

## 2025-04-29 PROCEDURE — 99231 SBSQ HOSP IP/OBS SF/LOW 25: CPT | Performed by: INTERNAL MEDICINE

## 2025-04-29 PROCEDURE — 2500000001 HC RX 250 WO HCPCS SELF ADMINISTERED DRUGS (ALT 637 FOR MEDICARE OP): Performed by: NURSE PRACTITIONER

## 2025-04-29 RX ORDER — INSULIN LISPRO 100 [IU]/ML
0-15 INJECTION, SOLUTION INTRAVENOUS; SUBCUTANEOUS
Status: DISCONTINUED | OUTPATIENT
Start: 2025-04-29 | End: 2025-04-30 | Stop reason: HOSPADM

## 2025-04-29 RX ORDER — DAPAGLIFLOZIN 10 MG/1
10 TABLET, FILM COATED ORAL DAILY
Status: DISCONTINUED | OUTPATIENT
Start: 2025-04-30 | End: 2025-04-30 | Stop reason: HOSPADM

## 2025-04-29 RX ORDER — METOPROLOL SUCCINATE 50 MG/1
50 TABLET, EXTENDED RELEASE ORAL DAILY
Status: DISCONTINUED | OUTPATIENT
Start: 2025-04-29 | End: 2025-04-30 | Stop reason: HOSPADM

## 2025-04-29 RX ADMIN — INSULIN HUMAN 10 UNITS: 100 INJECTION, SUSPENSION SUBCUTANEOUS at 20:44

## 2025-04-29 RX ADMIN — INSULIN LISPRO 12 UNITS: 100 INJECTION, SOLUTION INTRAVENOUS; SUBCUTANEOUS at 09:13

## 2025-04-29 RX ADMIN — EMPAGLIFLOZIN 10 MG: 10 TABLET, FILM COATED ORAL at 09:13

## 2025-04-29 RX ADMIN — SENNOSIDES 17.2 MG: 8.6 TABLET ORAL at 20:44

## 2025-04-29 RX ADMIN — ASPIRIN 81 MG: 81 TABLET, COATED ORAL at 09:13

## 2025-04-29 RX ADMIN — SPIRONOLACTONE 25 MG: 25 TABLET ORAL at 09:13

## 2025-04-29 RX ADMIN — INSULIN LISPRO 6 UNITS: 100 INJECTION, SOLUTION INTRAVENOUS; SUBCUTANEOUS at 04:38

## 2025-04-29 RX ADMIN — ATORVASTATIN CALCIUM 80 MG: 80 TABLET, FILM COATED ORAL at 20:44

## 2025-04-29 RX ADMIN — SENNOSIDES 17.2 MG: 8.6 TABLET ORAL at 09:12

## 2025-04-29 RX ADMIN — INSULIN HUMAN 10 UNITS: 100 INJECTION, SUSPENSION SUBCUTANEOUS at 09:15

## 2025-04-29 RX ADMIN — LOSARTAN POTASSIUM 12.5 MG: 25 TABLET, FILM COATED ORAL at 09:13

## 2025-04-29 RX ADMIN — INSULIN LISPRO 6 UNITS: 100 INJECTION, SOLUTION INTRAVENOUS; SUBCUTANEOUS at 00:12

## 2025-04-29 RX ADMIN — PANTOPRAZOLE SODIUM 20 MG: 20 TABLET, DELAYED RELEASE ORAL at 09:13

## 2025-04-29 RX ADMIN — INSULIN LISPRO 9 UNITS: 100 INJECTION, SOLUTION INTRAVENOUS; SUBCUTANEOUS at 17:18

## 2025-04-29 RX ADMIN — METOPROLOL SUCCINATE 50 MG: 50 TABLET, EXTENDED RELEASE ORAL at 09:13

## 2025-04-29 RX ADMIN — PRASUGREL 10 MG: 10 TABLET, FILM COATED ORAL at 09:12

## 2025-04-29 ASSESSMENT — COGNITIVE AND FUNCTIONAL STATUS - GENERAL
DAILY ACTIVITIY SCORE: 24
MOBILITY SCORE: 24

## 2025-04-29 ASSESSMENT — PAIN SCALES - GENERAL
PAINLEVEL_OUTOF10: 0 - NO PAIN

## 2025-04-29 ASSESSMENT — PAIN - FUNCTIONAL ASSESSMENT
PAIN_FUNCTIONAL_ASSESSMENT: 0-10
PAIN_FUNCTIONAL_ASSESSMENT: 0-10

## 2025-04-29 NOTE — PROGRESS NOTES
I spoke with this patient and his wife at bedside regarding medication education/financial navigation due to lack of insurance.    Wife more engaged in conversation than patient. She reports working diligently to try to secure insurance for patient through his employer. She is a strong support for the patient at this time in regards to how he will be able to stay on top of his medications and managing his health after discharge.    Discussed higher cost medications: insulin, diabetic supplies, Jardiance. Insulin is available via coupon for $35 per month regardless of insurance status which wife states will be manageable. Discussed cost of pen needles to administer and that [id number] would have best prices for testing supplies.     In regard to Jardiance, without insurance coverage, cost is too expensive per patient but wife is motivated to find a way to afford it. Discussed Farxiga as alternative as there is a 30-day voucher available and it will be going fully generic in October this year. Discussed the  cost assistance program, but the patient will not qualify based on reported income (for either Jardiance or Farxiga). Also advised patient to look into getting through [facility]. Per eRnee, the dapagliflozin at [facility] is currently ~$335/month (explained the generic currently available has exclusivity until October). If insurance coverage is secured, plans sometimes want to see patient on metformin prior to coverage (for HF indications, this step therapy requirement can usually be bypassed easily through prior authorization).    Provided patient with heart failure booklet as EF 25-30% on echo this admission. Explained signs and symptoms and importance of medications in managing the condition. Provided blood pressure cuff and will return with scale for home monitoring this afternoon.    Patient and wife have my work number if they have additional questions for me. Answered all current questions to the best of my  ability.    Made Kwan Renee, [facility], and Samuel aware of Farxiga switch request for better financial feasibility.    Andressa Be PharmD

## 2025-04-29 NOTE — PROGRESS NOTES
Francisco May is a 45 y.o. male on day 2 of admission presenting with NSTEMI (non-ST elevated myocardial infarction) (Multi).      Subjective   Patient was seen and examined bedside this morning, in company of spouse, stated that he experienced chest wall pain intermittently, and denies shortness of breath, headache, blurry vision or any other symptom at that time.  Patient was concerned about his hyperglycemia as well, stated that he has not been aware of diabetes diagnosis, was simply told some years back that he was prediabetic.  Patient has insurance coverage issues with medication, but agreeable to be under insulin therapy to improve his A1c.       Objective     Last Recorded Vitals  BP 94/63   Pulse 81   Temp 36.6 °C (97.9 °F) (Temporal)   Resp 18   Wt 111 kg (244 lb 11.4 oz)   SpO2 97%   Intake/Output last 3 Shifts:    Intake/Output Summary (Last 24 hours) at 4/29/2025 1622  Last data filed at 4/28/2025 1900  Gross per 24 hour   Intake --   Output 1050 ml   Net -1050 ml       Admission Weight  Weight: 111 kg (245 lb) (04/27/25 1918)    Daily Weight  04/28/25 : 111 kg (244 lb 11.4 oz)    Image Results  XR chest 1 view  Narrative: Interpreted By:  Carine Campos,   STUDY:  XR CHEST 1 VIEW; 4/29/2025 12:45 pm      INDICATION:  Signs/Symptoms:Pleuritic pain, post heart catheterization      COMPARISON:  Radiographs 04/27/2025      ACCESSION NUMBER(S):  [id number]      ORDERING CLINICIAN:  SHELBI DE OLIVEIRA      TECHNIQUE:  Single frontal view of the chest performed.      FINDINGS:  LINES AND DEVICES:  None.      LUNGS:  Shallow inspiration with vascular crowding. No focal consolidation,  pulmonary edema, pleural effusion or pneumothorax.      CARDIOMEDIASTINAL SILHOUETTE:  Stable mild right heart enlargement.      Impression: No acute radiographic abnormality.      MACRO  None      Signed by: Carine Campos 4/29/2025 1:00 PM  Dictation workstation:   [id number]      Physical Exam    Relevant  "Results               This patient currently has cardiac telemetry ordered; if you would like to modify or discontinue the telemetry order, click here to go to the orders activity to modify/discontinue the order.        Assessment & Plan  NSTEMI (non-ST elevated myocardial infarction) (Multi)    45 y.o. male with PMH of inferior STEMI 10/16/23, DVT/PE 2019 (provoked by occupation as ), presenting with chest pain that woke him from sleep. Is located in the center of his chest with radiation straight through to the back. Pain described as \"like an air bubble is stuck in there\", is constant.        NSTEMI  - h/o STEMI RCA, s/p PCI for total occlusion 10/16/23, has been non-compliant with all medications, currently not taking any, including ASA.   -Status post [facility] on 4/29 showing Severe 2 vessel and branch vessel CAD in a right dominant system.. Successful OCT guided PCI of the RPDA with mechanical thrombectomy  Patent mid RCA stent  - smokes 1PPD, cardiology discussed smoke cessation  - Apparently was diagnosed with DM2 in 2023, never placed on medications.  States not aware of diagnosis  - continue heparin  gtt  - ASA 81 mg daily, atorvastatin 80 mg daily  - Metoprolol XL 50 mg daily  - heparin gtt started in ED  - nitropaste  - nitroglycerin SL or morphine prn  - continue to monitor troponin  - cardiology consult: Impression and recommendation below  Severe three-vessel disease noted including mid 70 to 80% LAD, LCx and ramus disease and thrombotic distal RCA into PDA. PDA felt to be culprit lesion. After discussion with Dr. Vargas decided to proceed with thrombectomy and possible stenting. With aspiration thrombectomy lesion completely resolved and OCT showed no heavy atherosclerotic burden in the culprit area. Due to concern regarding compliance with medications and good thrombectomy result we decided to stop there and treat with medical therapy for now.   - TTE 4/28/2025 shows severely decreased left " ventricular function with EF of 25 to 30%, with global hypokinesis with left ventricular minor regional variation with elevated left atrial pressure.    DM2  - blood glu today 335  - Apparently he was diagnosed by Dr Baker in 10/23 with new onset DM2, but preferred to control it with diet and exercise. He was supposed to follow up with PCP regarding this, but has not seen any MD since this visit  - pt reports polyuria, polydipsia. Wife reports recent weight loss, in the past 2 months  - start insulin SS  - Consult endocrinology: Placed on NPH, working on affordability given lack of insurance  - diabetic education  - nutrition consult    Tobacco use  - needs to stop smoking, he is aware of this.     Diet  -Cardiac    DVT prophylaxis  -On dual antiplatelet aspirin and prasugrel    Disposition: Presenting with chest pain, found with NSTEMI, status post LHC showing severe vascular disease, need further management, discharge pending final recommendation by cardiology.      Slade Renee, DO

## 2025-04-29 NOTE — CONSULTS
"Reason For Consult  Uncontrolled T2DM; New onset (to patient)    History Of Present Illness  Francisco May is a 45 y.o. male presenting with chest pain.     Past Medical History  He has a past medical history of Deep vein thrombosis (DVT) of proximal lower extremity (Multi) (10/26/2023), Diabetes mellitus type II, non insulin dependent (Multi) (10/26/2023), and STEMI involving right coronary artery (Multi) (10/26/2023).    Surgical History  He has a past surgical history that includes Cardiac catheterization (N/A, 10/15/2023) and Cardiac catheterization (N/A, 10/15/2023).     Social History  He reports that he has quit smoking. His smoking use included cigarettes. He has a 12 pack-year smoking history. He does not have any smokeless tobacco history on file. He reports that he does not currently use alcohol. He reports that he does not use drugs.    Family History  Family History[1]     Allergies  Amoxicillin    Review of Systems  Chest pain has resolved  Denies other pain       Physical Exam  N/a     Last Recorded Vitals  Blood pressure 112/81, pulse 89, temperature 36.9 °C (98.4 °F), temperature source Temporal, resp. rate 18, height 1.676 m (5' 5.98\"), weight 111 kg (244 lb 11.4 oz), SpO2 96%.    Relevant Results  HbA1C 11.3%, 7.5% at initial diagnosis 10/2023  BG remains quite elevated this AM at 359mg/dl     Assessment/Plan   Mr. Francisco May is a 45 year old T2DM male with PMH DVT/PE (2019), current smoker, CAD, STEMI with PCI (2023). Presented to ED for C/P, SOB and diaphoresis on 4/27. He was seen in cath lab the following day were multi vessel disease was discovered, aspiration thrombectomy completed.     Pts. 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 Maurer likely to commence BID mixed insulin  -per wife, pt will not administer injections himself   -Jigna trying to arrange schedule to inject Francisco prior to her leaving for work and when he gets home   -encouraged pt accountability and to learn admin technique   -possibly agreeable to CGM however, cost could be prohibitive   -pt does not currently have insurance    Diet/Exercise  -very minimal physical activity noted  -exercise as directed by healthcare provider/cardiology  -poor sleep hygiene and dietary habits  -work schedule makes meal time and healthy snacking difficult   -typically only eats 1 large meal /day prior to work at 6pm   -home around 5am, does not eat before go to bed   -convenient/fast food as only option during work  -suggested packed lunches/snacks to prevent   -resources given for choosing healthy snacks  -discussed diabetic plate method  -50/25/25 with appropriate serving sizes  -pt reports he does not eat/like many vegetables  -discussed appropriate vegetables vs those to limit  -appropriate medications, limitations and substitutions      Plan  -home with wife when ready  -follow up with cardiology  -establish care with endocrinology   -provider list given  -insulin pen training/education  -CGM training if/when warranted  -dietary and medication compliance    Dejuan Cuevas, RN  Time spent on documentation and consult 60 minutes       [1]   Family History  Problem Relation  Name Age of Onset    Heart attack Mother      Heart attack Father

## 2025-04-29 NOTE — PROGRESS NOTES
"Nutrition Follow-up Note  Nutrition Assessment      Reason for Assessment: Diet education, Admission nursing screening    Francisco May is a 45 y.o. year old male patient with NSTEMI (non-ST elevated myocardial infarction) (Multi) [I21.4]    referred for diet ed and MST-3 wt loss .     Pt defers diet education at this time, does have handouts from yesterday. Will call if he has any questions.    History:  Energy Intake: Fair 50-75 %, Good > 75 %  Food and Nutrient History: Unknown- pt unavailable at time of visit    Anthropometrics:  Height: 167.6 cm (5' 5.98\")  Weight: 111 kg (244 lb 11.4 oz)  BMI (Calculated): 39.52    Weight Change: -0.12    Significant Weight Loss: No  Pt says lost around 65# but it was over the course of 1.5 years.     IBW/kg (Dietitian Calculated): 64.5 kg        Total Energy Estimated Needs in 24 hours (kCal): 1675 kCal  Energy Estimated Needs per kg Body Weight in 24 hours (kCal/kg): 2225 kCal/kg  Method for Estimating Needs: 15-20kcal/kg    Total Protein Estimated Needs in 24 Hours (g): 95 g  Protein Estimated Needs per kg Body Weight in 24 Hours (g/kg): 130 g/kg  Method for Estimating 24 Hour Protein Needs: 1.5-2.0g/kg (IBW)    Method for Estimating 24 Hour Fluid Needs: 1mL/kcal or MD recommendations       Nutrition Focused Physical Findings:  Defer Subcutaneous Fat Loss Assessment: Defer all  Defer All Reason: Pt in bathroom  Orbital Fat Pads: Well nourished (slightly bulging fat pads)  Buccal Fat Pads: Well nourished (full, rounded cheeks)    Defer Muscle Wasting Assessment: Defer all  Defer All Reason: Pt in bathroom  Temporalis: Well nourished (well-defined muscle)    Edema: none     Skin: Negative       Nutrition Diagnosis      Patient has Nutrition Diagnosis: Yes  Nutrition Diagnosis 1: Altered nutrition related to laboratory values  Diagnosis Status (1): New  Related to (1): T2DM  As Evidenced by (1): A1C 11.3       Nutrition Interventions/Recommendations        Food and/or Nutrient " Delivery Interventions  Meals and Snacks: General healthful diet, Mineral-modified diet, Fat-modified diet, Carbohydrate-modified diet     -Follow CHO controlled diet    Education Documentation  Nutrition Related Education, taught by Meena Ureña RDN, LD at 4/28/2025  5:04 PM.  Learner: Family, Patient  Readiness: Acceptance  Method: Handout  Response: No Evidence of Learning  Comment: Pt unavailable at time of visit. Will discuss diet ed on f//u.    Nutrition Care Manual, taught by Meena Ureña RDN, LD at 4/28/2025  5:04 PM.  Learner: Family, Patient  Readiness: Acceptance  Method: Handout  Response: No Evidence of Learning  Comment: Pt unavailable at time of visit. Will discuss diet ed on f//u.      Pt defers dietary education at this time Pt states that his sister is a diabetic and a cook and plans to provide care and guidance for pt pertaining to T2DM.       Nutrition Monitoring and Evaluation   Food and Nutrient Related History  Estimated Energy Intake: Energy intake greater or equal to 75% of estimated energy needs    Fluid Intake: Estimated fluid intake    Intake / Amount of food: Meets > 75% estimated energy needs, Consumes at least 75% or more of meals/snacks/supplements    Anthropometrics: Body Composition/Growth/Weight History  Body Weight: Body weight - Maintain stable weight    Body Weight Change: Body weight loss - Gradual weight loss    Biochemical Data, Medical Tests and Procedures  Electrolyte and Renal Panel: Other (Comment), Sodium  Criteria: As clinically indicated    Gastrointestinal Profile: Other (Comment), Aspartate aminotransferase (AST)  Criteria: As clinically indicated    Glucose/Endocrine Profile: Glucose within normal limits ( mg/dL), Hemoglobin A1c (HgbA1c)  Criteria: As clinically indicated      Time Spent (min): 20 minutes  Last Date of Nutrition Visit: 04/29/25  Nutrition Follow-Up Needed?: Dietitian to reassess per policy  Follow up Comment: f/u, Diet ed- Arianna

## 2025-04-29 NOTE — PROGRESS NOTES
"Francisco May is a 45 y.o. male on day 2 of admission presenting with NSTEMI (non-ST elevated myocardial infarction) (Multi).    Subjective   Cardiac cath yesterday with 3 vessel disease, thrombectomy of PDA lesion.     Patient says he does not know what happened yesterday, but was aware \"they were digging around\" in him.  Wife is able to discussed his 3-vessel disease and possible plan for CABG.    She states they were not aware he had diabetes in 2023 and he would have taken care of himself if they knew.    Records from cardiology appointment 10/25/23 and After Visit Summary indicate diabetes was discussed and patient \"wants to try diet and exercise.\"      Scheduled medications  Scheduled Medications[1]  Continuous medications  Continuous Medications[2]  PRN medications  PRN Medications[3]    Objective   Physical Exam  Constitutional:       General: He is not in acute distress.  Neurological:      Mental Status: He is alert.   Psychiatric:         Mood and Affect: Affect is flat.         Last Recorded Vitals  Blood pressure 105/68, pulse 80, temperature 37 °C (98.6 °F), temperature source Oral, resp. rate 18, height 1.676 m (5' 5.98\"), weight 111 kg (244 lb 11.4 oz), SpO2 95%.  Intake/Output last 3 Shifts:  I/O last 3 completed shifts:  In: - (0 mL/kg)   Out: 1060 (9.5 mL/kg) [Urine:1050 (0.3 mL/kg/hr); Blood:10]  Weight: 111 kg     Relevant Results  Results from last 7 days   Lab Units 04/29/25  0436 04/29/25  0007 04/28/25  2039 04/28/25  1503 04/28/25  1129 04/28/25  0559 04/28/25  0500 04/28/25  0051 04/27/25  1940   POCT GLUCOSE mg/dL 285* 285* 296* 319*  --   --  268*   < >  --    GLUCOSE mg/dL  --   --   --   --  287* 223*  --   --  335*    < > = values in this interval not displayed.      Latest Reference Range & Units 04/27/25 19:40   Hemoglobin A1C See comment % 11.3 (H)   Estimated Average Glucose Not Established mg/dL 278     Assessment & Plan  NSTEMI (non-ST elevated myocardial infarction) " (Multi)      IMPRESSION  TYPE 2 DIABETES MELLITUS WITH HYPERGLYCEMIA   Complicated by CAD  A1c reflects uncontrolled hyperglycemia  Patient does not acknowledge prior diagnosis of diabetes in 2023  No urinary symptoms  Glucose remains elevated with minimal insulin      RECOMMENDATIONS  NPH 10 units subcutaneous BID today  Increase lispro scale for now  He will need insulin at discharge, likely premixed insulin BID  Appreciate Diabetes education  Anticipate need for SGLT2-inhibitor, will defer to cardiac indication.      Alexandro Maurer MD         [1] aspirin, 81 mg, oral, Daily  atorvastatin, 80 mg, oral, Nightly  insulin lispro, 0-10 Units, subcutaneous, q4h DIANE  losartan, 12.5 mg, oral, Daily  metoprolol tartrate, 25 mg, oral, BID  oxygen, , inhalation, Continuous - 02/gases  pantoprazole, 20 mg, oral, Daily before breakfast  perflutren protein A microsphere, 0.5 mL, intravenous, Once in imaging  prasugrel, 10 mg, oral, Daily  sennosides, 2 tablet, oral, BID  spironolactone, 25 mg, oral, Daily  sulfur hexafluoride microsphr, 2 mL, intravenous, Once in imaging  [2]    [3] PRN medications: acetaminophen, dextrose, dextrose, glucagon, glucagon, melatonin, nitroglycerin, ondansetron ODT **OR** ondansetron

## 2025-04-29 NOTE — PROGRESS NOTES
Subjective Data:  No chest pain over night  Tolerating meds  Episode of epistaxis after cath procedure yesterday-> IV Integrilin was Dc'd  Post LHC yesterday      Objective Data:  Last Recorded Vitals:  Vitals:    25 1900 25 0100 25 0300 25 0819   BP: 120/69 129/75 105/68 104/65   BP Location: Left arm Left arm Left arm    Patient Position: Lying Lying Lying    Pulse:    89   Resp:    17   Temp: 36.6 °C (97.9 °F) 37.4 °C (99.3 °F) 37 °C (98.6 °F) 36.6 °C (97.9 °F)   TempSrc: Oral Oral Oral Temporal   SpO2: 97% 96% 95% 94%   Weight:       Height:         Medical Gas Therapy: None (Room air)  Medical Gas Delivery Method: Nasal Cannula via Capnography  Weight  Av kg (244 lb 13.7 oz)  Min: 111 kg (244 lb 11.4 oz)  Max: 111 kg (245 lb)    LABS:  CMP:  Results from last 7 days   Lab Units 25  0931 25  1501 25  1129 25  0559 250   SODIUM mmol/L 131*  --  133* 138 132*   POTASSIUM mmol/L 3.8 4.4 4.5 2.9* 4.3   CHLORIDE mmol/L 103  --  104 114* 99   CO2 mmol/L 19*  --  25 20* 26   ANION GAP mmol/L 13  --  9* 7* 11   BUN mg/dL 11  --  10 8 9   CREATININE mg/dL 0.65  --  0.75 0.51 0.82   EGFR mL/min/1.73m*2 >90  --  >90 >90 >90   MAGNESIUM mg/dL  --   --   --  1.55*  --    ALBUMIN g/dL  --   --   --   --  4.2   ALT U/L  --   --   --   --  23   AST U/L  --   --   --   --  81*   BILIRUBIN TOTAL mg/dL  --   --   --   --  1.0   LIPASE U/L  --   --   --   --  142*     CBC:  Results from last 7 days   Lab Units 25  0559 04/27/25  1940   WBC AUTO x10*3/uL 13.8* 16.0*   HEMOGLOBIN g/dL 15.4 16.6   HEMATOCRIT % 44.2 46.3   PLATELETS AUTO x10*3/uL 192 231   MCV fL 86 85     COAG:   Results from last 7 days   Lab Units 25   INR  1.1     ABO:   ABO TYPE   Date Value Ref Range Status   2025 A  Final     HEME/ENDO:  Results from last 7 days   Lab Units 25   HEMOGLOBIN A1C % 11.3*      CARDIAC:   Results from last 7 days   Lab Units  04/28/25  1501 04/28/25  0508 04/28/25  0045 04/27/25 2042 04/27/25  1940   TROPHS ng/L 14,468* 16,117* 18,053* 13,349* 12,213*      Results from last 7 days   Lab Units 04/27/25 1940   HEMOGLOBIN A1C % 11.3*        Last I/O:    Intake/Output Summary (Last 24 hours) at 4/29/2025 1119  Last data filed at 4/28/2025 1900  Gross per 24 hour   Intake --   Output 1060 ml   Net -1060 ml     Net IO Since Admission: -1,060 mL [04/29/25 1119]      Imaging Results:  Transthoracic Echo Complete  Result Date: 4/28/2025   Formerly named [facility], [address] [place] [address]              Tel [phone] and Fax [phone] TRANSTHORACIC ECHOCARDIOGRAM REPORT  Patient Name:       MEGAN Uriarte Physician:    [id number] Tonio Baker DO Study Date:         4/28/2025           Ordering Provider:    [id number] MAVIS RODRIGUEZ MRN/PID:            [id number]            Fellow: Accession#:         [id number]        Nurse: Date of Birth/Age:  1980 / 45      Sonographer:          Ambrosio hammond Gender assigned at                     Additional Staff: Birth: Height:             168.00 cm           Admit Date:           4/27/2025 Weight:             111.00 kg           Admission Status:     Inpatient -                                                               Routine BSA / BMI:          2.18 m2 / 39.33     Encounter#:           [id number]                     kg/m2 Blood Pressure:     110/71 mmHg         Department Location:  [facility] Non                                                               Invasive Study Type:    TRANSTHORACIC ECHO (TTE) COMPLETE Diagnosis/ICD: Non ST elevation (NSTEMI) myocardial infarction-I21.4 Indication:    Here for NSTEMI, Gallop heard on exam CPT Code:      Echo Complete w Full [id number] Patient History: Diabetes:          Yes  Pertinent History: Previous DVT. Acute MI (inferior wall), STEMI, NSTEMI. Study Detail: The following Echo studies were performed: 2D, M-Mode, Doppler and               color flow. Technically challenging study due to body habitus.               Definity used as a contrast agent for endocardial border               definition. Total contrast used for this procedure was 2 mL via IV               push.  PHYSICIAN INTERPRETATION: Left Ventricle: The left ventricular systolic function is severely decreased, with a visually estimated ejection fraction of 25-30%. There is global hypokinesis of the left ventricle with minor regional variations. The left ventricular cavity size is normal. There is mildly increased septal and normal posterior left ventricular wall thickness. Spectral Doppler shows a Grade II (pseudonormal pattern) of left ventricular diastolic filling with an elevated left atrial pressure. Left Atrium: The left atrial size is normal. Right Ventricle: The right ventricle is normal in size. There is normal right ventricular global systolic function. Right Atrium: The right atrium is normal in size. Aortic Valve: The aortic valve was not well visualized. The aortic valve dimensionless index is 1.19. There is no evidence of aortic valve regurgitation. The peak instantaneous gradient of the aortic valve is 2 mmHg. The mean gradient of the aortic valve is 1 mmHg. Mitral Valve: The mitral valve is normal in structure. There is trace mitral valve regurgitation. Tricuspid Valve: The tricuspid valve is structurally normal. There is trace tricuspid regurgitation. Pulmonic Valve: The pulmonic valve is structurally normal. There is no indication of pulmonic valve regurgitation. Pericardium: There is no pericardial effusion noted. Aorta: The aortic root is normal. In comparison to the previous echocardiogram(s): Compared with study dated 10/16/2023, left ventricular function is now severely decreased.  CONCLUSIONS:  1.  The left ventricular systolic function is severely decreased, with a visually estimated ejection fraction of 25-30%.  2. There is global hypokinesis of the left ventricle with minor regional variations.  3. Spectral Doppler shows a Grade II (pseudonormal pattern) of left ventricular diastolic filling with an elevated left atrial pressure.  4. There is normal right ventricular global systolic function.  5. Compared with study dated 10/16/2023, left ventricular function is now severely decreased. QUANTITATIVE DATA SUMMARY:  2D MEASUREMENTS:          Normal Ranges: Ao Root s:       3.54 cm IVSd:            1.05 cm  (0.6-1.1cm) LVPWd:           0.92 cm  (0.6-1.1cm) LVIDd:           4.52 cm  (3.9-5.9cm) LVIDs:           3.19 cm LV Mass Index:   70 g/m2 LVEDV Index:     69 ml/m2 LV % FS          29.5 %  LEFT ATRIUM:                  Normal Ranges: LA Vol A4C:        50.7 ml    (22+/-6mL/m2) LA Vol A2C:        54.9 ml LA Vol BP:         53.3 ml LA Vol Index A4C:  23.2ml/m2 LA Vol Index A2C:  25.1 ml/m2 LA Vol Index BP:   24.4 ml/m2 LA Area A4C:       17.1 cm2 LA Area A2C:       17.6 cm2 LA Major Axis A4C: 4.9 cm LA Major Axis A2C: 4.8 cm LA Volume Index:   24.4 ml/m2 LA Vol A4C:        48.4 ml LA Vol A2C:        51.2 ml LA Vol Index BSA:  22.8 ml/m2  RIGHT ATRIUM:                 Normal Ranges: RA Vol A4C:        42.3 ml    (8.3-19.5ml) RA Vol Index A4C:  19.4 ml/m2 RA Area A4C:       15.3 cm2 RA Major Axis A4C: 4.7 cm  AORTA MEASUREMENTS:         Normal Ranges: Ao Sinus, d:        3.50 cm (2.1-3.5cm) Ao STJ, d:          3.00 cm (1.7-3.4cm) Asc Ao, d:          3.00 cm (2.1-3.4cm)  LV SYSTOLIC FUNCTION:                      Normal Ranges: EF-A4C View:    33 % (>=55%) EF-A2C View:    10 % EF-Biplane:     19 % EF-Visual:      28 % LV EF Reported: 28 %  LV DIASTOLIC FUNCTION:             Normal Ranges: MV Peak E:             0.69 m/s    (0.7-1.2 m/s) MV Peak A:             0.50 m/s    (0.42-0.7 m/s) E/A Ratio:             1.39         (1.0-2.2) MV e'                  0.120 m/s   (>8.0) MV lateral e'          0.15 m/s MV medial e'           0.09 m/s MV A Dur:              108.47 msec E/e' Ratio:            5.77        (<8.0) PulmV Sys Boubacar:         34.71 cm/s PulmV Acevedo Boubacar:        52.83 cm/s PulmV S/D Boubacar:         0.66 PulmV A Revs Boubacar:      25.68 cm/s PulmV A Revs Dur:      135.11 msec  MITRAL VALVE:          Normal Ranges: MV DT:        206 msec (150-240msec)  AORTIC VALVE:                     Normal Ranges: AoV Vmax:                0.70 m/s (<=1.7m/s) AoV Peak P.9 mmHg (<20mmHg) AoV Mean P.0 mmHg (1.7-11.5mmHg) LVOT Max Boubacar:            0.70 m/s (<=1.1m/s) AoV VTI:                 10.88 cm (18-25cm) LVOT VTI:                12.91 cm LVOT Diameter:           2.00 cm  (1.8-2.4cm) AoV Area, VTI:           3.73 cm2 (2.5-5.5cm2) AoV Area,Vmax:           3.16 cm2 (2.5-4.5cm2) AoV Dimensionless Index: 1.19  RIGHT VENTRICLE: RV Basal 4.10 cm RV Mid   2.40 cm RV Major 7.8 cm TAPSE:   19.2 mm RV s'    0.11 m/s  TRICUSPID VALVE/RVSP:         Normal Ranges: Est. RA Pressure:     3 mmHg IVC Diam:             2.30 cm  PULMONIC VALVE:          Normal Ranges: PV Accel Time:  112 msec (>120ms) PV Max Boubacar:     0.8 m/s  (0.6-0.9m/s) PV Max P.3 mmHg  PULMONARY VEINS: PulmV A Revs Dur: 135.11 msec PulmV A Revs Boubacar: 25.68 cm/s PulmV Acevedo Boubacar:   52.83 cm/s PulmV S/D Boubacar:    0.66 PulmV Sys Boubacar:    34.71 cm/s  AORTA: Asc Ao Diam 3.00 cm  [id number] Tonio Samuel DO Electronically signed on 2025 at 9:47:49 AM  ** Final **     ECG 12 lead  Result Date: 2025  Normal sinus rhythm Possible Inferior infarct (cited on or before 16-OCT-2023) Abnormal ECG When compared with ECG of 2025 19:12, (unconfirmed) Nonspecific T wave abnormality, worse in Inferior leads See ED provider note for full interpretation and clinical correlation Confirmed by Ana Enriquez ([id number]) on 2025 10:43:18 AM    Electrocardiogram, 12-lead PRN  ACS symptoms  Result Date: 4/28/2025  Normal sinus rhythm Possible Inferior infarct (cited on or before 16-OCT-2023) Cannot rule out Anterior infarct , age undetermined Abnormal ECG When compared with ECG of 17-OCT-2023 10:33, T wave inversion no longer evident in Inferior leads See ED provider note for full interpretation and clinical correlation Confirmed by Ana Enriquez ([id number]) on 4/28/2025 10:42:28 AM    Electrocardiogram, 12-lead PRN ACS symptoms  Result Date: 4/28/2025  Normal sinus rhythm Possible Lateral infarct , age undetermined Abnormal ECG Confirmed by Perry Rose ([id number]) on 4/28/2025 9:25:00 AM    XR chest 2 views  Result Date: 4/27/2025  Interpreted By:  Sav Vann, STUDY: XR CHEST 2 VIEWS   INDICATION: Signs/Symptoms:CP.   COMPARISON: October 15, 2023     ACCESSION NUMBER(S): [id number]   ORDERING CLINICIAN: GABBIE VERONICA   FINDINGS: Mild cardiomegaly. No consolidation, effusion, edema, or pneumothorax.         No evidence of acute intrathoracic abnormality.   Signed by: Sav Vann 4/27/2025 8:08 PM Dictation workstation:   [id number]          Past Cardiology Tests (Last 3 Years):  EKG:  Results for orders placed during the hospital encounter of 04/27/25    Electrocardiogram, 12-lead PRN ACS symptoms    Narrative  Normal sinus rhythm  Possible Inferior infarct (cited on or before 16-OCT-2023)  Cannot rule out Anterior infarct , age undetermined  Abnormal ECG  When compared with ECG of 17-OCT-2023 10:33,  T wave inversion no longer evident in Inferior leads  See ED provider note for full interpretation and clinical correlation  Confirmed by Ana Enriquez ([id number]) on 4/28/2025 10:42:28 AM      Electrocardiogram, 12-lead PRN ACS symptoms    Narrative  Normal sinus rhythm  Possible Lateral infarct , age undetermined  Abnormal ECG  Confirmed by Perry Rose ([id number]) on 4/28/2025 9:25:00 AM      ECG 12 lead    Narrative  Normal sinus rhythm  Possible Inferior infarct (cited on or before  16-OCT-2023)  Abnormal ECG  When compared with ECG of 27-APR-2025 19:12, (unconfirmed)  Nonspecific T wave abnormality, worse in Inferior leads  See ED provider note for full interpretation and clinical correlation  Confirmed by Ana Enriquez ([id number]) on 4/28/2025 10:43:18 AM    Echo:  Results for orders placed during the hospital encounter of 04/27/25    Transthoracic Echo Complete    Narrative  [facility], [address] [place] [address]  Tel [phone] and Fax [phone]    TRANSTHORACIC ECHOCARDIOGRAM REPORT      Patient Name:       MEGAN CORTES           Reading Physician:    [id number] Tonio Baker DO  Study Date:         4/28/2025           Ordering Provider:    [id number] MAVIS RODRIGUEZ  MRN/PID:            [id number]            Fellow:  Accession#:         [id number]        Nurse:  Date of Birth/Age:  1980 / 45      Sonographer:          Ambrosio hammond  Gender assigned at                     Additional Staff:  Birth:  Height:             168.00 cm           Admit Date:           4/27/2025  Weight:             111.00 kg           Admission Status:     Inpatient -  Routine  BSA / BMI:          2.18 m2 / 39.33     Encounter#:           [id number]  kg/m2  Blood Pressure:     110/71 mmHg         Department Location:  [facility] Non  Invasive    Study Type:    TRANSTHORACIC ECHO (TTE) COMPLETE  Diagnosis/ICD: Non ST elevation (NSTEMI) myocardial infarction-I21.4  Indication:    Here for NSTEMI, Gallop heard on exam  CPT Code:      Echo Complete w Full Doppler-[id number]    Patient History:  Diabetes:          Yes  Pertinent History: Previous DVT. Acute MI (inferior wall), STEMI, NSTEMI.    Study Detail: The following Echo studies were performed: 2D, M-Mode, Doppler and  color flow. Technically challenging study due to body habitus.  Definity used as a contrast agent for endocardial border  definition. Total contrast used for this procedure was 2 mL via IV  push.      PHYSICIAN INTERPRETATION:  Left  Ventricle: The left ventricular systolic function is severely decreased, with a visually estimated ejection fraction of 25-30%. There is global hypokinesis of the left ventricle with minor regional variations. The left ventricular cavity size is normal. There is mildly increased septal and normal posterior left ventricular wall thickness. Spectral Doppler shows a Grade II (pseudonormal pattern) of left ventricular diastolic filling with an elevated left atrial pressure.  Left Atrium: The left atrial size is normal.  Right Ventricle: The right ventricle is normal in size. There is normal right ventricular global systolic function.  Right Atrium: The right atrium is normal in size.  Aortic Valve: The aortic valve was not well visualized. The aortic valve dimensionless index is 1.19. There is no evidence of aortic valve regurgitation. The peak instantaneous gradient of the aortic valve is 2 mmHg. The mean gradient of the aortic valve is 1 mmHg.  Mitral Valve: The mitral valve is normal in structure. There is trace mitral valve regurgitation.  Tricuspid Valve: The tricuspid valve is structurally normal. There is trace tricuspid regurgitation.  Pulmonic Valve: The pulmonic valve is structurally normal. There is no indication of pulmonic valve regurgitation.  Pericardium: There is no pericardial effusion noted.  Aorta: The aortic root is normal.  In comparison to the previous echocardiogram(s): Compared with study dated 10/16/2023, left ventricular function is now severely decreased.      CONCLUSIONS:  1. The left ventricular systolic function is severely decreased, with a visually estimated ejection fraction of 25-30%.  2. There is global hypokinesis of the left ventricle with minor regional variations.  3. Spectral Doppler shows a Grade II (pseudonormal pattern) of left ventricular diastolic filling with an elevated left atrial pressure.  4. There is normal right ventricular global systolic function.  5. Compared with  study dated 10/16/2023, left ventricular function is now severely decreased.    QUANTITATIVE DATA SUMMARY:    2D MEASUREMENTS:          Normal Ranges:  Ao Root s:       3.54 cm  IVSd:            1.05 cm  (0.6-1.1cm)  LVPWd:           0.92 cm  (0.6-1.1cm)  LVIDd:           4.52 cm  (3.9-5.9cm)  LVIDs:           3.19 cm  LV Mass Index:   70 g/m2  LVEDV Index:     69 ml/m2  LV % FS          29.5 %      LEFT ATRIUM:                  Normal Ranges:  LA Vol A4C:        50.7 ml    (22+/-6mL/m2)  LA Vol A2C:        54.9 ml  LA Vol BP:         53.3 ml  LA Vol Index A4C:  23.2ml/m2  LA Vol Index A2C:  25.1 ml/m2  LA Vol Index BP:   24.4 ml/m2  LA Area A4C:       17.1 cm2  LA Area A2C:       17.6 cm2  LA Major Axis A4C: 4.9 cm  LA Major Axis A2C: 4.8 cm  LA Volume Index:   24.4 ml/m2  LA Vol A4C:        48.4 ml  LA Vol A2C:        51.2 ml  LA Vol Index BSA:  22.8 ml/m2      RIGHT ATRIUM:                 Normal Ranges:  RA Vol A4C:        42.3 ml    (8.3-19.5ml)  RA Vol Index A4C:  19.4 ml/m2  RA Area A4C:       15.3 cm2  RA Major Axis A4C: 4.7 cm      AORTA MEASUREMENTS:         Normal Ranges:  Ao Sinus, d:        3.50 cm (2.1-3.5cm)  Ao STJ, d:          3.00 cm (1.7-3.4cm)  Asc Ao, d:          3.00 cm (2.1-3.4cm)      LV SYSTOLIC FUNCTION:  Normal Ranges:  EF-A4C View:    33 % (>=55%)  EF-A2C View:    10 %  EF-Biplane:     19 %  EF-Visual:      28 %  LV EF Reported: 28 %      LV DIASTOLIC FUNCTION:             Normal Ranges:  MV Peak E:             0.69 m/s    (0.7-1.2 m/s)  MV Peak A:             0.50 m/s    (0.42-0.7 m/s)  E/A Ratio:             1.39        (1.0-2.2)  MV e'                  0.120 m/s   (>8.0)  MV lateral e'          0.15 m/s  MV medial e'           0.09 m/s  MV A Dur:              108.47 msec  E/e' Ratio:            5.77        (<8.0)  PulmV Sys Boubacar:         34.71 cm/s  PulmV Acevedo Boubacar:        52.83 cm/s  PulmV S/D Boubacar:         0.66  PulmV A Revs Boubacar:      25.68 cm/s  PulmV A Revs Dur:      135.11  msec      MITRAL VALVE:          Normal Ranges:  MV DT:        206 msec (150-240msec)      AORTIC VALVE:                     Normal Ranges:  AoV Vmax:                0.70 m/s (<=1.7m/s)  AoV Peak P.9 mmHg (<20mmHg)  AoV Mean P.0 mmHg (1.7-11.5mmHg)  LVOT Max Boubacar:            0.70 m/s (<=1.1m/s)  AoV VTI:                 10.88 cm (18-25cm)  LVOT VTI:                12.91 cm  LVOT Diameter:           2.00 cm  (1.8-2.4cm)  AoV Area, VTI:           3.73 cm2 (2.5-5.5cm2)  AoV Area,Vmax:           3.16 cm2 (2.5-4.5cm2)  AoV Dimensionless Index: 1.19      RIGHT VENTRICLE:  RV Basal 4.10 cm  RV Mid   2.40 cm  RV Major 7.8 cm  TAPSE:   19.2 mm  RV s'    0.11 m/s      TRICUSPID VALVE/RVSP:         Normal Ranges:  Est. RA Pressure:     3 mmHg  IVC Diam:             2.30 cm      PULMONIC VALVE:          Normal Ranges:  PV Accel Time:  112 msec (>120ms)  PV Max Boubacar:     0.8 m/s  (0.6-0.9m/s)  PV Max P.3 mmHg      PULMONARY VEINS:  PulmV A Revs Dur: 135.11 msec  PulmV A Revs Boubacar: 25.68 cm/s  PulmV Acevedo Boubacar:   52.83 cm/s  PulmV S/D Boubacar:    0.66  PulmV Sys Boubacar:    34.71 cm/s      AORTA:  Asc Ao Diam 3.00 cm      [id number] Tonio Baker DO  Electronically signed on 2025 at 9:47:49 AM        ** Final **      Results for orders placed during the hospital encounter of 10/15/23    Transthoracic Echo (TTE) Complete    Narrative  [facility], [address] [place] [address]  Tel [phone] and Fax [phone]    TRANSTHORACIC ECHOCARDIOGRAM REPORT      Patient Name:      MEGAN Uriarte Physician:    [id number] Tonio Baker DO  Study Date:        10/16/2023           Ordering Provider:    [id number] CARL B GILLOMBARDO  MRN/PID:           [id number]             Fellow:  Accession#:        [id number]         Nurse:  Date of Birth/Age: 1980 / 43 years Sonographer:          Ray Heredia RDCS  Gender:            M                    Additional Staff:  Height:             167.00 cm            Admit Date:           10/15/2023  Weight:            117.00 kg            Admission Status:     Inpatient -  Routine  BSA:               2.22 m2              Encounter#:           [id number]  Department Location:  [facility] ICU  Blood Pressure: 98 /56 mmHg    Study Type:    TRANSTHORACIC ECHO (TTE) COMPLETE  Diagnosis/ICD: ST elevation (STEMI) myocardial infarction of unspecified  site-I21.3  Indication:    STEMI    Patient History:  Pertinent History: Acute MI.    Study Detail: The following Echo studies were performed: 2D, M-Mode, Doppler and  color flow. Technically challenging study due to body habitus.  Definity used as a contrast agent for endocardial border  definition. Total contrast used for this procedure was 4 mL via IV  push.      PHYSICIAN INTERPRETATION:  Left Ventricle: The left ventricular systolic function is low normal, with an estimated ejection fraction of 50-55%. Wall motion is abnormal. The left ventricular cavity size is normal. Spectral Doppler shows a normal pattern of left ventricular diastolic filling.  LV Wall Scoring:  The basal and mid inferior wall is hypokinetic. All remaining scored segments  are normal.    Left Atrium: The left atrium is normal in size.  Right Ventricle: The right ventricle is normal in size. There is normal right ventricular global systolic function.  Right Atrium: The right atrium is normal in size.  Aortic Valve: The aortic valve was not well visualized. There is no evidence of aortic valve regurgitation. The peak instantaneous gradient of the aortic valve is 5.2 mmHg. The mean gradient of the aortic valve is 3.0 mmHg.  Mitral Valve: The mitral valve is normal in structure. There is no evidence of mitral valve regurgitation.  Tricuspid Valve: The tricuspid valve is structurally normal. No evidence of tricuspid regurgitation.  Pulmonic Valve: The pulmonic valve is not well visualized. There is no indication of pulmonic valve  regurgitation.  Pericardium: There is no pericardial effusion noted.  Aorta: The aortic root is normal.  In comparison to the previous echocardiogram(s): There are no prior studies on this patient for comparison purposes.      CONCLUSIONS:  1. Left ventricular systolic function is low normal with a 50-55% estimated ejection fraction.  2. Basal and mid inferior wall is abnormal.  3. Poorly visualized anatomical structures due to suboptimal image quality.    QUANTITATIVE DATA SUMMARY:  2D MEASUREMENTS:  Normal Ranges:  LAs:           2.70 cm   (2.7-4.0cm)  IVSd:          0.90 cm   (0.6-1.1cm)  LVPWd:         1.00 cm   (0.6-1.1cm)  LVIDd:         4.70 cm   (3.9-5.9cm)  LVIDs:         3.10 cm  LV Mass Index: 74.0 g/m2  LV % FS        34.0 %    LA VOLUME:  Normal Ranges:  LA Vol A4C:        78.9 ml    (22+/-6mL/m2)  LA Vol A2C:        75.6 ml  LA Vol BP:         82.7 ml  LA Vol Index A4C:  35.5ml/m2  LA Vol Index A2C:  34.0 ml/m2  LA Vol Index BP:   37.2 ml/m2  LA Area A4C:       22.1 cm2  LA Area A2C:       20.2 cm2  LA Major Axis A4C: 5.3 cm  LA Major Axis A2C: 4.6 cm  LA Volume Index:   37.2 ml/m2    RA VOLUME BY A/L METHOD:  Normal Ranges:  RA Area A4C: 18.3 cm2    AORTA MEASUREMENTS:  Normal Ranges:  Ao Sinus, d: 2.97 cm (2.1-3.5cm)  Ao STJ, d:   2.66 cm (1.7-3.4cm)  Asc Ao, d:   2.78 cm (2.1-3.4cm)    LV SYSTOLIC FUNCTION BY 2D PLANIMETRY (MOD):  Normal Ranges:  EF-A4C View: 66.4 % (>=55%)  EF-A2C View: 63.7 %  EF-Biplane:  64.3 %    LV DIASTOLIC FUNCTION:  Normal Ranges:  MV Peak E:        0.76 m/s    (0.7-1.2 m/s)  MV Peak A:        0.51 m/s    (0.42-0.7 m/s)  E/A Ratio:        1.48        (1.0-2.2)  MV e'             0.09 m/s    (>8.0)  MV lateral e'     0.09 m/s  MV medial e'      0.09 m/s  MV A Dur:         103.00 msec  E/e' Ratio:       8.44        (<8.0)  PulmV Sys Boubacar:    54.40 cm/s  PulmV Acevedo Boubacar:   49.70 cm/s  PulmV S/D Boubacar:    1.10  PulmV A Revs Boubacar: 20.40 cm/s  PulmV A Revs Dur: 111.00 msec    MITRAL  VALVE:  Normal Ranges:  MV DT: 129 msec (150-240msec)    AORTIC VALVE:  Normal Ranges:  AoV Vmax:                1.14 m/s (<=1.7m/s)  AoV Peak P.2 mmHg (<20mmHg)  AoV Mean PG:             3.0 mmHg (1.7-11.5mmHg)  LVOT Max Boubacar:            1.00 m/s (<=1.1m/s)  AoV VTI:                 21.50 cm (18-25cm)  LVOT VTI:                18.20 cm  LVOT Diameter:           2.10 cm  (1.8-2.4cm)  AoV Area, VTI:           2.93 cm2 (2.5-5.5cm2)  AoV Area,Vmax:           3.04 cm2 (2.5-4.5cm2)  AoV Dimensionless Index: 0.85      RIGHT VENTRICLE:  RV Basal 4.36 cm  RV Mid   3.44 cm  RV Major 8.1 cm  TAPSE:   16.9 mm    TRICUSPID VALVE/RVSP:  Normal Ranges:  Est. RA Pressure: 3 mmHg  IVC Diam:         0.97 cm    PULMONIC VALVE:  Normal Ranges:  PV Accel Time: 151 msec (>120ms)  PV Max Boubacar:    0.9 m/s  (0.6-0.9m/s)  PV Max PG:     3.3 mmHg    Pulmonary Veins:  PulmV A Revs Dur: 111.00 msec  PulmV A Revs Boubacar: 20.40 cm/s  PulmV Acevedo Boubacar:   49.70 cm/s  PulmV S/D Boubacar:    1.10  PulmV Sys Boubacar:    54.40 cm/s      [id number] Tonio Baker DO  Electronically signed on 10/16/2023 at 12:31:59 PM      Wall Scoring        ** Final (Updated) **    Ejection Fractions:  LV EF   Date/Time Value Ref Range Status   2025 10:59 AM 28 %      Cath:  Results for orders placed during the hospital encounter of 10/15/23    Cardiac catheterization - coronary    Narrative  [facility], Cath Lab, [address] [place] [address]    Cardiovascular Catheterization Report    Patient Name:      MEGAN CORTES            Performing Physician:  [id number] Hall MD  Study Date:        10/15/2023           Verifying Physician:   Ken Hall MD  MRN/PID:           [id number]             Cardiologist/Co-scrub:  Accession#:        [id number]         Ordering Provider:     MACK INVASIVE  CARDIOLOGIST  REED  Date of Birth/Age: [birth year] 43 years Fellow:                [id number] Carl Gillombardo MD  Gender:            M                     Fellow:  Encounter#:        [id number]      Study:            Left Heart Cath  Additional Study: PCI - Percutaneous Coronary Intervention      Indications:  MEGAN CORTES is a 44 year old male who presents with coronary artery disease, dyslipidemia, hypertension, obesity, Active smoker and a chest pain assessment of typical angina. Acute coronary syndrome - STEMI.  Medical History:  Stress test performed: No. CTA performed: NoHawa Gama accessed: No. LVEF  Assessed: No.  Cardiac arrest: No.  Cardiac surgical consult: No.  Cardiovascular instability: No.  Frailty status of patient entering lab: 7 = Severely frail.      Procedure Description:  After infiltration with 1% Lidocaine, the right radial artery was cannulated with a modified Seldinger technique. Subsequently a 6 Turkmen sheath was placed retrograde in the right radial artery. The arterial sheath was sized up to 6 Turkmen. Selective coronary catheterization was performed using a 6 Fr and 5 Fr catheter(s) exchanged over a guide wire to cannulate the coronary arteries. A JL 3.5 tip catheter was used for left coronary injections. A JR 4 tip catheter was used for right coronary injections.  Multiple injections of contrast were made into the left and right coronary arteries with angiograms recorded in multiple projections.    Coronary Angiography:  The coronary circulation is right dominant.    Left Main Coronary Artery:  The left main coronary artery is a normal caliber vessel. The left main bifurcates into the LAD and circumflex coronary arteries and gives rise to the ramus intermedius artery. The left main coronary artery showed moderate atherosclerotic disease. The distal left main coronary artery showed 40%.    Left Anterior Descending Coronary Artery Distribution:  The left anterior descending coronary artery is a normal caliber vessel. The LAD arises normally from the left main coronary artery. The LAD demonstrated moderate atherosclerotic disease. The proximal  to mid left anterior descending coronary artery showed 50% stenosis. This lesion was long. The 1st diagonal branch showed no significant disease or stenosis greater than 30%. The 2nd diagonal branch demonstrated no significant disease or stenosis greater than 30%. The 3rd diagonal branch revealed no significant disease or stenosis greater than 30%.    Circumflex Coronary Artery Distribution:  The circumflex coronary artery is a normal caliber vessel. The circumflex arises normally from the left main coronary artery and terminates in the AV groove. The circumflex revealed no significant disease or stenosis greater than 30%. The 1st obtuse marginal branch showed no significant disease or stenosis greater than 30%. The 2nd obtuse marginal branch demonstrated no significant disease or stenosis greater than 30%.    Ramus Intermedius:  The ramus intermedius is a large caliber vessel. The mid ramus intermedius showed 50-60%.    Right Coronary Artery Distribution:    The right coronary artery is a normal caliber vessel. The RCA arises normally from the right sinus of Valsalva. The RCA showed total thrombotic occlusion. The mid right coronary artery showed 100% stenosis. The acute marginal branch showed no significant disease or stenosis greater than 30%.  The right posterolateral branch showed no significant disease or stenosis greater than 30%. The right posterior descending artery showed no significant disease or stenosis greater than 30%.    Coronary Interventions:  Angiography reveals a 100% stenosis of the mid right coronary artery coronary artery. Pre-intervention PANCHO flow was 0. Percutaneous coronary intervention was performed within the mid right coronary artery. The vessel was pre-dilated using a compliant balloon 2.5 mm x 12 mm at 12 CASPER. BRICE FRONTIER EMILY 3.0 mm x 22 mm was advanced to the lesion and implanted at 12 CASPER. The stenosis was successfully reduced from 100% to 0%. Post-intervention PANCHO flow was 3.  Chest pain resolved before the end of the case.    Coronary Lesion Summary:  Vessel      Stenosis    Vessel Segment  Left Main      40%          distal  LAD       50% stenosis  proximal to mid  Ramus        50-60%           mid  RCA       100% stenosis       mid      Hemo Personnel:  +----------------+---------+  Name            Duty       +----------------+---------+  Juan Manuel Hall MD 1  +----------------+---------+      Hemodynamic Pressures:    +----+---------------+----------+-------------+--------------+-------+---------+  Site   Date Time   Phase NameSystolic mmHgDiastolic mmHgED mmHgMean mmHg  +----+---------------+----------+-------------+--------------+-------+---------+    AO     10/15/2023  AIR REST          160            98             126           7:17:14 PM                                                       +----+---------------+----------+-------------+--------------+-------+---------+    AO     10/15/2023  AIR REST          146            96             121           7:18:24 PM                                                       +----+---------------+----------+-------------+--------------+-------+---------+    AO     10/15/2023  AIR REST          156           103             127           7:26:50 PM                                                       +----+---------------+----------+-------------+--------------+-------+---------+    LV     10/15/2023  AIR REST          162            -1     38                    7:39:10 PM                                                       +----+---------------+----------+-------------+--------------+-------+---------+    LV     10/15/2023  AIR REST          158            -1     33                    7:39:19 PM                                                       +----+---------------+----------+-------------+--------------+-------+---------+   LVp      10/15/2023  AIR REST          158             0     32                    7:39:24 PM                                                       +----+---------------+----------+-------------+--------------+-------+---------+   AOp     10/15/2023  AIR REST          141            88             111           7:39:32 PM                                                       +----+---------------+----------+-------------+--------------+-------+---------+      Cardiac Cath Post Procedure Notes:  Post Procedure Diagnosis: STEMI, RCA culprit.  Blood Loss:               Estimated blood loss during the procedure was 2 mls.  Specimens Removed:        Number of specimen(s) removed: none.      Recommendations:  Maximize medical therapy.  Agressive risk factor modification efforts.  Telemetry monitoring.  Follow-up with cardiology clinic.  Follow-up with primary care physician.  Monitor vitals and arterial access site/pulses.  Prompt evaluation for recurrent chest pain.  Anti-platelet therapy with Aspirin and Ticagrelor 90mgs BID.  Patient counseled and advised to discontinue smoking.  Consider referral to cardiac rehabilitation.    ____________________________________________________________________________________  CONCLUSIONS:  1. Inferior STEMI, now s/p PCI to mid RCA with 3.0 x 22 BRICE FRONTIER EMILY.  2. Right dominant coronary circulation, mid RCA culprit.  3. Moderate non-obstructive CAD elsewhere including distal left main, as described above.  4. Left Ventricular end-diastolic pressure = 38.    ICD 10 Codes:  ST elevation (STEMI) myocardial infarction involving right coronary artery-I21.11    CPT Codes:  Revasc during AMI stent/angio/atherc,ins asp Thrombectomy, Right Coronary single Vessel (PCI)-61221.RC; Moderate Sedation Services initial 15 minutes patient >5 years-05217; Moderate Sedation Services 8th additional 15 minutes patient >5 years-99488; Moderate Sedation Services 7th additional 15  "minutes patient >5 years-31835; Left Heart Cath (visualization of coronaries) and LV-20721    [id number] Juan Manuel Hall MD  Performing Physician  Electronically signed by [id number] Juan Manuel Hall MD on 10/17/2023 at 9:54:41 AM          ** Final **    Stress Test:  No results found for this or any previous visit.    Cardiac Imaging:  No results found for this or any previous visit.      Inpatient Medications:  Scheduled Medications[1]  PRN Medications[2]  Continuous Medications[3]    Physical Exam:  General:  Patient is awake, alert, and oriented.  Patient is in no acute distress.  HEENT:  Pupils equal and reactive.  Normocephalic.  Moist mucosa.    Neck:  No thyromegaly.  Normal Jugular Venous Pressure.  Cardiovascular:  Regular rate and rhythm.  Normal S1 and S2.  Pulmonary:  Clear to auscultation bilaterally.  Abdomen:  Soft. Non-tender.   Non-distended.  Positive bowel sounds.  Lower Extremities:  2+ pedal pulses. No LE edema.  Neurologic:  Cranial nerves intact.  No focal deficit.   Skin: Skin warm and dry, normal skin turgor.   Psychiatric: Normal affect.     Assessment/Plan   Cards: Samuel 10/25/23     Francisco May is a 45 y.o. male with PMH of prior DVT/PE in 2019(provoked 6 months therapy), smoker, premature family history of CAD, STEMI 10/2023 with EMILY x1 to RCA who now presents to [facility] with chest pain.  Cardiology is consulted for \"chest pain\"    4/29/25 [facility]  Right Radial 6 Fr -- TR band  Severe 2 vessel and branch vessel CAD in a right dominant system.  Successful OCT guided PCI of the RPDA with mechanical thrombectomy  Patent mid RCA stent  Integrilin bolus x 2 --> please continue Integrilin gtt for 6 more hours  Loaded with ASA and Prasugrel    TTE 4/2025  1. The left ventricular systolic function is severely decreased, with a visually estimated ejection fraction of 25-30%.   2. There is global hypokinesis of the left ventricle with minor regional variations.   3. Spectral Doppler shows a Grade II (pseudonormal " pattern) of left ventricular diastolic filling with an elevated left atrial pressure.   4. There is normal right ventricular global systolic function.   5. Compared with study dated 10/16/2023, left ventricular function is now severely decreased.         #NSTEMI  - s/p [facility] 4/28 severe multi vessel dx  #ICM HEFrEF 25-30%  #Diabetes type 2-untreated  #Dyslipidemia  #Current smoker  #Medication noncompliance  #Hypokalemia-resolved      RECS:  -Severe three-vessel disease noted including mid 70 to 80% LAD, LCx and ramus disease and thrombotic distal RCA into PDA. PDA felt to be culprit lesion. After discussion with Dr. Vargas decided to proceed with thrombectomy and possible stenting. With aspiration thrombectomy lesion completely resolved and OCT showed no heavy atherosclerotic burden in the culprit area. Due to concern regarding compliance with medications and good thrombectomy result we decided to stop there and treat with medical therapy for now.   -Will get CT surgery consult while in house  -c/w DAPT with Prasugrel and ASA  -c/w Lipitor 80mg daily  -GDMT with Aldactone 25mg daily, metoprolol succinate 50 mg daily, losartan 12.5 mg daily and Jardiance 10mg daily       Code Status:  Full Code    I spent 30 minutes in the professional and overall care of this patient.        Lily Rowley, APRN-CNP         [1]   Scheduled medications   Medication Dose Route Frequency    aspirin  81 mg oral Daily    atorvastatin  80 mg oral Nightly    empagliflozin  10 mg oral Daily    insulin lispro  0-15 Units subcutaneous TID    insulin NPH (Isophane)  10 Units subcutaneous q12h [facility]    losartan  12.5 mg oral Daily    metoprolol succinate XL  50 mg oral Daily    oxygen   inhalation Continuous - 02/gases    pantoprazole  20 mg oral Daily before breakfast    perflutren protein A microsphere  0.5 mL intravenous Once in imaging    prasugrel  10 mg oral Daily    sennosides  2 tablet oral BID    spironolactone  25 mg oral Daily    sulfur  hexafluoride microsphr  2 mL intravenous Once in imaging   [2]   PRN medications   Medication    acetaminophen    dextrose    dextrose    glucagon    glucagon    melatonin    nitroglycerin    ondansetron ODT    Or    ondansetron   [3]   Continuous Medications   Medication Dose Last Rate

## 2025-04-29 NOTE — CARE PLAN
Problem: Pain - Adult  Goal: Verbalizes/displays adequate comfort level or baseline comfort level  Outcome: Progressing     Problem: Safety - Adult  Goal: Free from fall injury  Outcome: Progressing     Problem: Discharge Planning  Goal: Discharge to home or other facility with appropriate resources  Outcome: Progressing     Problem: Chronic Conditions and Co-morbidities  Goal: Patient's chronic conditions and co-morbidity symptoms are monitored and maintained or improved  Outcome: Progressing     Problem: Nutrition  Goal: Nutrient intake appropriate for maintaining nutritional needs  Outcome: Progressing     Problem: Pain  Goal: Takes deep breaths with improved pain control throughout the shift  Outcome: Progressing  Goal: Turns in bed with improved pain control throughout the shift  Outcome: Progressing  Goal: Walks with improved pain control throughout the shift  Outcome: Progressing  Goal: Performs ADL's with improved pain control throughout shift  Outcome: Progressing  Goal: Participates in PT with improved pain control throughout the shift  Outcome: Progressing  Goal: Free from opioid side effects throughout the shift  Outcome: Progressing  Goal: Free from acute confusion related to pain meds throughout the shift  Outcome: Progressing   The patient's goals for the shift include      The clinical goals for the shift include pt to remain hds throughtout shift

## 2025-04-29 NOTE — PROGRESS NOTES
04/29/25 1206   Discharge Planning   Expected Discharge Disposition Home   Does the patient need discharge transport arranged? Yes   RoundTrip coordination needed? Yes   Has discharge transport been arranged? No  (Wife will transport home)         Patient had cardiac catheterization done yesterday and old stent was cleaned out. Patient had nose bleed after cath and Integrelin drip was discontinued. Endo is following for insulin. Patient doesn't have health insurance. SW sent for screening. Diabetic educator is following. Patient will go home with his wife when medically ready.

## 2025-04-30 ENCOUNTER — PHARMACY VISIT (OUTPATIENT)
Dept: PHARMACY | Facility: CLINIC | Age: 45
End: 2025-04-30
Payer: COMMERCIAL

## 2025-04-30 VITALS
SYSTOLIC BLOOD PRESSURE: 101 MMHG | BODY MASS INDEX: 39.33 KG/M2 | HEART RATE: 87 BPM | HEIGHT: 66 IN | OXYGEN SATURATION: 96 % | WEIGHT: 244.71 LBS | DIASTOLIC BLOOD PRESSURE: 66 MMHG | RESPIRATION RATE: 17 BRPM | TEMPERATURE: 98.3 F

## 2025-04-30 LAB
GLUCOSE BLD MANUAL STRIP-MCNC: 154 MG/DL (ref 74–99)
GLUCOSE BLD MANUAL STRIP-MCNC: 310 MG/DL (ref 74–99)
GLUCOSE BLD MANUAL STRIP-MCNC: 402 MG/DL (ref 74–99)

## 2025-04-30 PROCEDURE — 2500000002 HC RX 250 W HCPCS SELF ADMINISTERED DRUGS (ALT 637 FOR MEDICARE OP, ALT 636 FOR OP/ED): Performed by: INTERNAL MEDICINE

## 2025-04-30 PROCEDURE — 2500000001 HC RX 250 WO HCPCS SELF ADMINISTERED DRUGS (ALT 637 FOR MEDICARE OP): Performed by: NURSE PRACTITIONER

## 2025-04-30 PROCEDURE — 2500000001 HC RX 250 WO HCPCS SELF ADMINISTERED DRUGS (ALT 637 FOR MEDICARE OP): Performed by: INTERNAL MEDICINE

## 2025-04-30 PROCEDURE — 99239 HOSP IP/OBS DSCHRG MGMT >30: CPT | Performed by: INTERNAL MEDICINE

## 2025-04-30 PROCEDURE — 99231 SBSQ HOSP IP/OBS SF/LOW 25: CPT | Performed by: INTERNAL MEDICINE

## 2025-04-30 PROCEDURE — 2500000002 HC RX 250 W HCPCS SELF ADMINISTERED DRUGS (ALT 637 FOR MEDICARE OP, ALT 636 FOR OP/ED): Performed by: NURSE PRACTITIONER

## 2025-04-30 PROCEDURE — 99233 SBSQ HOSP IP/OBS HIGH 50: CPT | Performed by: INTERNAL MEDICINE

## 2025-04-30 PROCEDURE — RXMED WILLOW AMBULATORY MEDICATION CHARGE

## 2025-04-30 PROCEDURE — 82947 ASSAY GLUCOSE BLOOD QUANT: CPT

## 2025-04-30 PROCEDURE — 2500000005 HC RX 250 GENERAL PHARMACY W/O HCPCS: Performed by: NURSE PRACTITIONER

## 2025-04-30 RX ORDER — INSULIN LISPRO 100 [IU]/ML
12 INJECTION, SUSPENSION SUBCUTANEOUS 2 TIMES DAILY
Qty: 15 ML | Refills: 0 | Status: SHIPPED | OUTPATIENT
Start: 2025-04-30 | End: 2026-04-30

## 2025-04-30 RX ORDER — LOSARTAN POTASSIUM 25 MG/1
12.5 TABLET ORAL DAILY
Qty: 15 TABLET | Refills: 0 | Status: SHIPPED | OUTPATIENT
Start: 2025-05-01 | End: 2025-05-31

## 2025-04-30 RX ORDER — ATORVASTATIN CALCIUM 80 MG/1
80 TABLET, FILM COATED ORAL NIGHTLY
Qty: 30 TABLET | Refills: 0 | Status: SHIPPED | OUTPATIENT
Start: 2025-04-30 | End: 2025-05-30

## 2025-04-30 RX ORDER — PEN NEEDLE, DIABETIC 30 GX3/16"
NEEDLE, DISPOSABLE MISCELLANEOUS
Qty: 100 EACH | Refills: 0 | OUTPATIENT
Start: 2025-04-30

## 2025-04-30 RX ORDER — ASPIRIN 81 MG/1
81 TABLET ORAL DAILY
Qty: 30 TABLET | Refills: 0 | Status: SHIPPED | OUTPATIENT
Start: 2025-05-01 | End: 2025-05-31

## 2025-04-30 RX ORDER — METOPROLOL SUCCINATE 50 MG/1
50 TABLET, EXTENDED RELEASE ORAL DAILY
Qty: 30 TABLET | Refills: 0 | Status: SHIPPED | OUTPATIENT
Start: 2025-05-01 | End: 2025-05-31

## 2025-04-30 RX ORDER — NITROGLYCERIN 0.4 MG/1
0.4 TABLET SUBLINGUAL EVERY 5 MIN PRN
Qty: 90 TABLET | Refills: 12 | Status: SHIPPED | OUTPATIENT
Start: 2025-04-30

## 2025-04-30 RX ORDER — DAPAGLIFLOZIN 10 MG/1
10 TABLET, FILM COATED ORAL DAILY
Qty: 30 TABLET | Refills: 0 | Status: SHIPPED | OUTPATIENT
Start: 2025-05-01 | End: 2025-05-31

## 2025-04-30 RX ORDER — SPIRONOLACTONE 25 MG/1
25 TABLET ORAL DAILY
Qty: 30 TABLET | Refills: 0 | Status: SHIPPED | OUTPATIENT
Start: 2025-05-01 | End: 2025-05-31

## 2025-04-30 RX ADMIN — LOSARTAN POTASSIUM 12.5 MG: 25 TABLET, FILM COATED ORAL at 09:11

## 2025-04-30 RX ADMIN — SPIRONOLACTONE 25 MG: 25 TABLET ORAL at 09:12

## 2025-04-30 RX ADMIN — INSULIN HUMAN 10 UNITS: 100 INJECTION, SUSPENSION SUBCUTANEOUS at 09:14

## 2025-04-30 RX ADMIN — METOPROLOL SUCCINATE 50 MG: 50 TABLET, EXTENDED RELEASE ORAL at 09:12

## 2025-04-30 RX ADMIN — SENNOSIDES 17.2 MG: 8.6 TABLET ORAL at 09:12

## 2025-04-30 RX ADMIN — Medication 2 L/MIN: at 09:16

## 2025-04-30 RX ADMIN — PANTOPRAZOLE SODIUM 20 MG: 20 TABLET, DELAYED RELEASE ORAL at 06:14

## 2025-04-30 RX ADMIN — INSULIN LISPRO 15 UNITS: 100 INJECTION, SOLUTION INTRAVENOUS; SUBCUTANEOUS at 09:13

## 2025-04-30 RX ADMIN — ASPIRIN 81 MG: 81 TABLET, COATED ORAL at 09:12

## 2025-04-30 RX ADMIN — DAPAGLIFLOZIN 10 MG: 10 TABLET, FILM COATED ORAL at 09:12

## 2025-04-30 RX ADMIN — PRASUGREL 10 MG: 10 TABLET, FILM COATED ORAL at 09:12

## 2025-04-30 RX ADMIN — INSULIN LISPRO 12 UNITS: 100 INJECTION, SOLUTION INTRAVENOUS; SUBCUTANEOUS at 12:24

## 2025-04-30 ASSESSMENT — COGNITIVE AND FUNCTIONAL STATUS - GENERAL
DAILY ACTIVITIY SCORE: 24
MOBILITY SCORE: 24

## 2025-04-30 ASSESSMENT — PAIN SCALES - GENERAL: PAINLEVEL_OUTOF10: 0 - NO PAIN

## 2025-04-30 NOTE — CARE PLAN
The patient's goals for the shift include      The clinical goals for the shift include remain hd stable      Problem: Pain - Adult  Goal: Verbalizes/displays adequate comfort level or baseline comfort level  Outcome: Progressing     Problem: Safety - Adult  Goal: Free from fall injury  Outcome: Progressing     Problem: Discharge Planning  Goal: Discharge to home or other facility with appropriate resources  Outcome: Progressing     Problem: Chronic Conditions and Co-morbidities  Goal: Patient's chronic conditions and co-morbidity symptoms are monitored and maintained or improved  Outcome: Progressing     Problem: Nutrition  Goal: Nutrient intake appropriate for maintaining nutritional needs  Outcome: Progressing     Problem: Pain  Goal: Takes deep breaths with improved pain control throughout the shift  Outcome: Progressing  Goal: Turns in bed with improved pain control throughout the shift  Outcome: Progressing  Goal: Walks with improved pain control throughout the shift  Outcome: Progressing  Goal: Performs ADL's with improved pain control throughout shift  Outcome: Progressing  Goal: Participates in PT with improved pain control throughout the shift  Outcome: Progressing  Goal: Free from opioid side effects throughout the shift  Outcome: Progressing  Goal: Free from acute confusion related to pain meds throughout the shift  Outcome: Progressing

## 2025-04-30 NOTE — PROGRESS NOTES
04/30/25 1341   Discharge Planning   Home or Post Acute Services None   Expected Discharge Disposition Home   Does the patient need discharge transport arranged? No  (Spouse will transport)   Stroke Family Assessment   Stroke Family Assessment Needed No   Intensity of Service   Intensity of Service 0-30 min     PLAN/BARRIER: CT surgery clearance  DISP: home  DME: none  O2: none  WOUNDS: none  HHC: none  ADOD: possibly later today or tomorrow  Patient denies any discharge needs.  Apoorva Matson RN

## 2025-04-30 NOTE — PROGRESS NOTES
Subjective Data:  Pressures soft  Patient feels good asymptomatic walking around no chest pressure no shortness of breath no dizziness     Objective Data:  Last Recorded Vitals:  Vitals:    25 2346 25 0400 25 0805 25 1141   BP: 115/76 95/58 98/57 105/68   BP Location:  Left arm Right arm Right arm   Patient Position:  Lying Sitting Lying   Pulse: 82  98 80   Resp: 16 17     Temp: 37.1 °C (98.8 °F) 36.2 °C (97.2 °F) 36.5 °C (97.7 °F) 36.8 °C (98.3 °F)   TempSrc:  Temporal Oral Oral   SpO2: 99% 98% 98% 98%   Weight:       Height:         Medical Gas Therapy: None (Room air)  Medical Gas Delivery Method: Nasal Cannula via Capnography  Weight  Av kg (244 lb 13.7 oz)  Min: 111 kg (244 lb 11.4 oz)  Max: 111 kg (245 lb)    LABS:  CMP:  Results from last 7 days   Lab Units 25  0931 25  1501 25  1129 25  0559 25  1940   SODIUM mmol/L 131*  --  133* 138 132*   POTASSIUM mmol/L 3.8 4.4 4.5 2.9* 4.3   CHLORIDE mmol/L 103  --  104 114* 99   CO2 mmol/L 19*  --  25 20* 26   ANION GAP mmol/L 13  --  9* 7* 11   BUN mg/dL 11  --  10 8 9   CREATININE mg/dL 0.65  --  0.75 0.51 0.82   EGFR mL/min/1.73m*2 >90  --  >90 >90 >90   MAGNESIUM mg/dL  --   --   --  1.55*  --    ALBUMIN g/dL  --   --   --   --  4.2   ALT U/L  --   --   --   --  23   AST U/L  --   --   --   --  81*   BILIRUBIN TOTAL mg/dL  --   --   --   --  1.0   LIPASE U/L  --   --   --   --  142*     CBC:  Results from last 7 days   Lab Units 25  0559 250   WBC AUTO x10*3/uL 13.8* 16.0*   HEMOGLOBIN g/dL 15.4 16.6   HEMATOCRIT % 44.2 46.3   PLATELETS AUTO x10*3/uL 192 231   MCV fL 86 85     COAG:   Results from last 7 days   Lab Units 25   INR  1.1     ABO:   ABO TYPE   Date Value Ref Range Status   2025 A  Final     HEME/ENDO:  Results from last 7 days   Lab Units 25  1940   HEMOGLOBIN A1C % 11.3*      CARDIAC:   Results from last 7 days   Lab Units 25  1501  04/28/25  0508 04/28/25  0045 04/27/25 2042 04/27/25 1940   TROPHS ng/L 14,468* 16,117* 18,053* 13,349* 12,213*      Results from last 7 days   Lab Units 04/27/25 1940   HEMOGLOBIN A1C % 11.3*        Last I/O:    Intake/Output Summary (Last 24 hours) at 4/30/2025 1200  Last data filed at 4/30/2025 1150  Gross per 24 hour   Intake 632.82 ml   Output --   Net 632.82 ml     Net IO Since Admission: -427.18 mL [04/30/25 1200]      Imaging Results:  Transthoracic Echo Complete  Result Date: 4/28/2025   [facility], [address] [place] [address]              Tel [phone] and Fax [phone] TRANSTHORACIC ECHOCARDIOGRAM REPORT  Patient Name:       MEGAN Uriarte Physician:    [id number] Tonio Baker DO Study Date:         4/28/2025           Ordering Provider:    [id number] MAVIS RODRIGUEZ MRN/PID:            [id number]            Fellow: Accession#:         [id number]        Nurse: Date of Birth/Age:  1980 / 45      Sonographer:          Ambrosio hammond Gender assigned at                     Additional Staff: Birth: Height:             168.00 cm           Admit Date:           4/27/2025 Weight:             111.00 kg           Admission Status:     Inpatient -                                                               Routine BSA / BMI:          2.18 m2 / 39.33     Encounter#:           [id number]                     kg/m2 Blood Pressure:     110/71 mmHg         Department Location:  [facility] Non                                                               Invasive Study Type:    TRANSTHORACIC ECHO (TTE) COMPLETE Diagnosis/ICD: Non ST elevation (NSTEMI) myocardial infarction-I21.4 Indication:    Here for NSTEMI, Gallop heard on exam CPT Code:      Echo Complete w Full [id number] Patient History: Diabetes:          Yes Pertinent History:  Previous DVT. Acute MI (inferior wall), STEMI, NSTEMI. Study Detail: The following Echo studies were performed: 2D, M-Mode, Doppler and               color flow. Technically challenging study due to body habitus.               Definity used as a contrast agent for endocardial border               definition. Total contrast used for this procedure was 2 mL via IV               push.  PHYSICIAN INTERPRETATION: Left Ventricle: The left ventricular systolic function is severely decreased, with a visually estimated ejection fraction of 25-30%. There is global hypokinesis of the left ventricle with minor regional variations. The left ventricular cavity size is normal. There is mildly increased septal and normal posterior left ventricular wall thickness. Spectral Doppler shows a Grade II (pseudonormal pattern) of left ventricular diastolic filling with an elevated left atrial pressure. Left Atrium: The left atrial size is normal. Right Ventricle: The right ventricle is normal in size. There is normal right ventricular global systolic function. Right Atrium: The right atrium is normal in size. Aortic Valve: The aortic valve was not well visualized. The aortic valve dimensionless index is 1.19. There is no evidence of aortic valve regurgitation. The peak instantaneous gradient of the aortic valve is 2 mmHg. The mean gradient of the aortic valve is 1 mmHg. Mitral Valve: The mitral valve is normal in structure. There is trace mitral valve regurgitation. Tricuspid Valve: The tricuspid valve is structurally normal. There is trace tricuspid regurgitation. Pulmonic Valve: The pulmonic valve is structurally normal. There is no indication of pulmonic valve regurgitation. Pericardium: There is no pericardial effusion noted. Aorta: The aortic root is normal. In comparison to the previous echocardiogram(s): Compared with study dated 10/16/2023, left ventricular function is now severely decreased.  CONCLUSIONS:  1. The left ventricular  systolic function is severely decreased, with a visually estimated ejection fraction of 25-30%.  2. There is global hypokinesis of the left ventricle with minor regional variations.  3. Spectral Doppler shows a Grade II (pseudonormal pattern) of left ventricular diastolic filling with an elevated left atrial pressure.  4. There is normal right ventricular global systolic function.  5. Compared with study dated 10/16/2023, left ventricular function is now severely decreased. QUANTITATIVE DATA SUMMARY:  2D MEASUREMENTS:          Normal Ranges: Ao Root s:       3.54 cm IVSd:            1.05 cm  (0.6-1.1cm) LVPWd:           0.92 cm  (0.6-1.1cm) LVIDd:           4.52 cm  (3.9-5.9cm) LVIDs:           3.19 cm LV Mass Index:   70 g/m2 LVEDV Index:     69 ml/m2 LV % FS          29.5 %  LEFT ATRIUM:                  Normal Ranges: LA Vol A4C:        50.7 ml    (22+/-6mL/m2) LA Vol A2C:        54.9 ml LA Vol BP:         53.3 ml LA Vol Index A4C:  23.2ml/m2 LA Vol Index A2C:  25.1 ml/m2 LA Vol Index BP:   24.4 ml/m2 LA Area A4C:       17.1 cm2 LA Area A2C:       17.6 cm2 LA Major Axis A4C: 4.9 cm LA Major Axis A2C: 4.8 cm LA Volume Index:   24.4 ml/m2 LA Vol A4C:        48.4 ml LA Vol A2C:        51.2 ml LA Vol Index BSA:  22.8 ml/m2  RIGHT ATRIUM:                 Normal Ranges: RA Vol A4C:        42.3 ml    (8.3-19.5ml) RA Vol Index A4C:  19.4 ml/m2 RA Area A4C:       15.3 cm2 RA Major Axis A4C: 4.7 cm  AORTA MEASUREMENTS:         Normal Ranges: Ao Sinus, d:        3.50 cm (2.1-3.5cm) Ao STJ, d:          3.00 cm (1.7-3.4cm) Asc Ao, d:          3.00 cm (2.1-3.4cm)  LV SYSTOLIC FUNCTION:                      Normal Ranges: EF-A4C View:    33 % (>=55%) EF-A2C View:    10 % EF-Biplane:     19 % EF-Visual:      28 % LV EF Reported: 28 %  LV DIASTOLIC FUNCTION:             Normal Ranges: MV Peak E:             0.69 m/s    (0.7-1.2 m/s) MV Peak A:             0.50 m/s    (0.42-0.7 m/s) E/A Ratio:             1.39        (1.0-2.2) MV  e'                  0.120 m/s   (>8.0) MV lateral e'          0.15 m/s MV medial e'           0.09 m/s MV A Dur:              108.47 msec E/e' Ratio:            5.77        (<8.0) PulmV Sys Boubacar:         34.71 cm/s PulmV Acevedo Boubacar:        52.83 cm/s PulmV S/D Boubacar:         0.66 PulmV A Revs Boubacar:      25.68 cm/s PulmV A Revs Dur:      135.11 msec  MITRAL VALVE:          Normal Ranges: MV DT:        206 msec (150-240msec)  AORTIC VALVE:                     Normal Ranges: AoV Vmax:                0.70 m/s (<=1.7m/s) AoV Peak P.9 mmHg (<20mmHg) AoV Mean P.0 mmHg (1.7-11.5mmHg) LVOT Max Boubacar:            0.70 m/s (<=1.1m/s) AoV VTI:                 10.88 cm (18-25cm) LVOT VTI:                12.91 cm LVOT Diameter:           2.00 cm  (1.8-2.4cm) AoV Area, VTI:           3.73 cm2 (2.5-5.5cm2) AoV Area,Vmax:           3.16 cm2 (2.5-4.5cm2) AoV Dimensionless Index: 1.19  RIGHT VENTRICLE: RV Basal 4.10 cm RV Mid   2.40 cm RV Major 7.8 cm TAPSE:   19.2 mm RV s'    0.11 m/s  TRICUSPID VALVE/RVSP:         Normal Ranges: Est. RA Pressure:     3 mmHg IVC Diam:             2.30 cm  PULMONIC VALVE:          Normal Ranges: PV Accel Time:  112 msec (>120ms) PV Max Boubacar:     0.8 m/s  (0.6-0.9m/s) PV Max P.3 mmHg  PULMONARY VEINS: PulmV A Revs Dur: 135.11 msec PulmV A Revs Boubacar: 25.68 cm/s PulmV Acevedo Boubacar:   52.83 cm/s PulmV S/D Boubacar:    0.66 PulmV Sys Boubacar:    34.71 cm/s  AORTA: Asc Ao Diam 3.00 cm  [id number] Tonio Baker DO Electronically signed on 2025 at 9:47:49 AM  ** Final **     ECG 12 lead  Result Date: 2025  Normal sinus rhythm Possible Inferior infarct (cited on or before 16-OCT-2023) Abnormal ECG When compared with ECG of 2025 19:12, (unconfirmed) Nonspecific T wave abnormality, worse in Inferior leads See ED provider note for full interpretation and clinical correlation Confirmed by Ana Enriquez ([id number]) on 2025 10:43:18 AM    Electrocardiogram, 12-lead PRN ACS symptoms  Result  Date: 4/28/2025  Normal sinus rhythm Possible Inferior infarct (cited on or before 16-OCT-2023) Cannot rule out Anterior infarct , age undetermined Abnormal ECG When compared with ECG of 17-OCT-2023 10:33, T wave inversion no longer evident in Inferior leads See ED provider note for full interpretation and clinical correlation Confirmed by Ana Enriquez ([id number]) on 4/28/2025 10:42:28 AM    Electrocardiogram, 12-lead PRN ACS symptoms  Result Date: 4/28/2025  Normal sinus rhythm Possible Lateral infarct , age undetermined Abnormal ECG Confirmed by Perry Rose ([id number]) on 4/28/2025 9:25:00 AM    XR chest 2 views  Result Date: 4/27/2025  Interpreted By:  Sav Vann, STUDY: XR CHEST 2 VIEWS   INDICATION: Signs/Symptoms:CP.   COMPARISON: October 15, 2023     ACCESSION NUMBER(S): [id number]   ORDERING CLINICIAN: GABBIE VERONICA   FINDINGS: Mild cardiomegaly. No consolidation, effusion, edema, or pneumothorax.         No evidence of acute intrathoracic abnormality.   Signed by: Sav Vann 4/27/2025 8:08 PM Dictation workstation:   [id number]          Past Cardiology Tests (Last 3 Years):  EKG:  Results for orders placed during the hospital encounter of 04/27/25    Electrocardiogram, 12-lead PRN ACS symptoms    Narrative  Normal sinus rhythm  Possible Inferior infarct (cited on or before 16-OCT-2023)  Cannot rule out Anterior infarct , age undetermined  Abnormal ECG  When compared with ECG of 17-OCT-2023 10:33,  T wave inversion no longer evident in Inferior leads  See ED provider note for full interpretation and clinical correlation  Confirmed by Ana Enriquez ([id number]) on 4/28/2025 10:42:28 AM      Electrocardiogram, 12-lead PRN ACS symptoms    Narrative  Normal sinus rhythm  Possible Lateral infarct , age undetermined  Abnormal ECG  Confirmed by Perry Rose ([id number]) on 4/28/2025 9:25:00 AM      ECG 12 lead    Narrative  Normal sinus rhythm  Possible Inferior infarct (cited on or before 16-OCT-2023)  Abnormal  ECG  When compared with ECG of 27-APR-2025 19:12, (unconfirmed)  Nonspecific T wave abnormality, worse in Inferior leads  See ED provider note for full interpretation and clinical correlation  Confirmed by Ana Enriquez ([id number]) on 4/28/2025 10:43:18 AM    Echo:  Results for orders placed during the hospital encounter of 04/27/25    Transthoracic Echo Complete    Narrative  [facility], [address] [place] [address]  Tel [phone] and Fax [phone]    TRANSTHORACIC ECHOCARDIOGRAM REPORT      Patient Name:       MEGAN CORTES           Kalani Physician:    [id number] Tonio Baker DO  Study Date:         4/28/2025           Ordering Provider:    [id number] MAVIS RODRIGUEZ  MRN/PID:            [id number]            Fellow:  Accession#:         [id number]        Nurse:  Date of Birth/Age:  1980 / 45      Sonographer:          Ambrosio hammond  Gender assigned at                     Additional Staff:  Birth:  Height:             168.00 cm           Admit Date:           4/27/2025  Weight:             111.00 kg           Admission Status:     Inpatient -  Routine  BSA / BMI:          2.18 m2 / 39.33     Encounter#:           [id number]  kg/m2  Blood Pressure:     110/71 mmHg         Department Location:  [facility] Non  Invasive    Study Type:    TRANSTHORACIC ECHO (TTE) COMPLETE  Diagnosis/ICD: Non ST elevation (NSTEMI) myocardial infarction-I21.4  Indication:    Here for NSTEMI, Gallop heard on exam  CPT Code:      Echo Complete w Full Doppler-[id number]    Patient History:  Diabetes:          Yes  Pertinent History: Previous DVT. Acute MI (inferior wall), STEMI, NSTEMI.    Study Detail: The following Echo studies were performed: 2D, M-Mode, Doppler and  color flow. Technically challenging study due to body habitus.  Definity used as a contrast agent for endocardial border  definition. Total contrast used for this procedure was 2 mL via IV  push.      PHYSICIAN INTERPRETATION:  Left Ventricle: The left  ventricular systolic function is severely decreased, with a visually estimated ejection fraction of 25-30%. There is global hypokinesis of the left ventricle with minor regional variations. The left ventricular cavity size is normal. There is mildly increased septal and normal posterior left ventricular wall thickness. Spectral Doppler shows a Grade II (pseudonormal pattern) of left ventricular diastolic filling with an elevated left atrial pressure.  Left Atrium: The left atrial size is normal.  Right Ventricle: The right ventricle is normal in size. There is normal right ventricular global systolic function.  Right Atrium: The right atrium is normal in size.  Aortic Valve: The aortic valve was not well visualized. The aortic valve dimensionless index is 1.19. There is no evidence of aortic valve regurgitation. The peak instantaneous gradient of the aortic valve is 2 mmHg. The mean gradient of the aortic valve is 1 mmHg.  Mitral Valve: The mitral valve is normal in structure. There is trace mitral valve regurgitation.  Tricuspid Valve: The tricuspid valve is structurally normal. There is trace tricuspid regurgitation.  Pulmonic Valve: The pulmonic valve is structurally normal. There is no indication of pulmonic valve regurgitation.  Pericardium: There is no pericardial effusion noted.  Aorta: The aortic root is normal.  In comparison to the previous echocardiogram(s): Compared with study dated 10/16/2023, left ventricular function is now severely decreased.      CONCLUSIONS:  1. The left ventricular systolic function is severely decreased, with a visually estimated ejection fraction of 25-30%.  2. There is global hypokinesis of the left ventricle with minor regional variations.  3. Spectral Doppler shows a Grade II (pseudonormal pattern) of left ventricular diastolic filling with an elevated left atrial pressure.  4. There is normal right ventricular global systolic function.  5. Compared with study dated  10/16/2023, left ventricular function is now severely decreased.    QUANTITATIVE DATA SUMMARY:    2D MEASUREMENTS:          Normal Ranges:  Ao Root s:       3.54 cm  IVSd:            1.05 cm  (0.6-1.1cm)  LVPWd:           0.92 cm  (0.6-1.1cm)  LVIDd:           4.52 cm  (3.9-5.9cm)  LVIDs:           3.19 cm  LV Mass Index:   70 g/m2  LVEDV Index:     69 ml/m2  LV % FS          29.5 %      LEFT ATRIUM:                  Normal Ranges:  LA Vol A4C:        50.7 ml    (22+/-6mL/m2)  LA Vol A2C:        54.9 ml  LA Vol BP:         53.3 ml  LA Vol Index A4C:  23.2ml/m2  LA Vol Index A2C:  25.1 ml/m2  LA Vol Index BP:   24.4 ml/m2  LA Area A4C:       17.1 cm2  LA Area A2C:       17.6 cm2  LA Major Axis A4C: 4.9 cm  LA Major Axis A2C: 4.8 cm  LA Volume Index:   24.4 ml/m2  LA Vol A4C:        48.4 ml  LA Vol A2C:        51.2 ml  LA Vol Index BSA:  22.8 ml/m2      RIGHT ATRIUM:                 Normal Ranges:  RA Vol A4C:        42.3 ml    (8.3-19.5ml)  RA Vol Index A4C:  19.4 ml/m2  RA Area A4C:       15.3 cm2  RA Major Axis A4C: 4.7 cm      AORTA MEASUREMENTS:         Normal Ranges:  Ao Sinus, d:        3.50 cm (2.1-3.5cm)  Ao STJ, d:          3.00 cm (1.7-3.4cm)  Asc Ao, d:          3.00 cm (2.1-3.4cm)      LV SYSTOLIC FUNCTION:  Normal Ranges:  EF-A4C View:    33 % (>=55%)  EF-A2C View:    10 %  EF-Biplane:     19 %  EF-Visual:      28 %  LV EF Reported: 28 %      LV DIASTOLIC FUNCTION:             Normal Ranges:  MV Peak E:             0.69 m/s    (0.7-1.2 m/s)  MV Peak A:             0.50 m/s    (0.42-0.7 m/s)  E/A Ratio:             1.39        (1.0-2.2)  MV e'                  0.120 m/s   (>8.0)  MV lateral e'          0.15 m/s  MV medial e'           0.09 m/s  MV A Dur:              108.47 msec  E/e' Ratio:            5.77        (<8.0)  PulmV Sys Boubacar:         34.71 cm/s  PulmV Acevedo Boubacar:        52.83 cm/s  PulmV S/D Boubacar:         0.66  PulmV A Revs Boubacar:      25.68 cm/s  PulmV A Revs Dur:      135.11 msec      MITRAL  VALVE:          Normal Ranges:  MV DT:        206 msec (150-240msec)      AORTIC VALVE:                     Normal Ranges:  AoV Vmax:                0.70 m/s (<=1.7m/s)  AoV Peak P.9 mmHg (<20mmHg)  AoV Mean P.0 mmHg (1.7-11.5mmHg)  LVOT Max Boubacar:            0.70 m/s (<=1.1m/s)  AoV VTI:                 10.88 cm (18-25cm)  LVOT VTI:                12.91 cm  LVOT Diameter:           2.00 cm  (1.8-2.4cm)  AoV Area, VTI:           3.73 cm2 (2.5-5.5cm2)  AoV Area,Vmax:           3.16 cm2 (2.5-4.5cm2)  AoV Dimensionless Index: 1.19      RIGHT VENTRICLE:  RV Basal 4.10 cm  RV Mid   2.40 cm  RV Major 7.8 cm  TAPSE:   19.2 mm  RV s'    0.11 m/s      TRICUSPID VALVE/RVSP:         Normal Ranges:  Est. RA Pressure:     3 mmHg  IVC Diam:             2.30 cm      PULMONIC VALVE:          Normal Ranges:  PV Accel Time:  112 msec (>120ms)  PV Max Boubacar:     0.8 m/s  (0.6-0.9m/s)  PV Max P.3 mmHg      PULMONARY VEINS:  PulmV A Revs Dur: 135.11 msec  PulmV A Revs Boubacar: 25.68 cm/s  PulmV Acevedo Boubacar:   52.83 cm/s  PulmV S/D Boubacar:    0.66  PulmV Sys Boubacar:    34.71 cm/s      AORTA:  Asc Ao Diam 3.00 cm      [id number] Tonio Baker DO  Electronically signed on 2025 at 9:47:49 AM        ** Final **      Results for orders placed during the hospital encounter of 10/15/23    Transthoracic Echo (TTE) Complete    Narrative  [facility], [address] [place] [address]  Tel [phone] and Fax [phone]    TRANSTHORACIC ECHOCARDIOGRAM REPORT      Patient Name:      MEGAN Uriarte Physician:    [id number] Tonio Baker DO  Study Date:        10/16/2023           Ordering Provider:    [id number] CARL B GILLOMBARDO  MRN/PID:           [id number]             Fellow:  Accession#:        [id number]         Nurse:  Date of Birth/Age: [birth year] / 43 years Sonographer:          Ray Heredia RDCS  Gender:            M                    Additional Staff:  Height:            167.00 cm             Admit Date:           10/15/2023  Weight:            117.00 kg            Admission Status:     Inpatient -  Routine  BSA:               2.22 m2              Encounter#:           [id number]  Department Location:  [facility] ICU  Blood Pressure: 98 /56 mmHg    Study Type:    TRANSTHORACIC ECHO (TTE) COMPLETE  Diagnosis/ICD: ST elevation (STEMI) myocardial infarction of unspecified  site-I21.3  Indication:    STEMI    Patient History:  Pertinent History: Acute MI.    Study Detail: The following Echo studies were performed: 2D, M-Mode, Doppler and  color flow. Technically challenging study due to body habitus.  Definity used as a contrast agent for endocardial border  definition. Total contrast used for this procedure was 4 mL via IV  push.      PHYSICIAN INTERPRETATION:  Left Ventricle: The left ventricular systolic function is low normal, with an estimated ejection fraction of 50-55%. Wall motion is abnormal. The left ventricular cavity size is normal. Spectral Doppler shows a normal pattern of left ventricular diastolic filling.  LV Wall Scoring:  The basal and mid inferior wall is hypokinetic. All remaining scored segments  are normal.    Left Atrium: The left atrium is normal in size.  Right Ventricle: The right ventricle is normal in size. There is normal right ventricular global systolic function.  Right Atrium: The right atrium is normal in size.  Aortic Valve: The aortic valve was not well visualized. There is no evidence of aortic valve regurgitation. The peak instantaneous gradient of the aortic valve is 5.2 mmHg. The mean gradient of the aortic valve is 3.0 mmHg.  Mitral Valve: The mitral valve is normal in structure. There is no evidence of mitral valve regurgitation.  Tricuspid Valve: The tricuspid valve is structurally normal. No evidence of tricuspid regurgitation.  Pulmonic Valve: The pulmonic valve is not well visualized. There is no indication of pulmonic valve regurgitation.  Pericardium: There is  no pericardial effusion noted.  Aorta: The aortic root is normal.  In comparison to the previous echocardiogram(s): There are no prior studies on this patient for comparison purposes.      CONCLUSIONS:  1. Left ventricular systolic function is low normal with a 50-55% estimated ejection fraction.  2. Basal and mid inferior wall is abnormal.  3. Poorly visualized anatomical structures due to suboptimal image quality.    QUANTITATIVE DATA SUMMARY:  2D MEASUREMENTS:  Normal Ranges:  LAs:           2.70 cm   (2.7-4.0cm)  IVSd:          0.90 cm   (0.6-1.1cm)  LVPWd:         1.00 cm   (0.6-1.1cm)  LVIDd:         4.70 cm   (3.9-5.9cm)  LVIDs:         3.10 cm  LV Mass Index: 74.0 g/m2  LV % FS        34.0 %    LA VOLUME:  Normal Ranges:  LA Vol A4C:        78.9 ml    (22+/-6mL/m2)  LA Vol A2C:        75.6 ml  LA Vol BP:         82.7 ml  LA Vol Index A4C:  35.5ml/m2  LA Vol Index A2C:  34.0 ml/m2  LA Vol Index BP:   37.2 ml/m2  LA Area A4C:       22.1 cm2  LA Area A2C:       20.2 cm2  LA Major Axis A4C: 5.3 cm  LA Major Axis A2C: 4.6 cm  LA Volume Index:   37.2 ml/m2    RA VOLUME BY A/L METHOD:  Normal Ranges:  RA Area A4C: 18.3 cm2    AORTA MEASUREMENTS:  Normal Ranges:  Ao Sinus, d: 2.97 cm (2.1-3.5cm)  Ao STJ, d:   2.66 cm (1.7-3.4cm)  Asc Ao, d:   2.78 cm (2.1-3.4cm)    LV SYSTOLIC FUNCTION BY 2D PLANIMETRY (MOD):  Normal Ranges:  EF-A4C View: 66.4 % (>=55%)  EF-A2C View: 63.7 %  EF-Biplane:  64.3 %    LV DIASTOLIC FUNCTION:  Normal Ranges:  MV Peak E:        0.76 m/s    (0.7-1.2 m/s)  MV Peak A:        0.51 m/s    (0.42-0.7 m/s)  E/A Ratio:        1.48        (1.0-2.2)  MV e'             0.09 m/s    (>8.0)  MV lateral e'     0.09 m/s  MV medial e'      0.09 m/s  MV A Dur:         103.00 msec  E/e' Ratio:       8.44        (<8.0)  PulmV Sys Boubacar:    54.40 cm/s  PulmV Acevedo Boubacar:   49.70 cm/s  PulmV S/D Boubacar:    1.10  PulmV A Revs Boubacar: 20.40 cm/s  PulmV A Revs Dur: 111.00 msec    MITRAL VALVE:  Normal Ranges:  MV DT: 129 msec  (150-240msec)    AORTIC VALVE:  Normal Ranges:  AoV Vmax:                1.14 m/s (<=1.7m/s)  AoV Peak P.2 mmHg (<20mmHg)  AoV Mean PG:             3.0 mmHg (1.7-11.5mmHg)  LVOT Max Boubacar:            1.00 m/s (<=1.1m/s)  AoV VTI:                 21.50 cm (18-25cm)  LVOT VTI:                18.20 cm  LVOT Diameter:           2.10 cm  (1.8-2.4cm)  AoV Area, VTI:           2.93 cm2 (2.5-5.5cm2)  AoV Area,Vmax:           3.04 cm2 (2.5-4.5cm2)  AoV Dimensionless Index: 0.85      RIGHT VENTRICLE:  RV Basal 4.36 cm  RV Mid   3.44 cm  RV Major 8.1 cm  TAPSE:   16.9 mm    TRICUSPID VALVE/RVSP:  Normal Ranges:  Est. RA Pressure: 3 mmHg  IVC Diam:         0.97 cm    PULMONIC VALVE:  Normal Ranges:  PV Accel Time: 151 msec (>120ms)  PV Max Boubacar:    0.9 m/s  (0.6-0.9m/s)  PV Max PG:     3.3 mmHg    Pulmonary Veins:  PulmV A Revs Dur: 111.00 msec  PulmV A Revs Boubacar: 20.40 cm/s  PulmV Acevedo Boubacar:   49.70 cm/s  PulmV S/D Boubacar:    1.10  PulmV Sys Boubacar:    54.40 cm/s      [id number] Tonio Baker DO  Electronically signed on 10/16/2023 at 12:31:59 PM      Wall Scoring        ** Final (Updated) **    Ejection Fractions:  LV EF   Date/Time Value Ref Range Status   2025 10:59 AM 28 %      Cath:  Results for orders placed during the hospital encounter of 10/15/23    Cardiac catheterization - coronary    Narrative  [facility], Cath Lab, [address] [place] [address]    Cardiovascular Catheterization Report    Patient Name:      MEGAN CORTES            Performing Physician:  [id number] Hall MD  Study Date:        10/15/2023           Verifying Physician:   [id number] Hall MD  MRN/PID:           [id number]             Cardiologist/Co-scrub:  Accession#:        [id number]         Ordering Provider:     MACK INVASIVE  CARDIOLOGIST  REED  Date of Birth/Age: [birth year] / 43 years Fellow:                [id number] Carl Gillombardo MD  Gender:            M                    Fellow:  Encounter#:         [id number]      Study:            Left Heart Cath  Additional Study: PCI - Percutaneous Coronary Intervention      Indications:  MEGAN CORTES is a 44 year old male who presents with coronary artery disease, dyslipidemia, hypertension, obesity, Active smoker and a chest pain assessment of typical angina. Acute coronary syndrome - STEMI.  Medical History:  Stress test performed: No. CTA performed: NoHawa Lanette accessed: No. LVEF  Assessed: No.  Cardiac arrest: No.  Cardiac surgical consult: No.  Cardiovascular instability: No.  Frailty status of patient entering lab: 7 = Severely frail.      Procedure Description:  After infiltration with 1% Lidocaine, the right radial artery was cannulated with a modified Seldinger technique. Subsequently a 6 Marshallese sheath was placed retrograde in the right radial artery. The arterial sheath was sized up to 6 Marshallese. Selective coronary catheterization was performed using a 6 Fr and 5 Fr catheter(s) exchanged over a guide wire to cannulate the coronary arteries. A JL 3.5 tip catheter was used for left coronary injections. A JR 4 tip catheter was used for right coronary injections.  Multiple injections of contrast were made into the left and right coronary arteries with angiograms recorded in multiple projections.    Coronary Angiography:  The coronary circulation is right dominant.    Left Main Coronary Artery:  The left main coronary artery is a normal caliber vessel. The left main bifurcates into the LAD and circumflex coronary arteries and gives rise to the ramus intermedius artery. The left main coronary artery showed moderate atherosclerotic disease. The distal left main coronary artery showed 40%.    Left Anterior Descending Coronary Artery Distribution:  The left anterior descending coronary artery is a normal caliber vessel. The LAD arises normally from the left main coronary artery. The LAD demonstrated moderate atherosclerotic disease. The proximal to mid left anterior  descending coronary artery showed 50% stenosis. This lesion was long. The 1st diagonal branch showed no significant disease or stenosis greater than 30%. The 2nd diagonal branch demonstrated no significant disease or stenosis greater than 30%. The 3rd diagonal branch revealed no significant disease or stenosis greater than 30%.    Circumflex Coronary Artery Distribution:  The circumflex coronary artery is a normal caliber vessel. The circumflex arises normally from the left main coronary artery and terminates in the AV groove. The circumflex revealed no significant disease or stenosis greater than 30%. The 1st obtuse marginal branch showed no significant disease or stenosis greater than 30%. The 2nd obtuse marginal branch demonstrated no significant disease or stenosis greater than 30%.    Ramus Intermedius:  The ramus intermedius is a large caliber vessel. The mid ramus intermedius showed 50-60%.    Right Coronary Artery Distribution:    The right coronary artery is a normal caliber vessel. The RCA arises normally from the right sinus of Valsalva. The RCA showed total thrombotic occlusion. The mid right coronary artery showed 100% stenosis. The acute marginal branch showed no significant disease or stenosis greater than 30%.  The right posterolateral branch showed no significant disease or stenosis greater than 30%. The right posterior descending artery showed no significant disease or stenosis greater than 30%.    Coronary Interventions:  Angiography reveals a 100% stenosis of the mid right coronary artery coronary artery. Pre-intervention PANCHO flow was 0. Percutaneous coronary intervention was performed within the mid right coronary artery. The vessel was pre-dilated using a compliant balloon 2.5 mm x 12 mm at 12 CASPER. BRICE FRONTIER EMILY 3.0 mm x 22 mm was advanced to the lesion and implanted at 12 CASPER. The stenosis was successfully reduced from 100% to 0%. Post-intervention PANCHO flow was 3. Chest pain resolved  before the end of the case.    Coronary Lesion Summary:  Vessel      Stenosis    Vessel Segment  Left Main      40%          distal  LAD       50% stenosis  proximal to mid  Ramus        50-60%           mid  RCA       100% stenosis       mid      Hemo Personnel:  +----------------+---------+  Name            Duty       +----------------+---------+  Juan Manuel Hall MD 1  +----------------+---------+      Hemodynamic Pressures:    +----+---------------+----------+-------------+--------------+-------+---------+  Site   Date Time   Phase NameSystolic mmHgDiastolic mmHgED mmHgMean mmHg  +----+---------------+----------+-------------+--------------+-------+---------+    AO     10/15/2023  AIR REST          160            98             126           7:17:14 PM                                                       +----+---------------+----------+-------------+--------------+-------+---------+    AO     10/15/2023  AIR REST          146            96             121           7:18:24 PM                                                       +----+---------------+----------+-------------+--------------+-------+---------+    AO     10/15/2023  AIR REST          156           103             127           7:26:50 PM                                                       +----+---------------+----------+-------------+--------------+-------+---------+    LV     10/15/2023  AIR REST          162            -1     38                    7:39:10 PM                                                       +----+---------------+----------+-------------+--------------+-------+---------+    LV     10/15/2023  AIR REST          158            -1     33                    7:39:19 PM                                                       +----+---------------+----------+-------------+--------------+-------+---------+   LVp     10/15/2023  AIR  REST          158             0     32                    7:39:24 PM                                                       +----+---------------+----------+-------------+--------------+-------+---------+   AO     10/15/2023  AIR REST          141            88             111           7:39:32 PM                                                       +----+---------------+----------+-------------+--------------+-------+---------+      Cardiac Cath Post Procedure Notes:  Post Procedure Diagnosis: STEMI, RCA culprit.  Blood Loss:               Estimated blood loss during the procedure was 2 mls.  Specimens Removed:        Number of specimen(s) removed: none.      Recommendations:  Maximize medical therapy.  Agressive risk factor modification efforts.  Telemetry monitoring.  Follow-up with cardiology clinic.  Follow-up with primary care physician.  Monitor vitals and arterial access site/pulses.  Prompt evaluation for recurrent chest pain.  Anti-platelet therapy with Aspirin and Ticagrelor 90mgs BID.  Patient counseled and advised to discontinue smoking.  Consider referral to cardiac rehabilitation.    ____________________________________________________________________________________  CONCLUSIONS:  1. Inferior STEMI, now s/p PCI to mid RCA with 3.0 x 22 BRICE FRONTIER EMILY.  2. Right dominant coronary circulation, mid RCA culprit.  3. Moderate non-obstructive CAD elsewhere including distal left main, as described above.  4. Left Ventricular end-diastolic pressure = 38.    ICD 10 Codes:  ST elevation (STEMI) myocardial infarction involving right coronary artery-I21.11    CPT Codes:  Revasc during AMI stent/angio/atherc,ins asp Thrombectomy, Right Coronary single Vessel (PCI)-50468.RC; Moderate Sedation Services initial 15 minutes patient >5 years-00921; Moderate Sedation Services 8th additional 15 minutes patient >5 years-11627; Moderate Sedation Services 7th additional 15 minutes patient >5  "years-53229; Left Heart Cath (visualization of coronaries) and LV-22974    [id number] Juan Manuel Hall MD  Performing Physician  Electronically signed by [id number] Juan Manuel Hall MD on 10/17/2023 at 9:54:41 AM          ** Final **    Stress Test:  No results found for this or any previous visit.    Cardiac Imaging:  No results found for this or any previous visit.      Inpatient Medications:  Scheduled Medications[1]  PRN Medications[2]  Continuous Medications[3]    Physical Exam:  General:  Patient is awake, alert, and oriented.  Patient is in no acute distress.  HEENT:  Pupils equal and reactive.  Normocephalic.  Moist mucosa.    Neck:  No thyromegaly.  Normal Jugular Venous Pressure.  Cardiovascular:  Regular rate and rhythm.  Normal S1 and S2.  Pulmonary:  Clear to auscultation bilaterally.  Abdomen:  Soft. Non-tender.   Non-distended.  Positive bowel sounds.  Lower Extremities:  2+ pedal pulses. No LE edema.  Neurologic:  Cranial nerves intact.  No focal deficit.   Skin: Skin warm and dry, normal skin turgor.   Psychiatric: Normal affect.     Assessment/Plan   Cards: Samuel 10/25/23     Francisco May is a 45 y.o. male with PMH of prior DVT/PE in 2019(provoked 6 months therapy), smoker, premature family history of CAD, STEMI 10/2023 with EMILY x1 to RCA who now presents to [facility] with chest pain.  Cardiology is consulted for \"chest pain\"    4/29/25 [facility]  Right Radial 6 Fr -- TR band  Severe 2 vessel and branch vessel CAD in a right dominant system.  Successful OCT guided PCI of the RPDA with mechanical thrombectomy  Patent mid RCA stent  Integrilin bolus x 2 --> please continue Integrilin gtt for 6 more hours  Loaded with ASA and Prasugrel    TTE 4/2025  1. The left ventricular systolic function is severely decreased, with a visually estimated ejection fraction of 25-30%.   2. There is global hypokinesis of the left ventricle with minor regional variations.   3. Spectral Doppler shows a Grade II (pseudonormal pattern) of left " ventricular diastolic filling with an elevated left atrial pressure.   4. There is normal right ventricular global systolic function.   5. Compared with study dated 10/16/2023, left ventricular function is now severely decreased.         #NSTEMI  - s/p [facility] 4/28 severe multi vessel dx  #ICM HEFrEF 25-30%  #Diabetes type 2-untreated  #Dyslipidemia  #Current smoker  #Medication noncompliance  #Hypokalemia-resolved      RECS:  -Severe three-vessel disease noted including mid 70 to 80% LAD, LCx and ramus disease and thrombotic distal RCA into PDA. PDA felt to be culprit lesion. After discussion with Dr. Vargas decided to proceed with thrombectomy and possible stenting. With aspiration thrombectomy lesion completely resolved and OCT showed no heavy atherosclerotic burden in the culprit area. Due to concern regarding compliance with medications and good thrombectomy result we decided to stop there and treat with medical therapy for now.   -Will follow-up with CT surgery as outpatient-> has an appointment scheduled June t4th with Dr. Milvia Denis  -c/w DAPT with Prasugrel and ASA  -c/w Lipitor 80mg daily  -GDMT with Aldactone 25mg daily, metoprolol succinate 50 mg daily, losartan 12.5 mg daily and Jardiance 10mg daily   - No cardiac barriers to discharge  - Will request appointment with Dr. Tonio Baker in 3 to 4 weeks          Code Status:  Full Code    I spent 30 minutes in the professional and overall care of this patient.        JIMI Hall-CNP    STAFF ADDENDUM:    Both the MEL and I have had a face to face encounter with the patient today. I have examined the patient and edited the documented physical examination as necessary.  I personally reviewed the patient's vital signs, telemetry, recent labs, medications, orders, EKGs, and pertinent cardiac imaging/ echocardiography.  I have reviewed the MEL's encounter note, approve the MEL's documentation and have edited the note to reflect my diagnostic and  therapeutic plan.      Feeling much better today.  No recurrent chest pain since aspiration thrombectomy.  Tolerating aspirin and prasugrel.  He is now on all 4 classes of goal-directed medical therapy and continue doses as above.  He is stable for discharge today and I will follow-up with him in a couple of weeks and then he is scheduled to see cardiothoracic surgery in about 1 month.  Advised probably okay for work but with light duty, ideally may be best to not overexert himself until surgery can be completed.        Tonio Baker DO           [1]   Scheduled medications   Medication Dose Route Frequency    aspirin  81 mg oral Daily    atorvastatin  80 mg oral Nightly    dapagliflozin propanediol  10 mg oral Daily    insulin lispro  0-15 Units subcutaneous TID    insulin NPH (Isophane)  10 Units subcutaneous q12h DIANE    losartan  12.5 mg oral Daily    metoprolol succinate XL  50 mg oral Daily    oxygen   inhalation Continuous - 02/gases    pantoprazole  20 mg oral Daily before breakfast    perflutren protein A microsphere  0.5 mL intravenous Once in imaging    prasugrel  10 mg oral Daily    sennosides  2 tablet oral BID    spironolactone  25 mg oral Daily    sulfur hexafluoride microsphr  2 mL intravenous Once in imaging   [2]   PRN medications   Medication    acetaminophen    dextrose    dextrose    glucagon    glucagon    melatonin    nitroglycerin    ondansetron ODT    Or    ondansetron   [3]   Continuous Medications   Medication Dose Last Rate

## 2025-04-30 NOTE — DISCHARGE SUMMARY
Discharge Diagnosis  NSTEMI (non-ST elevated myocardial infarction) (Multi)           Issues Requiring Follow-Up  Follow-up with PCP and cardiology    Discharge Meds     Medication List      START taking these medications     aspirin 81 mg EC tablet; Take 1 tablet (81 mg) by mouth once daily.;   Start taking on: May 1, 2025   dapagliflozin propanediol 10 mg tablet; Commonly known as: Farxiga; Take   1 tablet (10 mg) by mouth once daily.; Start taking on: May 1, 2025   insulin NPH (Isophane) 100 unit/mL injection; Commonly known as: HumuLIN   N,NovoLIN N; Inject 10 Units under the skin every 12 hours. Take as   directed per insulin instructions.   losartan 25 mg tablet; Commonly known as: Cozaar; Take 0.5 tablets (12.5   mg) by mouth once daily.; Start taking on: May 1, 2025   metoprolol succinate XL 50 mg 24 hr tablet; Commonly known as:   Toprol-XL; Take 1 tablet (50 mg) by mouth once daily. Do not crush or   chew.; Start taking on: May 1, 2025   nitroglycerin 0.4 mg SL tablet; Commonly known as: Nitrostat; Place 1   tablet (0.4 mg) under the tongue every 5 minutes if needed for chest pain.   prasugrel 10 mg tablet; Commonly known as: Effient; Take 1 tablet (10   mg) by mouth once daily   spironolactone 25 mg tablet; Commonly known as: Aldactone; Take 1 tablet   (25 mg) by mouth once daily.; Start taking on: May 1, 2025     STOP taking these medications     atorvastatin 80 mg tablet; Commonly known as: Lipitor   metoprolol tartrate 25 mg tablet; Commonly known as: Lopressor   omeprazole 20 mg DR capsule; Commonly known as: PriLOSEC       Test Results Pending At Discharge  Pending Labs       Order Current Status    Extra Urine Gray Tube Collected (04/27/25 2208)    Urinalysis with Reflex Culture and Microscopic In process            Hospital Course   Francisco May is a 45 y.o. male with PMH of inferior STEMI 10/16/23, DVT/PE 2019 (provoked by occupation as ), presenting with chest pain.     Chest pain woke him  "from sleep today. Is located in the center of his chest with radiation straight through to the back. Pain described as \"like an air bubble is stuck in there\", is constant.   + SOB, diaphoresis.   Denies associated nausea.   Still having CP. Nitropaste ordered, about to be applied.   In the ED, ECG showed no ST elevation. Posterior ECG then performed (ECG timed 21:18) due to hx of RCA stent/occlusion, which showed no ST elevation.   Troponin 12,213 ->13,349  Heparin gtt started.      Mr May underwent C 10/16/23, PCI to RCA for 100% occlusion. Echo at that time showed LVEF 50-55%. Pt was discharged home on DAPT ASA/Brilinta, metoprolol 25mg BID and atorvastatin 80mg daily.   His HbA1c was 7.5 per the discharge summary, however, no mention made regarding DM.   He followed up with Dr Baker 10/25/23, at which time he was diagnosed with new onset DM, however, per that note he wanted to dry diet and exercise.      Mr Mya tells me that he did not take any of the prescribed medications, not even OTC baby aspirin since his LHC due to lack of medical insurance.   He smokes 1 PPD x 10 yrs.     Patient was admitted for further management of his NSTEMI given elevation of troponin and history of CAD s/p EMILY x1 stent to RCA, residual moderate atherosclerotic disease in LAD and ramus.    TTE during this admission showed left ventricular systolic function severely decreased with visually estimated EF of 25 to 30%, there were global hypokinesis of the left ventricle with minor regional variation.  In comparison to TTE of October 2023 left ventricular function is not severely decreased.  Cardiology was consulted, he was started on metoprolol 25 mg twice daily, aspirin 81 mg daily atorvastatin 80 mg daily kept on a heparin drip due to elevation in troponin, underwent left heart catheterization which showed severe two-vessel and branch vessel CAD in the right dominant system.  Underwent successful OCT guided PCI to the RPDA with " "mechanical thrombectomy.  Mid RCA stent was patent.  Patient received Integrilin bolus x 2, and continued drip for 6 more hours before being loaded with aspirin and prasugrel.  Given severity of vessel disease, CT surgery was consulted, and patient to follow-up as outpatient, with schedule appointment for June 14 with Dr. Milvia Denis.  Patient was started on Aldactone 25 mg daily, metoprolol succinate 50 mg, losartan 12.5 mg daily and Jardiance 10 mg daily with prescription provided at discharge as med to bed.  On admission, patient was found hyperglycemic, which correlated with history of diabetes, although patient stated that he is unaware of such a diagnosis, was already told that he is \"borderline.\"  Endocrinology was consulted, he was started on NPH 10 unit twice daily during hospitalization hyperglycemia improved, nutritionist was consulted for education as well as diabetes educator participated in reinforcing his understanding of his diagnosis and goal of care for improvement and avoidance of complications down the road.  Patient will discharge on mixed insulin 75/25 12 units twice a day, prescription sent to pharmacy as med to bed with pharmacy to provide BD needles at delivery  During morning encounters, lengthy discussion also involved management of his diabetes, he was educated on healthy eating habits, benefit of routine exercise, weight loss, smoke cessation, and stress management in general.  Patient expressed verbal understanding, and promised to work on making changes to improve his health.  Insurance coverage was an issue as well, and stated that he will work with his employer to acquire insurance.    On April 30, 2025, patient was found stable for discharge home with follow-up with PCP, cardiology (Dr. Baker) and cardiothoracic (Dr. Denis)    Greater than 30 minutes were dedicated to this discharge that included lengthy education, and coordinating care with TCC.    Pertinent Physical Exam At Time " of Discharge  Physical Exam  Constitutional: Pleasant, obese gentleman, alert active, cooperative not in acute distress  Eyes: PERRLA, clear sclera  ENMT: Moist mucosal membranes, no exudate  Head / Neck: Atraumatic, normocephalic, supple neck, JVP not visualized  Lungs: Patent airways, CTABL  Heart: RRR, S1S2, no murmurs appreciated, palpable pulses in all extremities  GI: Soft, NT, ND, bowel sounds present in all quadrants  MSK: Moves all extremities freely, no restriction  of ROM, no joint edema  Extremities: Intact x 4, no peripheral edema  : No Terrazas catheter inserted  Breast: Deferred  Neurological: AAO x 3 to person, place and date, facial muscles symmetrical, sensation intact, strength 4/4, no acute focal neurological deficits appreciated  Psychological: Appropriate mood and behavior    Outpatient Follow-Up  Future Appointments   Date Time Provider Department Center   5/14/2025 11:20 AM Tonio Baker DO [id number] [facility]   6/4/2025  3:15 PM Milvia Denis MD [id number] [facility]         Sladerob Renee DO

## 2025-04-30 NOTE — NURSING NOTE
Labs not done this morning, pt doesn't want to be pricked could not draw from the IV line , has no draw back but flushes.

## 2025-04-30 NOTE — CARE PLAN
The patient's goals for the shift include      The clinical goals for the shift include remain hd stable    Over the shift, the patient did not make progress toward the following goals. Barriers to progression include . Recommendations to address these barriers include   Problem: Pain - Adult  Goal: Verbalizes/displays adequate comfort level or baseline comfort level  Outcome: Progressing     Problem: Safety - Adult  Goal: Free from fall injury  Outcome: Progressing     Problem: Discharge Planning  Goal: Discharge to home or other facility with appropriate resources  Outcome: Progressing     Problem: Nutrition  Goal: Nutrient intake appropriate for maintaining nutritional needs  Outcome: Progressing     Problem: Pain  Goal: Takes deep breaths with improved pain control throughout the shift  Outcome: Progressing  Goal: Turns in bed with improved pain control throughout the shift  Outcome: Progressing  Goal: Walks with improved pain control throughout the shift  Outcome: Progressing  Goal: Performs ADL's with improved pain control throughout shift  Outcome: Progressing  Goal: Participates in PT with improved pain control throughout the shift  Outcome: Progressing  Goal: Free from opioid side effects throughout the shift  Outcome: Progressing  Goal: Free from acute confusion related to pain meds throughout the shift  Outcome: Progressing   .

## 2025-04-30 NOTE — NURSING NOTE
Francisco May is a 45 y.o. male on day 3 of admission presenting with NSTEMI (non-ST elevated myocardial infarction) (Multi).     Reviewed insulin pen administration with pt and wife Jigna.  Pt states he lives with sister, who is also a IDDM and uses pens.  We discussed injections should be every 12 hours; this maybe different now than when pt is back working  Advised pt should eat prior to injections.  Advised to hold injection if BG is <100mg/dl.   Encouraged to monitor BG. Ideally 2x/day -prior to insulin administration.   Reviewed S/S of hypoglycemia and management with 15/15 rule.   Pt will  BG monitor and supplies at [facility].  Encouraged medication and DM care compliance.  Encouraged pt to establish care with PCP and endocrinology.  Follow up appointment with CTS in June RE: possible CABG.  Encouraged adherence to diabetic diet.    All questions answered.    Thank you.    Dejuan Cuevas RN

## 2025-04-30 NOTE — PROGRESS NOTES
"Francisco May is a 45 y.o. male on day 3 of admission presenting with NSTEMI (non-ST elevated myocardial infarction) (Multi).    Subjective   Patient state he does not know his cardiac plan and snapped at me that he does not know what that question means.  He also is threatening to leave against medical advice if he is not told when he will be discharged.    Wife is more aware of plan and planning outpatient CT surgery consult.      Appetite is good  Chest pain improved     Scheduled medications  Scheduled Medications[1]  Continuous medications  Continuous Medications[2]  PRN medications  PRN Medications[3]      Objective   Physical Exam  Constitutional:       General: He is not in acute distress.  Neurological:      Mental Status: He is alert.         Last Recorded Vitals  Blood pressure 95/58, pulse 82, temperature 36.2 °C (97.2 °F), temperature source Temporal, resp. rate 17, height 1.676 m (5' 5.98\"), weight 111 kg (244 lb 11.4 oz), SpO2 98%.  Intake/Output last 3 Shifts:  I/O last 3 completed shifts:  In: 582.8 (5.3 mL/kg) [P.O.:300; I.V.:282.8 (2.5 mL/kg)]  Out: 1050 (9.5 mL/kg) [Urine:1050 (0.3 mL/kg/hr)]  Weight: 111 kg     Relevant Results  Results from last 7 days   Lab Units 04/29/25  1943 04/29/25  1517 04/29/25  1128 04/29/25  0931 04/29/25  0821 04/29/25  0436 04/28/25  1503 04/28/25  1129 04/28/25  0559 04/28/25  0051 04/27/25 1940   POCT GLUCOSE mg/dL 193* 270* 149*  --  348* 285*   < >  --   --    < >  --    GLUCOSE mg/dL  --   --   --  359*  --   --   --  287* 223*  --  335*    < > = values in this interval not displayed.      Latest Reference Range & Units 04/29/25 09:31   GLUCOSE 74 - 99 mg/dL 359 (H)   SODIUM 136 - 145 mmol/L 131 (L)   POTASSIUM 3.5 - 5.3 mmol/L 3.8   CHLORIDE 98 - 107 mmol/L 103   Bicarbonate 21 - 32 mmol/L 19 (L)   Anion Gap 10 - 20 mmol/L 13   Blood Urea Nitrogen 6 - 23 mg/dL 11   Creatinine 0.50 - 1.30 mg/dL 0.65   EGFR >60 mL/min/1.73m*2 >90   Calcium 8.6 - 10.3 mg/dL 8.8 "       Assessment & Plan  NSTEMI (non-ST elevated myocardial infarction) (Multi)    IMPRESSION  TYPE 2 DIABETES MELLITUS WITH HYPERGLYCEMIA   Complicated by CAD  A1c reflects uncontrolled hyperglycemia  Patient does not acknowledge prior diagnosis of diabetes in 2023  Glucose improved  '  RECOMMENDATIONS  NPH 10 units subcutaneous BID today  Insulin  lispro scale for now  He will need insulin at discharge, recommend Humalog Mix 75/25 or Novolog Mix 70/30, 12 units BID before breakfast and dinner  Appreciate Diabetes education  Discussed Farxiga, SGLT2-inhibitor, for cardiac indication.  Advised side effects including frequent urination, risk of urinary infections and rare risk of necrotizing genital infections.  Advised to hold Farxiga at least 3 days before surgery due to risk of post-op DKA.        Alexandro Maurer MD         [1] aspirin, 81 mg, oral, Daily  atorvastatin, 80 mg, oral, Nightly  dapagliflozin propanediol, 10 mg, oral, Daily  insulin lispro, 0-15 Units, subcutaneous, TID  insulin NPH (Isophane), 10 Units, subcutaneous, q12h DIANE  losartan, 12.5 mg, oral, Daily  metoprolol succinate XL, 50 mg, oral, Daily  oxygen, , inhalation, Continuous - 02/gases  pantoprazole, 20 mg, oral, Daily before breakfast  perflutren protein A microsphere, 0.5 mL, intravenous, Once in imaging  prasugrel, 10 mg, oral, Daily  sennosides, 2 tablet, oral, BID  spironolactone, 25 mg, oral, Daily  sulfur hexafluoride microsphr, 2 mL, intravenous, Once in imaging  [2]    [3] PRN medications: acetaminophen, dextrose, dextrose, glucagon, glucagon, melatonin, nitroglycerin, ondansetron ODT **OR** ondansetron

## 2025-05-01 ENCOUNTER — TELEPHONE (OUTPATIENT)
Dept: CARDIOLOGY | Facility: CLINIC | Age: 45
End: 2025-05-01

## 2025-05-01 NOTE — TELEPHONE ENCOUNTER
TCT patient who states he woke up this morning with right eye swelling and redness. States the 'white part of my eye is red'. Denies any vision changes or trauma/discomfort. Explained would review with Dr. Baker and call him back with recommendations. States understanding and agreement.  Reviewed with Dr. Baker who recommends warm compresses and if any change in vision, present to ER. TCT patient and explained same. States understanding and agreement.

## 2025-05-05 ENCOUNTER — TELEPHONE (OUTPATIENT)
Dept: CARDIOLOGY | Facility: CLINIC | Age: 45
End: 2025-05-05

## 2025-05-05 DIAGNOSIS — I21.4 NSTEMI (NON-ST ELEVATED MYOCARDIAL INFARCTION) (MULTI): Primary | ICD-10-CM

## 2025-05-05 LAB
ATRIAL RATE: 83 BPM
P AXIS: 25 DEGREES
P OFFSET: 200 MS
P ONSET: 150 MS
PR INTERVAL: 132 MS
Q ONSET: 216 MS
QRS COUNT: 13 BEATS
QRS DURATION: 86 MS
QT INTERVAL: 408 MS
QTC CALCULATION(BAZETT): 479 MS
QTC FREDERICIA: 454 MS
R AXIS: 54 DEGREES
T AXIS: -38 DEGREES
T OFFSET: 420 MS
VENTRICULAR RATE: 83 BPM

## 2025-05-05 RX ORDER — IBUPROFEN 200 MG
1 TABLET ORAL EVERY 24 HOURS
Qty: 30 PATCH | Refills: 0 | Status: SHIPPED | OUTPATIENT
Start: 2025-05-05 | End: 2025-06-04

## 2025-05-05 NOTE — TELEPHONE ENCOUNTER
TCT wife who states patient isn't motivated to do anything including phone calls. States he's taking meds as prescribed. Requests prescription for help with smoking cessation. Explained would review with Dr. Baker but likely a result of poor heart function and several new medications. Has FUV scheduled for next week. Discussed with Dr. Baker and script for nicotine patches pended.

## 2025-05-05 NOTE — TELEPHONE ENCOUNTER
Patient wife called to inform pt. Still not doing well not sure what to do she can be reached on cell. Complaints of being drained no energy.

## 2025-05-13 NOTE — PROGRESS NOTES
Chief Complaint:   Hospital follow up     History Of Present Illness:    Francisco May is a 45 y.o. male History of prior DVT/PE in 2019, smoker, premature family history of CAD, STEMI 10/2023 with EMILY x1 to RCA    Here for hospital follow-up after stemi  Presented with several days of chest pain that he initially thought was indigestion.  The night prior to admission significant worsening of symptoms while driving.  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  Has not returned to work waiting on us.    HgA1c came back 7.5%    Of note he says DVT/PE in 2019 felt to be provoked due to his occupation.  He regularly would not get out of Inventalator and walk around.  He took Eliquis for about 6 months.    EKG 4/28/25 Normal sinus rhythm     Cath 4/28/25  1. Successful OCT guided PCI of the distal RCA/ostial RPDA with mechanical thrombectomy.   2. Severe multivessel CAD in a right dominant system.   3. Elevated LVEDP.   4. No evidence of aortic stenosis.     I  CARDIAC CATH 10/15/23:  1. Inferior STEMI, now s/p PCI to mid RCA with 3.0 x 22 BRICE FRONTIER EMILY.   2. Right dominant coronary circulation, mid RCA culprit.   3. Moderate non-obstructive CAD elsewhere including distal left main (40%), Prox-mid LAD (50%), ramus (50%).   4. Left Ventricular end-diastolic pressure = 38.    Echo 4/28/25   1. The left ventricular systolic function is severely decreased, with a visually estimated ejection fraction of 25-30%.     ECHO 10/15/23:  1. Left ventricular systolic function is low normal with a 50-55% estimated ejection fraction.   2. Basal and mid inferior wall is abnormal.       Last Recorded Vitals:  There were no vitals filed for this visit.      Past Medical History:  See HPI    Past Surgical History:  He has a past surgical history that includes Cardiac catheterization (N/A, 10/15/2023); Cardiac catheterization (N/A, 10/15/2023); and Cardiac  "catheterization (N/A, 4/28/2025).      Social History:  He reports that he has quit smoking. His smoking use included cigarettes. He has a 12 pack-year smoking history. He does not have any smokeless tobacco history on file. He reports that he does not currently use alcohol. He reports that he does not use drugs.    Family History:  Family History   Problem Relation Name Age of Onset    Heart attack Mother      Heart attack Father          Allergies:  Amoxicillin    Outpatient Medications:  Current Outpatient Medications   Medication Instructions    aspirin 81 mg, oral, Daily    atorvastatin (LIPITOR) 80 mg, oral, Nightly    Farxiga 10 mg, oral, Daily    insulin lispro protamin-lispro (HumaLOG Mix 75-25 KwikPen) 100 unit/mL (75-25) pen Inject 12 Units under the skin 2 times a day as directed    losartan (COZAAR) 12.5 mg, oral, Daily    metoprolol succinate XL (TOPROL-XL) 50 mg, oral, Daily, Do not crush or chew.    nicotine (Nicoderm CQ) 14 mg/24 hr patch 1 patch, transdermal, Every 24 hours    nitroglycerin (NITROSTAT) 0.4 mg, sublingual, Every 5 min PRN    pen needle, diabetic (Pen Needle) 32 gauge x 5/32\" needle Use to inject insulin twice daily as directed. Use a new pen needle with each injection.    prasugrel (Effient) 10 mg tablet Take 1 tablet (10 mg) by mouth once daily    spironolactone (ALDACTONE) 25 mg, oral, Daily       Physical Exam:  GENERAL: alert, cooperative, pleasant, in no acute distress  SKIN: warm, dry, no rash.  NECK: no JVD, no RANDALL  CARDIAC: Regular rate and rhythm with no rubs, murmurs, or gallops  CHEST: Normal respiratory efforts, lungs clear to auscultation bilaterally.  ABDOMEN: soft, nontender, nondistended  EXTREMITIES: no edema  NEURO: Alert and oriented x 3.  Grossly normal.  Moves all 4 extremities.       Last Labs:  CBC -  Lab Results   Component Value Date    WBC 13.8 (H) 04/28/2025    HGB 15.4 04/28/2025    HCT 44.2 04/28/2025    MCV 86 04/28/2025     04/28/2025       CMP " -  Lab Results   Component Value Date    CALCIUM 8.8 04/29/2025    PHOS 3.0 10/16/2023    PROT 7.2 04/27/2025    ALBUMIN 4.2 04/27/2025    AST 81 (H) 04/27/2025    ALT 23 04/27/2025    ALKPHOS 85 04/27/2025    BILITOT 1.0 04/27/2025       LIPID PANEL -   Lab Results   Component Value Date    CHOL 135 10/16/2023    TRIG 362 (H) 10/16/2023    HDL 22.8 10/16/2023    CHHDL 5.9 10/16/2023    VLDL 72 (H) 10/16/2023    NHDL 112 10/16/2023       RENAL FUNCTION PANEL -   Lab Results   Component Value Date    GLUCOSE 359 (H) 04/29/2025     (L) 04/29/2025    K 3.8 04/29/2025     04/29/2025    CO2 19 (L) 04/29/2025    ANIONGAP 13 04/29/2025    BUN 11 04/29/2025    CREATININE 0.65 04/29/2025    CALCIUM 8.8 04/29/2025    PHOS 3.0 10/16/2023    ALBUMIN 4.2 04/27/2025        Lab Results   Component Value Date    BNP 87 10/16/2023    HGBA1C 11.3 (H) 04/27/2025         Assessment/Plan   45 y.o. male History of prior DVT/PE in 2019, former smoker, premature family history of CAD, STEMI 10/2023 with EMILY x1 to RCA    #CAD  -asa, statin, brillinta, metoprolol  -Residual moderate atherosclerotic disease in LAD and ramus.  - Goal LDL less than 70.  Repeat lipids in about 3 months  -Referred to cardiac rehab    #DM2 - new dx  -he wants to try diet and exercise  -repeat A1c in 3 months.   -Needs to establish with primary care provider    #tobacco use  -now quit    #h/o DVT/PE - felt to be provoked. Completed 6 months therapy    Follow up 3 months     Bridget Hatchett, MAPrimary Care Physician: No Assigned PCP Generic Provider, MD  Date of Visit: 05/14/2025 11:20 AM EDT  Location of visit: [facility] [address]     Chief Complaint:   ***     HPI / Summary:   Francisco May is a 45 y.o. male presents for followup. ***      Last Cardiology Tests:  ECG:      Echo:      Cath:      Stress Test:      Cardiac Imaging:    Past Medical History:  Medical History[1]     Past Surgical History:  Surgical History[2]       Social History:  He reports that  "he has quit smoking. His smoking use included cigarettes. He has a 12 pack-year smoking history. He does not have any smokeless tobacco history on file. He reports that he does not currently use alcohol. He reports that he does not use drugs.    Family History:  family history includes Heart attack in his father and mother.      Allergies:  RX Allergies[3]    Outpatient Medications:  Current Outpatient Medications   Medication Instructions    aspirin 81 mg, oral, Daily    atorvastatin (LIPITOR) 80 mg, oral, Nightly    Farxiga 10 mg, oral, Daily    insulin lispro protamin-lispro (HumaLOG Mix 75-25 KwikPen) 100 unit/mL (75-25) pen Inject 12 Units under the skin 2 times a day as directed    losartan (COZAAR) 12.5 mg, oral, Daily    metoprolol succinate XL (TOPROL-XL) 50 mg, oral, Daily, Do not crush or chew.    nicotine (Nicoderm CQ) 14 mg/24 hr patch 1 patch, transdermal, Every 24 hours    nitroglycerin (NITROSTAT) 0.4 mg, sublingual, Every 5 min PRN    pen needle, diabetic (Pen Needle) 32 gauge x 5/32\" needle Use to inject insulin twice daily as directed. Use a new pen needle with each injection.    prasugrel (Effient) 10 mg tablet Take 1 tablet (10 mg) by mouth once daily    spironolactone (ALDACTONE) 25 mg, oral, Daily       Physical Exam:  ***  There were no vitals filed for this visit.  Wt Readings from Last 5 Encounters:   04/28/25 111 kg (244 lb 11.4 oz)   10/25/23 117 kg (257 lb 9.6 oz)   10/15/23 117 kg (257 lb 15 oz)     There is no height or weight on file to calculate BMI.        Last Labs:  CMP:  Recent Labs     04/29/25  0931 04/28/25  1501 04/28/25  1129 04/28/25  0559   *  --  133* 138   K 3.8 4.4 4.5 2.9*     --  104 114*   CO2 19*  --  25 20*   ANIONGAP 13  --  9* 7*   BUN 11  --  10 8   CREATININE 0.65  --  0.75 0.51   EGFR >90  --  >90 >90   GLUCOSE 359*  --  287* 223*     Recent Labs     04/27/25  1940 10/16/23  0359 10/15/23  1856   ALBUMIN 4.2 3.9 4.1   ALKPHOS 85  --  70   ALT 23  " --  18   AST 81*  --  20   BILITOT 1.0  --  0.4   LIPASE 142*  --   --      CBC:  Recent Labs     04/28/25  0559 04/27/25  1940 10/16/23  0359   WBC 13.8* 16.0* 17.7*   HGB 15.4 16.6 15.3   HCT 44.2 46.3 42.4    231 225   MCV 86 85 87     COAG:   Recent Labs     04/27/25  2044 10/15/23  1856   INR 1.1 1.1     ENDO:  Recent Labs     04/27/25  1940 10/16/23  0359   HGBA1C 11.3* 7.5*      CARDIAC:   Recent Labs     04/28/25  1501 04/28/25  0508 04/28/25  0045 04/27/25  1940 10/16/23  0359   TROPHS 14,468* 16,117* 18,053*   < > 34,345*   BNP  --   --   --   --  87    < > = values in this interval not displayed.     Recent Labs     10/16/23  0534   CHOL 135   LDLCALC 40   HDL 22.8   TRIG 362*                 Assessment/Plan   {Assess/Plan SmartLinks (Optional):91420}  ***        Followup Appts:  Future Appointments   Date Time Provider Department Center   5/14/2025 11:20 AM Tonio Baker DO [id number] [facility]   6/18/2025  3:15 PM Milvia Denis MD [id number] [facility]           ____________________________________________________________  Tonio Baker DO  [facility] [place]  [facility]       [1]   Past Medical History:  Diagnosis Date    Deep vein thrombosis (DVT) of proximal lower extremity 10/26/2023    Diabetes mellitus type II, non insulin dependent (Multi) 10/26/2023    STEMI involving right coronary artery (Multi) 10/26/2023   [2]   Past Surgical History:  Procedure Laterality Date    CARDIAC CATHETERIZATION N/A 10/15/2023    Procedure: Left Heart Cath, No LV;  Surgeon: Juan Manuel Hall MD;  Location: [facility] Cardiac Cath Lab;  Service: Cardiovascular;  Laterality: N/A;    CARDIAC CATHETERIZATION N/A 10/15/2023    Procedure: PCI;  Surgeon: Juan Manuel Hall MD;  Location: [facility] Cardiac Cath Lab;  Service: Cardiovascular;  Laterality: N/A;    CARDIAC CATHETERIZATION N/A 4/28/2025    Procedure: LHC, With LV;  Surgeon: Rakesh Vargas MD;  Location: [facility] Cardiac Cath Lab;  Service:  Cardiovascular;  Laterality: N/A;   [3]   Allergies  Allergen Reactions    Amoxicillin Rash     Per pt report; states last reaction was at 5 y.o

## 2025-05-14 ENCOUNTER — APPOINTMENT (OUTPATIENT)
Dept: CARDIOLOGY | Facility: CLINIC | Age: 45
End: 2025-05-14

## 2025-05-14 NOTE — PROGRESS NOTES
Chief Complaint:   Hospital follow up     History Of Present Illness:    Francisco May is a 45 y.o. male History of prior DVT/PE in 2019, smoker, premature family history of CAD, STEMI 10/2023 with EMILY x1 to RCA    Here for hospital follow-up after stemi  Presented with several days of chest pain that he initially thought was indigestion.  The night prior to admission significant worsening of symptoms while driving.  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  Has not returned to work waiting on us.    HgA1c came back 7.5%    Of note he says DVT/PE in 2019 felt to be provoked due to his occupation.  He regularly would not get out of [employer] and walk around.  He took Eliquis for about 6 months.    EKG 4/28/25 Normal sinus rhythm     Cath 4/28/25  1. Successful OCT guided PCI of the distal RCA/ostial RPDA with mechanical thrombectomy.   2. Severe multivessel CAD in a right dominant system.   3. Elevated LVEDP.   4. No evidence of aortic stenosis.     I  CARDIAC CATH 10/15/23:  1. Inferior STEMI, now s/p PCI to mid RCA with 3.0 x 22 BRICE FRONTIER EMILY.   2. Right dominant coronary circulation, mid RCA culprit.   3. Moderate non-obstructive CAD elsewhere including distal left main (40%), Prox-mid LAD (50%), ramus (50%).   4. Left Ventricular end-diastolic pressure = 38.    Echo 4/28/25   1. The left ventricular systolic function is severely decreased, with a visually estimated ejection fraction of 25-30%.     ECHO 10/15/23:  1. Left ventricular systolic function is low normal with a 50-55% estimated ejection fraction.   2. Basal and mid inferior wall is abnormal.       Last Recorded Vitals:  There were no vitals filed for this visit.      Past Medical History:  See HPI    Past Surgical History:  He has a past surgical history that includes Cardiac catheterization (N/A, 10/15/2023); Cardiac catheterization (N/A, 10/15/2023); and Cardiac  "catheterization (N/A, 4/28/2025).      Social History:  He reports that he has quit smoking. His smoking use included cigarettes. He has a 12 pack-year smoking history. He does not have any smokeless tobacco history on file. He reports that he does not currently use alcohol. He reports that he does not use drugs.    Family History:  Family History   Problem Relation Name Age of Onset    Heart attack Mother      Heart attack Father          Allergies:  Amoxicillin    Outpatient Medications:  Current Outpatient Medications   Medication Instructions    aspirin 81 mg, oral, Daily    atorvastatin (LIPITOR) 80 mg, oral, Nightly    Farxiga 10 mg, oral, Daily    insulin lispro protamin-lispro (HumaLOG Mix 75-25 KwikPen) 100 unit/mL (75-25) pen Inject 12 Units under the skin 2 times a day as directed    losartan (COZAAR) 12.5 mg, oral, Daily    metoprolol succinate XL (TOPROL-XL) 50 mg, oral, Daily, Do not crush or chew.    nicotine (Nicoderm CQ) 14 mg/24 hr patch 1 patch, transdermal, Every 24 hours    nitroglycerin (NITROSTAT) 0.4 mg, sublingual, Every 5 min PRN    pen needle, diabetic (Pen Needle) 32 gauge x 5/32\" needle Use to inject insulin twice daily as directed. Use a new pen needle with each injection.    prasugrel (Effient) 10 mg tablet Take 1 tablet (10 mg) by mouth once daily    spironolactone (ALDACTONE) 25 mg, oral, Daily       Physical Exam:  GENERAL: alert, cooperative, pleasant, in no acute distress  SKIN: warm, dry, no rash.  NECK: no JVD, no RANDALL  CARDIAC: Regular rate and rhythm with no rubs, murmurs, or gallops  CHEST: Normal respiratory efforts, lungs clear to auscultation bilaterally.  ABDOMEN: soft, nontender, nondistended  EXTREMITIES: no edema  NEURO: Alert and oriented x 3.  Grossly normal.  Moves all 4 extremities.       Last Labs:  CBC -  Lab Results   Component Value Date    WBC 13.8 (H) 04/28/2025    HGB 15.4 04/28/2025    HCT 44.2 04/28/2025    MCV 86 04/28/2025     04/28/2025       CMP " -  Lab Results   Component Value Date    CALCIUM 8.8 04/29/2025    PHOS 3.0 10/16/2023    PROT 7.2 04/27/2025    ALBUMIN 4.2 04/27/2025    AST 81 (H) 04/27/2025    ALT 23 04/27/2025    ALKPHOS 85 04/27/2025    BILITOT 1.0 04/27/2025       LIPID PANEL -   Lab Results   Component Value Date    CHOL 135 10/16/2023    TRIG 362 (H) 10/16/2023    HDL 22.8 10/16/2023    CHHDL 5.9 10/16/2023    VLDL 72 (H) 10/16/2023    NHDL 112 10/16/2023       RENAL FUNCTION PANEL -   Lab Results   Component Value Date    GLUCOSE 359 (H) 04/29/2025     (L) 04/29/2025    K 3.8 04/29/2025     04/29/2025    CO2 19 (L) 04/29/2025    ANIONGAP 13 04/29/2025    BUN 11 04/29/2025    CREATININE 0.65 04/29/2025    CALCIUM 8.8 04/29/2025    PHOS 3.0 10/16/2023    ALBUMIN 4.2 04/27/2025        Lab Results   Component Value Date    BNP 87 10/16/2023    HGBA1C 11.3 (H) 04/27/2025         Assessment/Plan   45 y.o. male History of prior DVT/PE in 2019, former smoker, premature family history of CAD, STEMI 10/2023 with EMILY x1 to RCA    #CAD  -asa, statin, brillinta, metoprolol  -Residual moderate atherosclerotic disease in LAD and ramus.  - Goal LDL less than 70.  Repeat lipids in about 3 months  -Referred to cardiac rehab    #DM2 - new dx  -he wants to try diet and exercise  -repeat A1c in 3 months.   -Needs to establish with primary care provider    #tobacco use  -now quit    #h/o DVT/PE - felt to be provoked. Completed 6 months therapy    Follow up 3 months     Bridget Hatchett, MAPrimary Care Physician: No Assigned PCP Generic Provider, MD  Date of Visit: 05/16/2025 10:00 AM EDT  Location of visit: [facility] [address]     Chief Complaint:   ***     HPI / Summary:   Francisco May is a 45 y.o. male presents for followup. ***      Last Cardiology Tests:  ECG:      Echo:      Cath:      Stress Test:      Cardiac Imaging:    Past Medical History:  Medical History[1]     Past Surgical History:  Surgical History[2]       Social History:  He reports that  "he has quit smoking. His smoking use included cigarettes. He has a 12 pack-year smoking history. He does not have any smokeless tobacco history on file. He reports that he does not currently use alcohol. He reports that he does not use drugs.    Family History:  family history includes Heart attack in his father and mother.      Allergies:  RX Allergies[3]    Outpatient Medications:  Current Outpatient Medications   Medication Instructions    aspirin 81 mg, oral, Daily    atorvastatin (LIPITOR) 80 mg, oral, Nightly    Farxiga 10 mg, oral, Daily    insulin lispro protamin-lispro (HumaLOG Mix 75-25 KwikPen) 100 unit/mL (75-25) pen Inject 12 Units under the skin 2 times a day as directed    losartan (COZAAR) 12.5 mg, oral, Daily    metoprolol succinate XL (TOPROL-XL) 50 mg, oral, Daily, Do not crush or chew.    nicotine (Nicoderm CQ) 14 mg/24 hr patch 1 patch, transdermal, Every 24 hours    nitroglycerin (NITROSTAT) 0.4 mg, sublingual, Every 5 min PRN    pen needle, diabetic (Pen Needle) 32 gauge x 5/32\" needle Use to inject insulin twice daily as directed. Use a new pen needle with each injection.    prasugrel (Effient) 10 mg tablet Take 1 tablet (10 mg) by mouth once daily    spironolactone (ALDACTONE) 25 mg, oral, Daily       Physical Exam:  ***  There were no vitals filed for this visit.  Wt Readings from Last 5 Encounters:   04/28/25 111 kg (244 lb 11.4 oz)   10/25/23 117 kg (257 lb 9.6 oz)   10/15/23 117 kg (257 lb 15 oz)     There is no height or weight on file to calculate BMI.        Last Labs:  CMP:  Recent Labs     04/29/25  0931 04/28/25  1501 04/28/25  1129 04/28/25  0559   *  --  133* 138   K 3.8 4.4 4.5 2.9*     --  104 114*   CO2 19*  --  25 20*   ANIONGAP 13  --  9* 7*   BUN 11  --  10 8   CREATININE 0.65  --  0.75 0.51   EGFR >90  --  >90 >90   GLUCOSE 359*  --  287* 223*     Recent Labs     04/27/25  1940 10/16/23  0359 10/15/23  1856   ALBUMIN 4.2 3.9 4.1   ALKPHOS 85  --  70   ALT 23  " --  18   AST 81*  --  20   BILITOT 1.0  --  0.4   LIPASE 142*  --   --      CBC:  Recent Labs     04/28/25  0559 04/27/25  1940 10/16/23  0359   WBC 13.8* 16.0* 17.7*   HGB 15.4 16.6 15.3   HCT 44.2 46.3 42.4    231 225   MCV 86 85 87     COAG:   Recent Labs     04/27/25  2044 10/15/23  1856   INR 1.1 1.1     ENDO:  Recent Labs     04/27/25  1940 10/16/23  0359   HGBA1C 11.3* 7.5*      CARDIAC:   Recent Labs     04/28/25  1501 04/28/25  0508 04/28/25  0045 04/27/25  1940 10/16/23  0359   TROPHS 14,468* 16,117* 18,053*   < > 34,345*   BNP  --   --   --   --  87    < > = values in this interval not displayed.     Recent Labs     10/16/23  0534   CHOL 135   LDLCALC 40   HDL 22.8   TRIG 362*                 Assessment/Plan   {Assess/Plan SmartLinks (Optional):55580}  ***        Followup Appts:  Future Appointments   Date Time Provider Department Center   5/16/2025 10:00 AM Tonio Baker DO [id number] [facility]   6/18/2025  3:15 PM Milvia Denis MD [id number] [facility]           ____________________________________________________________  Tonio Baker DO  [facility]  [facility]         [1]   Past Medical History:  Diagnosis Date    Deep vein thrombosis (DVT) of proximal lower extremity 10/26/2023    Diabetes mellitus type II, non insulin dependent (Multi) 10/26/2023    STEMI involving right coronary artery (Multi) 10/26/2023   [2]   Past Surgical History:  Procedure Laterality Date    CARDIAC CATHETERIZATION N/A 10/15/2023    Procedure: Left Heart Cath, No LV;  Surgeon: Juan Manuel Hall MD;  Location: [facility] Cardiac Cath Lab;  Service: Cardiovascular;  Laterality: N/A;    CARDIAC CATHETERIZATION N/A 10/15/2023    Procedure: PCI;  Surgeon: Juan Manuel Hall MD;  Location: [facility] Cardiac Cath Lab;  Service: Cardiovascular;  Laterality: N/A;    CARDIAC CATHETERIZATION N/A 4/28/2025    Procedure: LHC, With LV;  Surgeon: Rakesh Vargas MD;  Location: [facility] Cardiac Cath Lab;  Service:  Cardiovascular;  Laterality: N/A;   [3]   Allergies  Allergen Reactions    Amoxicillin Rash     Per pt report; states last reaction was at 5 y.o

## 2025-05-16 ENCOUNTER — APPOINTMENT (OUTPATIENT)
Dept: CARDIOLOGY | Facility: CLINIC | Age: 45
End: 2025-05-16

## 2025-05-27 DIAGNOSIS — I21.11 STEMI INVOLVING RIGHT CORONARY ARTERY (MULTI): ICD-10-CM

## 2025-05-27 DIAGNOSIS — I21.3 STEMI (ST ELEVATION MYOCARDIAL INFARCTION) (MULTI): ICD-10-CM

## 2025-05-27 DIAGNOSIS — E11.9 DIABETES MELLITUS TYPE II, NON INSULIN DEPENDENT (MULTI): ICD-10-CM

## 2025-05-27 DIAGNOSIS — I21.4 NSTEMI (NON-ST ELEVATED MYOCARDIAL INFARCTION) (MULTI): ICD-10-CM

## 2025-05-27 RX ORDER — METOPROLOL SUCCINATE 50 MG/1
50 TABLET, EXTENDED RELEASE ORAL DAILY
Qty: 10 TABLET | Refills: 0 | Status: SHIPPED | OUTPATIENT
Start: 2025-05-27 | End: 2025-06-26

## 2025-05-27 RX ORDER — LOSARTAN POTASSIUM 25 MG/1
12.5 TABLET ORAL DAILY
Qty: 5 TABLET | Refills: 0 | Status: SHIPPED | OUTPATIENT
Start: 2025-05-27 | End: 2025-06-26

## 2025-05-27 RX ORDER — ATORVASTATIN CALCIUM 80 MG/1
80 TABLET, FILM COATED ORAL NIGHTLY
Qty: 10 TABLET | Refills: 0 | Status: SHIPPED | OUTPATIENT
Start: 2025-05-27 | End: 2025-06-06

## 2025-05-27 RX ORDER — DAPAGLIFLOZIN 10 MG/1
10 TABLET, FILM COATED ORAL DAILY
Qty: 10 TABLET | Refills: 0 | Status: SHIPPED | OUTPATIENT
Start: 2025-05-27 | End: 2025-06-26

## 2025-05-27 RX ORDER — SPIRONOLACTONE 25 MG/1
25 TABLET ORAL DAILY
Qty: 10 TABLET | Refills: 0 | Status: SHIPPED | OUTPATIENT
Start: 2025-05-27 | End: 2025-06-26

## 2025-05-27 RX ORDER — PRASUGREL 10 MG/1
10 TABLET, FILM COATED ORAL DAILY
Qty: 10 TABLET | Refills: 5 | Status: SHIPPED | OUTPATIENT
Start: 2025-05-27

## 2025-05-27 NOTE — TELEPHONE ENCOUNTER
Spoke with patient regarding upcoming visit June 3rd with Dr. Baker. Patient has been informed since listed as self pay he would need to pay office visit $110 at the time of visit. Patient confirmed understanding an has no further questions still waiting on Medicaid to be approved.

## 2025-05-29 NOTE — PROGRESS NOTES
Primary Care Physician: No Assigned PCP Generic Provider, MD  Date of Visit: 06/03/2025  1:20 PM EDT  Location of visit: [facility] [address]     Chief Complaint:   Hospital follow up     HPI / Summary:   Francisco May is a 45 y.o. male History of prior DVT/PE in 2019, smoker, premature family history of CAD, STEMI 10/2023 with EMILY x1 to RCA, then lost to follow-up citing lack of insurance, NSTEMI 4/2025 with catheterization showing severe multivessel CAD and subsequent thrombectomy of RCA/distal PDA lesion, ischemic cardiomyopathy with EF 25-30%    Met him again in 4/2025 when he presented with recurrent substernal chest pressure.  He had not taken any medications in over a year.  Continues to smoke.  Left heart catheterization 4/28/2025 as below showed severe three-vessel disease including 70 to 80% mid LAD, LCx and ramus and a thrombotic distal RCA into the PDA.  PDA was felt to be culprit lesion.  Decision was made to pursue thrombectomy and possible stenting.  Aspiration thrombectomy showed complete resolution of the lesion and resolved OCT showed no heavy atherosclerotic burden in the culprit area.  Due to concern regarding compliance with medical therapy and good thrombectomy result we decided on medical therapy for now and follow-up with CT surgery to consider outpatient CABG.    Interval events:    HgA1c was up to 11%. Put on insulin but he's stretching it out due to lack of insurance  Reports compliance with current medications  Overall feeling better.  No chest pain or shortness of breath.  No longer working as my recommendation until he can get revascularization with CABG.  Walking 1-2 blocks a day  His biggest stressor is awaiting work to get him health insurance.  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 Currently paying out-of-pocket. States waiting on his employer to fill out appropriate paperwork          Last Cardiology Tests:  ECG:    EKG 4/28/25 Normal sinus rhythm    Echo:  Echo 4/28/25   1. The left ventricular systolic function is severely decreased, with a visually estimated ejection fraction of 25-30%.     ECHO 10/15/23:  1. Left ventricular systolic function is low normal with a 50-55% estimated ejection fraction.   2. Basal and mid inferior wall is abnormal.    Cath:  Cath 4/28/25  1. Successful OCT guided PCI of the distal RCA/ostial RPDA with mechanical thrombectomy.   2. Severe multivessel CAD in a right dominant system.   3. Elevated LVEDP.   4. No evidence of aortic stenosis.     I  CARDIAC CATH 10/15/23:  1. Inferior STEMI, now s/p PCI to mid RCA with 3.0 x 22 BRICE FRONTIER EMILY.   2. Right dominant coronary circulation, mid RCA culprit.   3. Moderate non-obstructive CAD elsewhere including distal left main (40%), Prox-mid LAD (50%), ramus (50%).   4. Left Ventricular end-diastolic pressure = 38.    Stress Test:      Cardiac Imaging:    Past Medical History:  Medical History[1]     Past Surgical History:  Surgical History[2]       Social History:  He reports that he has quit smoking. His smoking use included cigarettes. He has a 12 pack-year smoking history. He does not have any smokeless tobacco history on file. He reports that he does not currently use alcohol. He reports that he does not use drugs.    Family History:  family history includes Heart attack in his father and mother.      Allergies:  RX Allergies[3]    Outpatient Medications:  Current Outpatient Medications   Medication Instructions    atorvastatin (LIPITOR) 80 mg, oral, Nightly    dapagliflozin propanediol (FARXIGA) 10 mg, oral, Daily    insulin lispro protamin-lispro (HumaLOG Mix 75-25 KwikPen) 100 unit/mL (75-25) pen Inject 12 Units under the skin 2 times a day as directed    losartan (COZAAR) 12.5 mg, oral, Daily    metoprolol succinate XL (TOPROL-XL) 50 mg, oral, Daily, Do not crush or chew.    nicotine (Nicoderm CQ) 14 mg/24 hr patch 1 patch, transdermal, Every 24 hours    nitroglycerin  "(NITROSTAT) 0.4 mg, sublingual, Every 5 min PRN    pen needle, diabetic (Pen Needle) 32 gauge x 5/32\" needle Use to inject insulin twice daily as directed. Use a new pen needle with each injection.    prasugrel (Effient) 10 mg tablet Take 1 tablet (10 mg) by mouth once daily    spironolactone (ALDACTONE) 25 mg, oral, Daily       Physical Exam:  GENERAL: alert, cooperative, pleasant, in no acute distress  SKIN: warm, dry, no rash.  NECK: no JVD, no RANDALL  CARDIAC: Regular rate and rhythm with no rubs, murmurs, or gallops  CHEST: Normal respiratory efforts, lungs clear to auscultation bilaterally.  EXTREMITIES: no edema  NEURO: Alert and oriented x 3.  Grossly normal.  Moves all 4 extremities.    Vitals:    06/03/25 1320   BP: 111/71   BP Location: Right arm   Patient Position: Sitting   BP Cuff Size: Adult   Pulse: 107   SpO2: 94%   Weight: 108 kg (239 lb)     Wt Readings from Last 5 Encounters:   04/28/25 111 kg (244 lb 11.4 oz)   10/25/23 117 kg (257 lb 9.6 oz)   10/15/23 117 kg (257 lb 15 oz)     Body mass index is 38.59 kg/m².        Last Labs:  CMP:  Recent Labs     04/29/25  0931 04/28/25  1501 04/28/25  1129 04/28/25  0559   *  --  133* 138   K 3.8 4.4 4.5 2.9*     --  104 114*   CO2 19*  --  25 20*   ANIONGAP 13  --  9* 7*   BUN 11  --  10 8   CREATININE 0.65  --  0.75 0.51   EGFR >90  --  >90 >90   GLUCOSE 359*  --  287* 223*     Recent Labs     04/27/25  1940 10/16/23  0359 10/15/23  1856   ALBUMIN 4.2 3.9 4.1   ALKPHOS 85  --  70   ALT 23  --  18   AST 81*  --  20   BILITOT 1.0  --  0.4   LIPASE 142*  --   --      CBC:  Recent Labs     04/28/25  0559 04/27/25 1940 10/16/23  0359   WBC 13.8* 16.0* 17.7*   HGB 15.4 16.6 15.3   HCT 44.2 46.3 42.4    231 225   MCV 86 85 87     COAG:   Recent Labs     04/27/25 2044 10/15/23  1856   INR 1.1 1.1     ENDO:  Recent Labs     04/27/25  1940 10/16/23  0359   HGBA1C 11.3* 7.5*      CARDIAC:   Recent Labs     04/28/25  1501 04/28/25  0508 " 04/28/25  0045 04/27/25  1940 10/16/23  0359   TROPHS 14,468* 16,117* 18,053*   < > 34,345*   BNP  --   --   --   --  87    < > = values in this interval not displayed.     Recent Labs     10/16/23  0534   CHOL 135   LDLCALC 40   HDL 22.8   TRIG 362*           Assessment/Plan   45 y.o. male History of prior DVT/PE in 2019, smoker, premature family history of CAD, STEMI 10/2023 with EMILY x1 to RCA, then lost to follow-up citing lack of insurance, NSTEMI 4/2025 with catheterization showing severe multivessel CAD and subsequent thrombectomy of RCA/distal PDA lesion, ischemic cardiomyopathy with EF 25-30%. He is being worked up for outpatient CABG.  Symptomatically much better with medical therapy. He's waiting on insurance approval through work before proceeding. Schedule to see CT surgery in a couple weeks. He is on aspirin, prasugrel, atorvastatin, farxigan, losartan, metoprolol and spironolactone. Refills sent but Will need to get insulin as well. Ok to hold prasugrel as needed prior to CABG.         Followup Appts:  Future Appointments   Date Time Provider Department Center   6/18/2025  3:15 PM Milvia Denis MD [id number] [facility]           ____________________________________________________________  Tonio Baker DO  [facility]  [facility]         [1]   Past Medical History:  Diagnosis Date    Deep vein thrombosis (DVT) of proximal lower extremity 10/26/2023    Diabetes mellitus type II, non insulin dependent (Multi) 10/26/2023    STEMI involving right coronary artery (Multi) 10/26/2023   [2]   Past Surgical History:  Procedure Laterality Date    CARDIAC CATHETERIZATION N/A 10/15/2023    Procedure: Left Heart Cath, No LV;  Surgeon: Juan Manuel Hall MD;  Location: [facility] Cardiac Cath Lab;  Service: Cardiovascular;  Laterality: N/A;    CARDIAC CATHETERIZATION N/A 10/15/2023    Procedure: PCI;  Surgeon: Juan Manuel Hall MD;  Location: [facility] Cardiac Cath Lab;  Service: Cardiovascular;   Laterality: N/A;    CARDIAC CATHETERIZATION N/A 4/28/2025    Procedure: LHC, With LV;  Surgeon: Rakesh Vargas MD;  Location: [facility] Cardiac Cath Lab;  Service: Cardiovascular;  Laterality: N/A;   [3]   Allergies  Allergen Reactions    Amoxicillin Rash     Per pt report; states last reaction was at 5 y.o

## 2025-06-03 ENCOUNTER — PHARMACY VISIT (OUTPATIENT)
Dept: PHARMACY | Facility: CLINIC | Age: 45
End: 2025-06-03
Payer: COMMERCIAL

## 2025-06-03 ENCOUNTER — OFFICE VISIT (OUTPATIENT)
Dept: CARDIOLOGY | Facility: CLINIC | Age: 45
End: 2025-06-03

## 2025-06-03 VITALS
SYSTOLIC BLOOD PRESSURE: 111 MMHG | BODY MASS INDEX: 38.59 KG/M2 | OXYGEN SATURATION: 94 % | HEART RATE: 107 BPM | DIASTOLIC BLOOD PRESSURE: 71 MMHG | WEIGHT: 239 LBS

## 2025-06-03 DIAGNOSIS — I21.11 STEMI INVOLVING RIGHT CORONARY ARTERY (MULTI): ICD-10-CM

## 2025-06-03 DIAGNOSIS — I21.4 NSTEMI (NON-ST ELEVATED MYOCARDIAL INFARCTION) (MULTI): ICD-10-CM

## 2025-06-03 DIAGNOSIS — E11.9 DIABETES MELLITUS TYPE II, NON INSULIN DEPENDENT (MULTI): ICD-10-CM

## 2025-06-03 DIAGNOSIS — I21.3 STEMI (ST ELEVATION MYOCARDIAL INFARCTION) (MULTI): ICD-10-CM

## 2025-06-03 PROCEDURE — 3074F SYST BP LT 130 MM HG: CPT | Performed by: INTERNAL MEDICINE

## 2025-06-03 PROCEDURE — RXMED WILLOW AMBULATORY MEDICATION CHARGE

## 2025-06-03 PROCEDURE — 99212 OFFICE O/P EST SF 10 MIN: CPT

## 2025-06-03 PROCEDURE — 4010F ACE/ARB THERAPY RXD/TAKEN: CPT | Performed by: INTERNAL MEDICINE

## 2025-06-03 PROCEDURE — 99214 OFFICE O/P EST MOD 30 MIN: CPT | Performed by: INTERNAL MEDICINE

## 2025-06-03 PROCEDURE — 3046F HEMOGLOBIN A1C LEVEL >9.0%: CPT | Performed by: INTERNAL MEDICINE

## 2025-06-03 PROCEDURE — 3078F DIAST BP <80 MM HG: CPT | Performed by: INTERNAL MEDICINE

## 2025-06-03 RX ORDER — ATORVASTATIN CALCIUM 80 MG/1
80 TABLET, FILM COATED ORAL NIGHTLY
Qty: 30 TABLET | Refills: 1 | Status: SHIPPED | OUTPATIENT
Start: 2025-06-03 | End: 2025-07-03

## 2025-06-03 RX ORDER — DAPAGLIFLOZIN 10 MG/1
10 TABLET, FILM COATED ORAL DAILY
Qty: 30 TABLET | Refills: 1 | Status: SHIPPED | OUTPATIENT
Start: 2025-06-03 | End: 2025-07-03

## 2025-06-03 RX ORDER — METOPROLOL SUCCINATE 50 MG/1
50 TABLET, EXTENDED RELEASE ORAL DAILY
Qty: 30 TABLET | Refills: 0 | Status: SHIPPED | OUTPATIENT
Start: 2025-06-03 | End: 2025-07-03

## 2025-06-03 RX ORDER — PRASUGREL 10 MG/1
10 TABLET, FILM COATED ORAL DAILY
Qty: 30 TABLET | Refills: 1 | Status: SHIPPED | OUTPATIENT
Start: 2025-06-03

## 2025-06-03 RX ORDER — SPIRONOLACTONE 25 MG/1
25 TABLET ORAL DAILY
Qty: 30 TABLET | Refills: 1 | Status: SHIPPED | OUTPATIENT
Start: 2025-06-03 | End: 2025-07-03

## 2025-06-03 RX ORDER — LOSARTAN POTASSIUM 25 MG/1
12.5 TABLET ORAL DAILY
Qty: 15 TABLET | Refills: 1 | Status: SHIPPED | OUTPATIENT
Start: 2025-06-03 | End: 2025-07-03

## 2025-06-04 ENCOUNTER — APPOINTMENT (OUTPATIENT)
Dept: CARDIAC SURGERY | Facility: CLINIC | Age: 45
End: 2025-06-04

## 2025-06-13 NOTE — PROGRESS NOTES
Chief Complaint  CABG evaluation     HPI:  Mr. Francisco May is a 45-year-old male with a history of HTN, HLD, DM2, obesity, DVT / PE (), tobacco use, family history of premature CAD, personal history of CAD and STEMI s/p EMILY x1 to RCA (10/2023). He was lost to follow up due to lack of insurance coverage. He presented again in 2025 with an NSTEMI and ischemic cardiomyopathy with LVEF 25-30%. Cardiac cath 25 demonstrated right dominant coronary circulation, 75% proximal to mid LAD stenosis, 90% ostial D1 stenosis, 50% mid LCx stenosis, 70% proximal OM2 stenosis, 80% mid ramus stenosis, 99% ostial RCA stenosis, patent RCA stent, 70% distal RCA stenosis, and 99% ostial rPDA stenosis. Successful PCI of the distal RCA / ostial RPDA with mechanical thrombectomy was performed. TTE 25 demonstrated severe LV dysfunction with LVEF 25-30%, normal RV function, no AI, trace MR, and trace TR. He presents now for evaluation for CABG.     He denies chest pain, dyspnea, and palpitations. He is anxious about surgery as multiple family members have  from coronary artery disease. His father  of an MI at the age of 48 and his mother  3 weeks ago. He recently quit smoking and is committed to long term smoking cessation. He works for a [employer] company, but has not been working since 2025 due to his NSTEMI and ischemic cardiomyopathy.     Medical History[1]    Surgical History[2]    Family History[3]    Social History     Socioeconomic History    Marital status: Single     Spouse name: Not on file    Number of children: Not on file    Years of education: Not on file    Highest education level: Not on file   Occupational History    Not on file   Tobacco Use    Smoking status: Former     Current packs/day: 1.50     Average packs/day: 1.5 packs/day for 8.0 years (12.0 ttl pk-yrs)     Types: Cigarettes    Smokeless tobacco: Not on file   Substance and Sexual Activity    Alcohol use: Not Currently    Drug use: Never     Sexual activity: Not on file   Other Topics Concern    Not on file   Social History Narrative    Not on file     Social Drivers of Health     Financial Resource Strain: Low Risk  (4/28/2025)    Overall Financial Resource Strain (CARDIA)     Difficulty of Paying Living Expenses: Not hard at all   Recent Concern: Financial Resource Strain - High Risk (4/27/2025)    Overall Financial Resource Strain (CARDIA)     Difficulty of Paying Living Expenses: Very hard   Food Insecurity: No Food Insecurity (4/27/2025)    Hunger Vital Sign     Worried About Running Out of Food in the Last Year: Never true     Ran Out of Food in the Last Year: Never true   Transportation Needs: No Transportation Needs (4/28/2025)    PRAPARE - Transportation     Lack of Transportation (Medical): No     Lack of Transportation (Non-Medical): No   Physical Activity: Inactive (4/28/2025)    Exercise Vital Sign     Days of Exercise per Week: 0 days     Minutes of Exercise per Session: 0 min   Stress: Not on file   Social Connections: Not on file   Intimate Partner Violence: Not At Risk (4/27/2025)    Humiliation, Afraid, Rape, and Kick questionnaire     Fear of Current or Ex-Partner: No     Emotionally Abused: No     Physically Abused: No     Sexually Abused: No   Housing Stability: Low Risk  (4/28/2025)    Housing Stability Vital Sign     Unable to Pay for Housing in the Last Year: No     Number of Times Moved in the Last Year: 0     Homeless in the Last Year: No       RX Allergies[4]    Encounter Medications[5]    Physical Exam  Constitutional:       General: He is not in acute distress.     Appearance: Normal appearance. He is not ill-appearing.   HENT:      Head: Normocephalic and atraumatic.   Cardiovascular:      Rate and Rhythm: Normal rate and regular rhythm.   Pulmonary:      Effort: Pulmonary effort is normal. No respiratory distress.      Breath sounds: No wheezing.   Musculoskeletal:      Right lower leg: No edema.      Left lower leg: No  edema.   Skin:     General: Skin is warm and dry.   Neurological:      Mental Status: He is alert and oriented to person, place, and time. Mental status is at baseline.   Psychiatric:         Behavior: Behavior normal.         Thought Content: Thought content normal.       Encounter Date: 04/27/25   Electrocardiogram 12 Lead   Result Value    Ventricular Rate 83    Atrial Rate 83    NV Interval 132    QRS Duration 86    QT Interval 408    QTC Calculation(Bazett) 479    P Axis 25    R Axis 54    T Axis -38    QRS Count 13    Q Onset 216    P Onset 150    P Offset 200    T Offset 420    QTC Fredericia 454    Narrative    Normal sinus rhythm  Possible Inferior infarct , age undetermined  T wave abnormality, consider lateral ischemia  Abnormal ECG  When compared with ECG of 28-APR-2025 03:12,  Borderline criteria for Lateral infarct are no longer Present  Borderline criteria for Inferior infarct are now Present  T wave inversion now evident in Anterolateral leads       Lab Results   Component Value Date    WBC 13.8 (H) 04/28/2025    HGB 15.4 04/28/2025    HCT 44.2 04/28/2025    MCV 86 04/28/2025     04/28/2025     Lab Results   Component Value Date    GLUCOSE 359 (H) 04/29/2025    CALCIUM 8.8 04/29/2025     (L) 04/29/2025    K 3.8 04/29/2025    CO2 19 (L) 04/29/2025     04/29/2025    BUN 11 04/29/2025    CREATININE 0.65 04/29/2025     Transthoracic Echo Complete  Result Date: 4/28/2025   [facility], [address] [place] [address]              Tel [phone] and Fax [phone] TRANSTHORACIC ECHOCARDIOGRAM REPORT  Patient Name:       MEGAN Uriarte Physician:    [id number] Tonio Baker DO Study Date:         4/28/2025           Ordering Provider:    [id number] MAVIS RODRIGUEZ MRN/PID:            [id number]            Fellow: Accession#:         [id number]         Nurse: Date of Birth/Age:  1980 / 45      Sonographer:          Ambrosio hammond Gender assigned at  M                   Additional Staff: Birth: Height:             168.00 cm           Admit Date:           4/27/2025 Weight:             111.00 kg           Admission Status:     Inpatient -                                                               Routine BSA / BMI:          2.18 m2 / 39.33     Encounter#:           [id number]                     kg/m2 Blood Pressure:     110/71 mmHg         Department Location:  [facility] Non                                                               Invasive Study Type:    TRANSTHORACIC ECHO (TTE) COMPLETE Diagnosis/ICD: Non ST elevation (NSTEMI) myocardial infarction-I21.4 Indication:    Here for NSTEMI, Gallop heard on exam CPT Code:      Echo Complete w Full [id number] Patient History: Diabetes:          Yes Pertinent History: Previous DVT. Acute MI (inferior wall), STEMI, NSTEMI. Study Detail: The following Echo studies were performed: 2D, M-Mode, Doppler and               color flow. Technically challenging study due to body habitus.               Definity used as a contrast agent for endocardial border               definition. Total contrast used for this procedure was 2 mL via IV               push.  PHYSICIAN INTERPRETATION: Left Ventricle: The left ventricular systolic function is severely decreased, with a visually estimated ejection fraction of 25-30%. There is global hypokinesis of the left ventricle with minor regional variations. The left ventricular cavity size is normal. There is mildly increased septal and normal posterior left ventricular wall thickness. Spectral Doppler shows a Grade II (pseudonormal pattern) of left ventricular diastolic filling with an elevated left atrial pressure. Left Atrium: The left atrial size is normal. Right Ventricle: The right ventricle is normal in size. There is normal right ventricular  global systolic function. Right Atrium: The right atrium is normal in size. Aortic Valve: The aortic valve was not well visualized. The aortic valve dimensionless index is 1.19. There is no evidence of aortic valve regurgitation. The peak instantaneous gradient of the aortic valve is 2 mmHg. The mean gradient of the aortic valve is 1 mmHg. Mitral Valve: The mitral valve is normal in structure. There is trace mitral valve regurgitation. Tricuspid Valve: The tricuspid valve is structurally normal. There is trace tricuspid regurgitation. Pulmonic Valve: The pulmonic valve is structurally normal. There is no indication of pulmonic valve regurgitation. Pericardium: There is no pericardial effusion noted. Aorta: The aortic root is normal. In comparison to the previous echocardiogram(s): Compared with study dated 10/16/2023, left ventricular function is now severely decreased.  CONCLUSIONS:  1. The left ventricular systolic function is severely decreased, with a visually estimated ejection fraction of 25-30%.  2. There is global hypokinesis of the left ventricle with minor regional variations.  3. Spectral Doppler shows a Grade II (pseudonormal pattern) of left ventricular diastolic filling with an elevated left atrial pressure.  4. There is normal right ventricular global systolic function.  5. Compared with study dated 10/16/2023, left ventricular function is now severely decreased. QUANTITATIVE DATA SUMMARY:  2D MEASUREMENTS:          Normal Ranges: Ao Root s:       3.54 cm IVSd:            1.05 cm  (0.6-1.1cm) LVPWd:           0.92 cm  (0.6-1.1cm) LVIDd:           4.52 cm  (3.9-5.9cm) LVIDs:           3.19 cm LV Mass Index:   70 g/m2 LVEDV Index:     69 ml/m2 LV % FS          29.5 %  LEFT ATRIUM:                  Normal Ranges: LA Vol A4C:        50.7 ml    (22+/-6mL/m2) LA Vol A2C:        54.9 ml LA Vol BP:         53.3 ml LA Vol Index A4C:  23.2ml/m2 LA Vol Index A2C:  25.1 ml/m2 LA Vol Index BP:   24.4 ml/m2 LA Area  A4C:       17.1 cm2 LA Area A2C:       17.6 cm2 LA Major Axis A4C: 4.9 cm LA Major Axis A2C: 4.8 cm LA Volume Index:   24.4 ml/m2 LA Vol A4C:        48.4 ml LA Vol A2C:        51.2 ml LA Vol Index BSA:  22.8 ml/m2  RIGHT ATRIUM:                 Normal Ranges: RA Vol A4C:        42.3 ml    (8.3-19.5ml) RA Vol Index A4C:  19.4 ml/m2 RA Area A4C:       15.3 cm2 RA Major Axis A4C: 4.7 cm  AORTA MEASUREMENTS:         Normal Ranges: Ao Sinus, d:        3.50 cm (2.1-3.5cm) Ao STJ, d:          3.00 cm (1.7-3.4cm) Asc Ao, d:          3.00 cm (2.1-3.4cm)  LV SYSTOLIC FUNCTION:                      Normal Ranges: EF-A4C View:    33 % (>=55%) EF-A2C View:    10 % EF-Biplane:     19 % EF-Visual:      28 % LV EF Reported: 28 %  LV DIASTOLIC FUNCTION:             Normal Ranges: MV Peak E:             0.69 m/s    (0.7-1.2 m/s) MV Peak A:             0.50 m/s    (0.42-0.7 m/s) E/A Ratio:             1.39        (1.0-2.2) MV e'                  0.120 m/s   (>8.0) MV lateral e'          0.15 m/s MV medial e'           0.09 m/s MV A Dur:              108.47 msec E/e' Ratio:            5.77        (<8.0) PulmV Sys Boubacar:         34.71 cm/s PulmV Acevedo Boubacar:        52.83 cm/s PulmV S/D Boubacar:         0.66 PulmV A Revs Boubacar:      25.68 cm/s PulmV A Revs Dur:      135.11 msec  MITRAL VALVE:          Normal Ranges: MV DT:        206 msec (150-240msec)  AORTIC VALVE:                     Normal Ranges: AoV Vmax:                0.70 m/s (<=1.7m/s) AoV Peak P.9 mmHg (<20mmHg) AoV Mean P.0 mmHg (1.7-11.5mmHg) LVOT Max Boubacar:            0.70 m/s (<=1.1m/s) AoV VTI:                 10.88 cm (18-25cm) LVOT VTI:                12.91 cm LVOT Diameter:           2.00 cm  (1.8-2.4cm) AoV Area, VTI:           3.73 cm2 (2.5-5.5cm2) AoV Area,Vmax:           3.16 cm2 (2.5-4.5cm2) AoV Dimensionless Index: 1.19  RIGHT VENTRICLE: RV Basal 4.10 cm RV Mid   2.40 cm RV Major 7.8 cm TAPSE:   19.2 mm RV s'    0.11 m/s  TRICUSPID VALVE/RVSP:          Normal Ranges: Est. RA Pressure:     3 mmHg IVC Diam:             2.30 cm  PULMONIC VALVE:          Normal Ranges: PV Accel Time:  112 msec (>120ms) PV Max Boubacar:     0.8 m/s  (0.6-0.9m/s) PV Max P.3 mmHg  PULMONARY VEINS: PulmV A Revs Dur: 135.11 msec PulmV A Revs Boubacar: 25.68 cm/s PulmV Acevedo Boubacar:   52.83 cm/s PulmV S/D Boubacar:    0.66 PulmV Sys Boubacar:    34.71 cm/s  AORTA: Asc Ao Diam 3.00 cm  [id number] Tonio Samuel DO Electronically signed on 2025 at 9:47:49 AM  ** Final **        Assessment and Plan:   Mr. Francisco May is a 45-year-old male who presented with an NSTEMI, ischemic cardiomyopathy, and multivessel CAD    - Plan for CABG x4 with BIMAs and SVG (LIMA to LAD, BATSHEVA to OM, SVG to ramus, SVG to PDA)  - The risks, benefits, and alternatives of surgery were discussed with the patient and his wife. Informed consent was obtained.   - Plan to obtain a repeat TTE, CT Chest, carotid duplex US, vein mapping, and PFTs preoperatively   - We will schedule him for surgery in the next few weeks pending preoperative testing     Thank you for the opportunity to participate in his care.     Milvia Denis MD  Cardiac Surgeon  Division of Cardiac Surgery  [facility]       [1]   Past Medical History:  Diagnosis Date    Deep vein thrombosis (DVT) of proximal lower extremity 10/26/2023    Diabetes mellitus type II, non insulin dependent (Multi) 10/26/2023    STEMI involving right coronary artery (Multi) 10/26/2023   [2]   Past Surgical History:  Procedure Laterality Date    CARDIAC CATHETERIZATION N/A 10/15/2023    Procedure: Left Heart Cath, No LV;  Surgeon: Juan Manuel Hall MD;  Location: [facility] Cardiac Cath Lab;  Service: Cardiovascular;  Laterality: N/A;    CARDIAC CATHETERIZATION N/A 10/15/2023    Procedure: PCI;  Surgeon: Juan Manuel Hall MD;  Location: [facility] Cardiac Cath Lab;  Service: Cardiovascular;  Laterality: N/A;    CARDIAC CATHETERIZATION N/A 2025    Procedure:  "[facility], With LV;  Surgeon: Rakesh Vargas MD;  Location: [facility] Cardiac Cath Lab;  Service: Cardiovascular;  Laterality: N/A;   [3]   Family History  Problem Relation Name Age of Onset    Heart attack Mother      Heart attack Father     [4]   Allergies  Allergen Reactions    Amoxicillin Rash     Per pt report; states last reaction was at 5 y.o   [5]   Outpatient Encounter Medications as of 6/18/2025   Medication Sig Dispense Refill    atorvastatin (Lipitor) 80 mg tablet Take 1 tablet (80 mg) by mouth once daily at bedtime. 30 tablet 1    dapagliflozin propanediol (Farxiga) 10 mg tablet Take 1 tablet (10 mg) by mouth once daily. 30 tablet 1    insulin lispro protamin-lispro (HumaLOG Mix 75-25 KwikPen) 100 unit/mL (75-25) pen Inject 12 Units under the skin 2 times a day as directed 15 mL 0    losartan (Cozaar) 25 mg tablet Take 0.5 tablets (12.5 mg) by mouth once daily. 15 tablet 1    metoprolol succinate XL (Toprol-XL) 50 mg 24 hr tablet Take 1 tablet (50 mg) by mouth once daily. Do not crush or chew. 30 tablet 0    nicotine (Nicoderm CQ) 14 mg/24 hr patch Place 1 patch over 24 hours on the skin once every 24 hours. 30 patch 0    nitroglycerin (Nitrostat) 0.4 mg SL tablet Place 1 tablet (0.4 mg) under the tongue every 5 minutes if needed for chest pain. 90 tablet 12    pen needle, diabetic (Pen Needle) 32 gauge x 5/32\" needle Use to inject insulin twice daily as directed. Use a new pen needle with each injection. 100 each 0    prasugrel (Effient) 10 mg tablet Take 1 tablet (10 mg) by mouth once daily 30 tablet 1    spironolactone (Aldactone) 25 mg tablet Take 1 tablet (25 mg) by mouth once daily. 30 tablet 1     No facility-administered encounter medications on file as of 6/18/2025.     "

## 2025-06-18 ENCOUNTER — OFFICE VISIT (OUTPATIENT)
Dept: CARDIAC SURGERY | Facility: CLINIC | Age: 45
End: 2025-06-18

## 2025-06-18 VITALS
HEART RATE: 97 BPM | DIASTOLIC BLOOD PRESSURE: 80 MMHG | HEIGHT: 66 IN | BODY MASS INDEX: 38.6 KG/M2 | WEIGHT: 240.2 LBS | OXYGEN SATURATION: 96 % | SYSTOLIC BLOOD PRESSURE: 124 MMHG

## 2025-06-18 DIAGNOSIS — I65.23 CAROTID STENOSIS, BILATERAL: ICD-10-CM

## 2025-06-18 DIAGNOSIS — F17.200 CURRENT SMOKER: ICD-10-CM

## 2025-06-18 DIAGNOSIS — I21.4 NSTEMI (NON-ST ELEVATED MYOCARDIAL INFARCTION) (MULTI): ICD-10-CM

## 2025-06-18 DIAGNOSIS — I25.119 CORONARY ARTERY DISEASE INVOLVING NATIVE CORONARY ARTERY OF NATIVE HEART WITH ANGINA PECTORIS: Primary | ICD-10-CM

## 2025-06-18 PROCEDURE — 99212 OFFICE O/P EST SF 10 MIN: CPT

## 2025-06-18 PROCEDURE — 99203 OFFICE O/P NEW LOW 30 MIN: CPT | Performed by: STUDENT IN AN ORGANIZED HEALTH CARE EDUCATION/TRAINING PROGRAM

## 2025-06-18 PROCEDURE — 3079F DIAST BP 80-89 MM HG: CPT | Performed by: STUDENT IN AN ORGANIZED HEALTH CARE EDUCATION/TRAINING PROGRAM

## 2025-06-18 PROCEDURE — 3074F SYST BP LT 130 MM HG: CPT | Performed by: STUDENT IN AN ORGANIZED HEALTH CARE EDUCATION/TRAINING PROGRAM

## 2025-06-18 PROCEDURE — 4010F ACE/ARB THERAPY RXD/TAKEN: CPT | Performed by: STUDENT IN AN ORGANIZED HEALTH CARE EDUCATION/TRAINING PROGRAM

## 2025-06-18 PROCEDURE — 3008F BODY MASS INDEX DOCD: CPT | Performed by: STUDENT IN AN ORGANIZED HEALTH CARE EDUCATION/TRAINING PROGRAM

## 2025-06-18 PROCEDURE — 3046F HEMOGLOBIN A1C LEVEL >9.0%: CPT | Performed by: STUDENT IN AN ORGANIZED HEALTH CARE EDUCATION/TRAINING PROGRAM

## 2025-06-18 RX ORDER — ALBUTEROL SULFATE 0.83 MG/ML
3 SOLUTION RESPIRATORY (INHALATION) ONCE
OUTPATIENT
Start: 2025-06-18 | End: 2025-06-18

## 2025-06-18 RX ORDER — IBUPROFEN 200 MG
1 TABLET ORAL EVERY 24 HOURS
Qty: 30 PATCH | Refills: 0 | Status: SHIPPED | OUTPATIENT
Start: 2025-06-18 | End: 2025-07-18

## 2025-06-18 RX ORDER — ALBUTEROL SULFATE 90 UG/1
1 INHALANT RESPIRATORY (INHALATION) ONCE
OUTPATIENT
Start: 2025-06-18

## 2025-06-18 ASSESSMENT — PATIENT HEALTH QUESTIONNAIRE - PHQ9
1. LITTLE INTEREST OR PLEASURE IN DOING THINGS: NOT AT ALL
SUM OF ALL RESPONSES TO PHQ9 QUESTIONS 1 AND 2: 0
2. FEELING DOWN, DEPRESSED OR HOPELESS: NOT AT ALL

## 2025-06-18 ASSESSMENT — ENCOUNTER SYMPTOMS
DEPRESSION: 0
OCCASIONAL FEELINGS OF UNSTEADINESS: 0
LOSS OF SENSATION IN FEET: 0

## 2025-06-18 ASSESSMENT — PAIN SCALES - GENERAL: PAINLEVEL_OUTOF10: 0-NO PAIN

## 2025-06-20 DIAGNOSIS — I25.110 CORONARY ARTERY DISEASE INVOLVING NATIVE CORONARY ARTERY OF NATIVE HEART WITH UNSTABLE ANGINA PECTORIS: ICD-10-CM

## 2025-06-25 DIAGNOSIS — I25.119 CORONARY ARTERY DISEASE INVOLVING NATIVE CORONARY ARTERY OF NATIVE HEART WITH ANGINA PECTORIS: ICD-10-CM

## 2025-06-25 DIAGNOSIS — I21.4 NSTEMI (NON-ST ELEVATED MYOCARDIAL INFARCTION) (MULTI): Primary | ICD-10-CM

## 2025-07-17 ENCOUNTER — APPOINTMENT (OUTPATIENT)
Dept: VASCULAR MEDICINE | Facility: HOSPITAL | Age: 45
End: 2025-07-17

## 2025-07-17 ENCOUNTER — APPOINTMENT (OUTPATIENT)
Dept: CARDIOLOGY | Facility: HOSPITAL | Age: 45
End: 2025-07-17

## 2025-07-21 ENCOUNTER — TELEPHONE (OUTPATIENT)
Dept: CARDIAC SURGERY | Facility: HOSPITAL | Age: 45
End: 2025-07-21

## 2025-07-21 NOTE — TELEPHONE ENCOUNTER
Patient is working with his employer to get insurance with [facility].  Insurance application has been submitted and he's hoping to hear back this week.  Patient will call Dr. Denis's office when he gets a decision and we can reschedule his preop testing and surgery.  Dr. Denis notified.

## 2025-08-06 DIAGNOSIS — I25.110 CORONARY ARTERY DISEASE INVOLVING NATIVE CORONARY ARTERY OF NATIVE HEART WITH UNSTABLE ANGINA PECTORIS: ICD-10-CM

## 2025-08-06 DIAGNOSIS — E11.9 DIABETES MELLITUS TYPE II, NON INSULIN DEPENDENT (MULTI): ICD-10-CM

## 2025-08-06 RX ORDER — INSULIN LISPRO 100 [IU]/ML
12 INJECTION, SUSPENSION SUBCUTANEOUS 2 TIMES DAILY
Qty: 15 ML | Refills: 0 | Status: SHIPPED | OUTPATIENT
Start: 2025-08-06 | End: 2025-08-11 | Stop reason: SDUPTHER

## 2025-08-11 ENCOUNTER — PHARMACY VISIT (OUTPATIENT)
Dept: PHARMACY | Facility: CLINIC | Age: 45
End: 2025-08-11
Payer: COMMERCIAL

## 2025-08-11 ENCOUNTER — HOSPITAL ENCOUNTER (OUTPATIENT)
Facility: HOSPITAL | Age: 45
Setting detail: SURGERY ADMIT
End: 2025-08-11
Attending: STUDENT IN AN ORGANIZED HEALTH CARE EDUCATION/TRAINING PROGRAM | Admitting: STUDENT IN AN ORGANIZED HEALTH CARE EDUCATION/TRAINING PROGRAM

## 2025-08-11 DIAGNOSIS — E11.9 DIABETES MELLITUS TYPE II, NON INSULIN DEPENDENT (MULTI): ICD-10-CM

## 2025-08-11 DIAGNOSIS — I21.11 STEMI INVOLVING RIGHT CORONARY ARTERY (MULTI): ICD-10-CM

## 2025-08-11 DIAGNOSIS — I21.3 STEMI (ST ELEVATION MYOCARDIAL INFARCTION) (MULTI): ICD-10-CM

## 2025-08-11 DIAGNOSIS — I21.4 NSTEMI (NON-ST ELEVATED MYOCARDIAL INFARCTION) (MULTI): Primary | ICD-10-CM

## 2025-08-11 PROBLEM — I25.110 CORONARY ARTERY DISEASE INVOLVING NATIVE CORONARY ARTERY OF NATIVE HEART WITH UNSTABLE ANGINA PECTORIS: Status: ACTIVE | Noted: 2025-08-06

## 2025-08-11 PROCEDURE — RXMED WILLOW AMBULATORY MEDICATION CHARGE

## 2025-08-11 PROCEDURE — RXOTC WILLOW AMBULATORY OTC CHARGE

## 2025-08-12 RX ORDER — PRASUGREL 10 MG/1
10 TABLET, FILM COATED ORAL DAILY
Qty: 30 TABLET | Refills: 2 | Status: SHIPPED | OUTPATIENT
Start: 2025-08-12 | End: 2025-11-10

## 2025-08-12 RX ORDER — METOPROLOL SUCCINATE 50 MG/1
50 TABLET, EXTENDED RELEASE ORAL DAILY
Qty: 30 TABLET | Refills: 2 | Status: SHIPPED | OUTPATIENT
Start: 2025-08-12 | End: 2025-11-10

## 2025-08-12 RX ORDER — LOSARTAN POTASSIUM 25 MG/1
12.5 TABLET ORAL DAILY
Qty: 15 TABLET | Refills: 2 | Status: SHIPPED | OUTPATIENT
Start: 2025-08-12 | End: 2025-11-10

## 2025-08-12 RX ORDER — INSULIN LISPRO 100 [IU]/ML
12 INJECTION, SUSPENSION SUBCUTANEOUS 2 TIMES DAILY
Qty: 30 ML | Refills: 0 | Status: SHIPPED | OUTPATIENT
Start: 2025-08-12 | End: 2025-11-10

## 2025-08-12 RX ORDER — SPIRONOLACTONE 25 MG/1
25 TABLET ORAL DAILY
Qty: 30 TABLET | Refills: 2 | Status: SHIPPED | OUTPATIENT
Start: 2025-08-12 | End: 2025-11-10

## 2025-08-12 RX ORDER — ATORVASTATIN CALCIUM 80 MG/1
80 TABLET, FILM COATED ORAL NIGHTLY
Qty: 30 TABLET | Refills: 2 | Status: SHIPPED | OUTPATIENT
Start: 2025-08-12 | End: 2025-11-10

## 2025-08-13 DIAGNOSIS — I25.110 CORONARY ARTERY DISEASE INVOLVING NATIVE CORONARY ARTERY OF NATIVE HEART WITH UNSTABLE ANGINA PECTORIS: ICD-10-CM

## (undated) DEVICE — CATHETER, DIAGNOSTIC, 4 FR-JR 4

## (undated) DEVICE — CATHETER, GUIDING, LAUNCHER, 6FR, JR 4.0

## (undated) DEVICE — CATHETER, DIAGNOSTIC, 4 FR-JL 3.5

## (undated) DEVICE — TR BAND, RADIAL COMPRESSION, LONG, 29CM

## (undated) DEVICE — CANISTER, INDIGO MAX, FOR PMXENGN, NON-STERILE

## (undated) DEVICE — VALVE, HEMOSTASIS, GUARDIAN II NC, W/ GUIDEWIRE INSERTION TOOL

## (undated) DEVICE — GUIDEWIRE, HI-TORQUE, VERSACORE, 145CM, FLOPPY

## (undated) DEVICE — BAND, VASCULAR, RADIAL HEMOSTAT, REGULAR 24CM

## (undated) DEVICE — CATHETER, ANGIO, IMPULSE, PIG 155 DEG, 5 FR X 110 CM

## (undated) DEVICE — INFLATION KIT, ADVANTAGE, ENCORE 26 (1/BOX)

## (undated) DEVICE — CATHETER, ANGIO, IMPULSE, FL3.5, 5 FR X 100 CM

## (undated) DEVICE — CATHETER, DRAGONFLY, OPSTAR

## (undated) DEVICE — TR BAND, RADIAL COMPRESSION, STANDARD, 24CM

## (undated) DEVICE — INTRODUCER, GLIDESHEATH SLENDER A-KIT, 6FR 10CM

## (undated) DEVICE — GUIDEWIRE, INQWIRE, 3MM J, .035, 260

## (undated) DEVICE — CATHETER, BALLOON DILATION, EUPHORA SEMICOMPLIANT 2.50  X 12 MM X 142CM

## (undated) DEVICE — Device

## (undated) DEVICE — GLIDESHEATH, SLENDER 6FR, 10CM, NITINOL KIT

## (undated) DEVICE — CATHETER KIT, ASPIRATION, 044 CORONARY SYSTEM, 140CM

## (undated) DEVICE — CATHETER, GUIDING, HEARTRAIL III, 6 FR IKARI RIGHT 1.0

## (undated) DEVICE — GUIDEWIRE, RUN THROUGH WIRE, 180CM